# Patient Record
Sex: MALE | Race: WHITE | HISPANIC OR LATINO | Employment: OTHER | URBAN - METROPOLITAN AREA
[De-identification: names, ages, dates, MRNs, and addresses within clinical notes are randomized per-mention and may not be internally consistent; named-entity substitution may affect disease eponyms.]

---

## 2017-01-03 ENCOUNTER — HOSPITAL ENCOUNTER (INPATIENT)
Facility: HOSPITAL | Age: 67
LOS: 6 days | Discharge: HOME | DRG: 291 | End: 2017-01-09
Attending: EMERGENCY MEDICINE | Admitting: INTERNAL MEDICINE
Payer: MEDICARE

## 2017-01-03 ENCOUNTER — ANTI-COAG VISIT (OUTPATIENT)
Dept: CARDIOLOGY | Facility: CLINIC | Age: 67
End: 2017-01-03

## 2017-01-03 DIAGNOSIS — E11.65 TYPE 2 DIABETES MELLITUS WITH HYPERGLYCEMIA, WITH LONG-TERM CURRENT USE OF INSULIN: ICD-10-CM

## 2017-01-03 DIAGNOSIS — E11.22 CKD STAGE 3 DUE TO TYPE 2 DIABETES MELLITUS: ICD-10-CM

## 2017-01-03 DIAGNOSIS — N17.9 AKI (ACUTE KIDNEY INJURY): ICD-10-CM

## 2017-01-03 DIAGNOSIS — Z79.01 LONG TERM (CURRENT) USE OF ANTICOAGULANTS: ICD-10-CM

## 2017-01-03 DIAGNOSIS — D50.9 IRON DEFICIENCY ANEMIA, UNSPECIFIED IRON DEFICIENCY ANEMIA TYPE: ICD-10-CM

## 2017-01-03 DIAGNOSIS — I10 ESSENTIAL HYPERTENSION: ICD-10-CM

## 2017-01-03 DIAGNOSIS — N19 RENAL FAILURE: ICD-10-CM

## 2017-01-03 DIAGNOSIS — I50.23 ACUTE ON CHRONIC SYSTOLIC HEART FAILURE: ICD-10-CM

## 2017-01-03 DIAGNOSIS — I63.9 LEFT-SIDED CEREBROVASCULAR ACCIDENT (CVA): ICD-10-CM

## 2017-01-03 DIAGNOSIS — E87.70 HYPERVOLEMIA, UNSPECIFIED HYPERVOLEMIA TYPE: ICD-10-CM

## 2017-01-03 DIAGNOSIS — I50.9 CONGESTIVE HEART FAILURE, UNSPECIFIED CONGESTIVE HEART FAILURE CHRONICITY, UNSPECIFIED CONGESTIVE HEART FAILURE TYPE: Primary | ICD-10-CM

## 2017-01-03 DIAGNOSIS — Z79.4 TYPE 2 DIABETES MELLITUS WITH HYPERGLYCEMIA, WITH LONG-TERM CURRENT USE OF INSULIN: ICD-10-CM

## 2017-01-03 DIAGNOSIS — I82.90 THROMBUS: ICD-10-CM

## 2017-01-03 DIAGNOSIS — R07.9 CHEST PAIN: ICD-10-CM

## 2017-01-03 DIAGNOSIS — N18.30 CKD STAGE 3 DUE TO TYPE 2 DIABETES MELLITUS: ICD-10-CM

## 2017-01-03 DIAGNOSIS — E87.5 HYPERKALEMIA: ICD-10-CM

## 2017-01-03 LAB
ALBUMIN SERPL BCP-MCNC: 2.7 G/DL
ALP SERPL-CCNC: 124 U/L
ALT SERPL W/O P-5'-P-CCNC: 25 U/L
ANION GAP SERPL CALC-SCNC: 10 MMOL/L
ANION GAP SERPL CALC-SCNC: 5 MMOL/L
AST SERPL-CCNC: 16 U/L
BACTERIA #/AREA URNS HPF: NORMAL /HPF
BASOPHILS # BLD AUTO: 0.03 K/UL
BASOPHILS NFR BLD: 0.5 %
BILIRUB SERPL-MCNC: 0.4 MG/DL
BILIRUB UR QL STRIP: NEGATIVE
BNP SERPL-MCNC: 1670 PG/ML
BUN SERPL-MCNC: 77 MG/DL
BUN SERPL-MCNC: 80 MG/DL
CALCIUM SERPL-MCNC: 7.8 MG/DL
CALCIUM SERPL-MCNC: 8.6 MG/DL
CHLORIDE SERPL-SCNC: 106 MMOL/L
CHLORIDE SERPL-SCNC: 107 MMOL/L
CLARITY UR: CLEAR
CO2 SERPL-SCNC: 21 MMOL/L
CO2 SERPL-SCNC: 25 MMOL/L
COLOR UR: YELLOW
CREAT SERPL-MCNC: 4 MG/DL
CREAT SERPL-MCNC: 4.1 MG/DL
CREAT UR-MCNC: 61.5 MG/DL
DIFFERENTIAL METHOD: ABNORMAL
EOSINOPHIL # BLD AUTO: 0.2 K/UL
EOSINOPHIL NFR BLD: 2.9 %
ERYTHROCYTE [DISTWIDTH] IN BLOOD BY AUTOMATED COUNT: 16.3 %
EST. GFR  (AFRICAN AMERICAN): 17 ML/MIN/1.73 M^2
EST. GFR  (AFRICAN AMERICAN): 17 ML/MIN/1.73 M^2
EST. GFR  (NON AFRICAN AMERICAN): 14 ML/MIN/1.73 M^2
EST. GFR  (NON AFRICAN AMERICAN): 15 ML/MIN/1.73 M^2
GLUCOSE SERPL-MCNC: 127 MG/DL
GLUCOSE SERPL-MCNC: 282 MG/DL
GLUCOSE UR QL STRIP: ABNORMAL
HCT VFR BLD AUTO: 26.3 %
HGB BLD-MCNC: 8.2 G/DL
HGB UR QL STRIP: ABNORMAL
HYALINE CASTS #/AREA URNS LPF: 0 /LPF
INR PPP: 1.1
INR PPP: 1.2
INR PPP: 1.3
IRON SERPL-MCNC: 39 UG/DL
KETONES UR QL STRIP: NEGATIVE
LEUKOCYTE ESTERASE UR QL STRIP: NEGATIVE
LYMPHOCYTES # BLD AUTO: 0.5 K/UL
LYMPHOCYTES NFR BLD: 8.1 %
MCH RBC QN AUTO: 29.3 PG
MCHC RBC AUTO-ENTMCNC: 31.2 %
MCV RBC AUTO: 94 FL
MICROSCOPIC COMMENT: NORMAL
MONOCYTES # BLD AUTO: 0.4 K/UL
MONOCYTES NFR BLD: 7.3 %
NEUTROPHILS # BLD AUTO: 4.7 K/UL
NEUTROPHILS NFR BLD: 81 %
NITRITE UR QL STRIP: NEGATIVE
PH UR STRIP: 6 [PH] (ref 5–8)
PHOSPHATE SERPL-MCNC: 5.1 MG/DL
PLATELET # BLD AUTO: 273 K/UL
PMV BLD AUTO: 8.7 FL
POCT GLUCOSE: 307 MG/DL (ref 70–110)
POTASSIUM SERPL-SCNC: 5.7 MMOL/L
POTASSIUM SERPL-SCNC: 6 MMOL/L
PROT SERPL-MCNC: 6.2 G/DL
PROT UR QL STRIP: ABNORMAL
PROT UR-MCNC: 168 MG/DL
PROT UR-MCNC: 169 MG/DL
PROT/CREAT RATIO, UR: 2.75
PROTHROMBIN TIME: 12.1 SEC
PROTHROMBIN TIME: 12.4 SEC
RBC # BLD AUTO: 2.8 M/UL
RBC #/AREA URNS HPF: 0 /HPF (ref 0–4)
SATURATED IRON: 14 %
SODIUM SERPL-SCNC: 136 MMOL/L
SODIUM SERPL-SCNC: 138 MMOL/L
SODIUM UR-SCNC: 76 MMOL/L
SP GR UR STRIP: 1.02 (ref 1–1.03)
TOTAL IRON BINDING CAPACITY: 275 UG/DL
TRANSFERRIN SERPL-MCNC: 186 MG/DL
TROPONIN I SERPL DL<=0.01 NG/ML-MCNC: 0.01 NG/ML
URN SPEC COLLECT METH UR: ABNORMAL
UROBILINOGEN UR STRIP-ACNC: NEGATIVE EU/DL
WBC # BLD AUTO: 5.78 K/UL
WBC #/AREA URNS HPF: 2 /HPF (ref 0–5)

## 2017-01-03 PROCEDURE — 82962 GLUCOSE BLOOD TEST: CPT

## 2017-01-03 PROCEDURE — 25000003 PHARM REV CODE 250: Performed by: INTERNAL MEDICINE

## 2017-01-03 PROCEDURE — 36415 COLL VENOUS BLD VENIPUNCTURE: CPT

## 2017-01-03 PROCEDURE — 96365 THER/PROPH/DIAG IV INF INIT: CPT

## 2017-01-03 PROCEDURE — 85025 COMPLETE CBC W/AUTO DIFF WBC: CPT

## 2017-01-03 PROCEDURE — 93005 ELECTROCARDIOGRAM TRACING: CPT

## 2017-01-03 PROCEDURE — 94645 CONT INHLJ TX EACH ADDL HOUR: CPT

## 2017-01-03 PROCEDURE — 96375 TX/PRO/DX INJ NEW DRUG ADDON: CPT

## 2017-01-03 PROCEDURE — 63600175 PHARM REV CODE 636 W HCPCS: Performed by: INTERNAL MEDICINE

## 2017-01-03 PROCEDURE — 86335 IMMUNFIX E-PHORSIS/URINE/CSF: CPT

## 2017-01-03 PROCEDURE — 25000242 PHARM REV CODE 250 ALT 637 W/ HCPCS: Performed by: EMERGENCY MEDICINE

## 2017-01-03 PROCEDURE — 25000003 PHARM REV CODE 250: Performed by: EMERGENCY MEDICINE

## 2017-01-03 PROCEDURE — 84166 PROTEIN E-PHORESIS/URINE/CSF: CPT

## 2017-01-03 PROCEDURE — 85610 PROTHROMBIN TIME: CPT | Mod: 91

## 2017-01-03 PROCEDURE — 84300 ASSAY OF URINE SODIUM: CPT

## 2017-01-03 PROCEDURE — 94640 AIRWAY INHALATION TREATMENT: CPT

## 2017-01-03 PROCEDURE — 84484 ASSAY OF TROPONIN QUANT: CPT

## 2017-01-03 PROCEDURE — 99285 EMERGENCY DEPT VISIT HI MDM: CPT | Mod: 25

## 2017-01-03 PROCEDURE — 84100 ASSAY OF PHOSPHORUS: CPT

## 2017-01-03 PROCEDURE — 81000 URINALYSIS NONAUTO W/SCOPE: CPT

## 2017-01-03 PROCEDURE — 25000242 PHARM REV CODE 250 ALT 637 W/ HCPCS: Performed by: INTERNAL MEDICINE

## 2017-01-03 PROCEDURE — 84484 ASSAY OF TROPONIN QUANT: CPT | Mod: 91

## 2017-01-03 PROCEDURE — 84156 ASSAY OF PROTEIN URINE: CPT

## 2017-01-03 PROCEDURE — 83540 ASSAY OF IRON: CPT

## 2017-01-03 PROCEDURE — 11000001 HC ACUTE MED/SURG PRIVATE ROOM

## 2017-01-03 PROCEDURE — 83880 ASSAY OF NATRIURETIC PEPTIDE: CPT

## 2017-01-03 PROCEDURE — 80048 BASIC METABOLIC PNL TOTAL CA: CPT

## 2017-01-03 PROCEDURE — 63600175 PHARM REV CODE 636 W HCPCS: Performed by: EMERGENCY MEDICINE

## 2017-01-03 PROCEDURE — 80048 BASIC METABOLIC PNL TOTAL CA: CPT | Mod: 91

## 2017-01-03 PROCEDURE — 86335 IMMUNFIX E-PHORSIS/URINE/CSF: CPT | Mod: 26,,, | Performed by: PATHOLOGY

## 2017-01-03 PROCEDURE — 85610 PROTHROMBIN TIME: CPT

## 2017-01-03 PROCEDURE — 96367 TX/PROPH/DG ADDL SEQ IV INF: CPT

## 2017-01-03 PROCEDURE — 84156 ASSAY OF PROTEIN URINE: CPT | Mod: 91

## 2017-01-03 PROCEDURE — 80053 COMPREHEN METABOLIC PANEL: CPT

## 2017-01-03 PROCEDURE — 84166 PROTEIN E-PHORESIS/URINE/CSF: CPT | Mod: 26,,, | Performed by: PATHOLOGY

## 2017-01-03 RX ORDER — ALBUTEROL SULFATE 2.5 MG/.5ML
20 SOLUTION RESPIRATORY (INHALATION)
Status: COMPLETED | OUTPATIENT
Start: 2017-01-03 | End: 2017-01-03

## 2017-01-03 RX ORDER — CLONIDINE 0.1 MG/24H
1 PATCH, EXTENDED RELEASE TRANSDERMAL
Status: DISCONTINUED | OUTPATIENT
Start: 2017-01-04 | End: 2017-01-09

## 2017-01-03 RX ORDER — METOLAZONE 5 MG/1
10 TABLET ORAL ONCE
Status: COMPLETED | OUTPATIENT
Start: 2017-01-03 | End: 2017-01-03

## 2017-01-03 RX ORDER — AMLODIPINE BESYLATE 5 MG/1
10 TABLET ORAL NIGHTLY
Status: DISCONTINUED | OUTPATIENT
Start: 2017-01-03 | End: 2017-01-09 | Stop reason: HOSPADM

## 2017-01-03 RX ORDER — TRAZODONE HYDROCHLORIDE 50 MG/1
50 TABLET ORAL NIGHTLY
Status: DISCONTINUED | OUTPATIENT
Start: 2017-01-03 | End: 2017-01-09 | Stop reason: HOSPADM

## 2017-01-03 RX ORDER — ALBUTEROL SULFATE 2.5 MG/.5ML
5 SOLUTION RESPIRATORY (INHALATION)
Status: DISCONTINUED | OUTPATIENT
Start: 2017-01-03 | End: 2017-01-03

## 2017-01-03 RX ORDER — HYDRALAZINE HYDROCHLORIDE 25 MG/1
50 TABLET, FILM COATED ORAL 3 TIMES DAILY
Status: DISCONTINUED | OUTPATIENT
Start: 2017-01-03 | End: 2017-01-09 | Stop reason: HOSPADM

## 2017-01-03 RX ORDER — ALBUTEROL SULFATE 2.5 MG/.5ML
15 SOLUTION RESPIRATORY (INHALATION) ONCE
Status: COMPLETED | OUTPATIENT
Start: 2017-01-03 | End: 2017-01-03

## 2017-01-03 RX ORDER — BUMETANIDE 0.25 MG/ML
2 INJECTION INTRAMUSCULAR; INTRAVENOUS ONCE
Status: COMPLETED | OUTPATIENT
Start: 2017-01-03 | End: 2017-01-03

## 2017-01-03 RX ORDER — ALBUTEROL SULFATE 2.5 MG/.5ML
5 SOLUTION RESPIRATORY (INHALATION)
Status: DISCONTINUED | OUTPATIENT
Start: 2017-01-03 | End: 2017-01-04

## 2017-01-03 RX ORDER — FERROUS SULFATE 325(65) MG
325 TABLET, DELAYED RELEASE (ENTERIC COATED) ORAL DAILY
Status: DISCONTINUED | OUTPATIENT
Start: 2017-01-04 | End: 2017-01-09 | Stop reason: HOSPADM

## 2017-01-03 RX ORDER — ROSUVASTATIN CALCIUM 10 MG/1
40 TABLET, COATED ORAL DAILY
Status: DISCONTINUED | OUTPATIENT
Start: 2017-01-04 | End: 2017-01-09 | Stop reason: HOSPADM

## 2017-01-03 RX ORDER — DEXTROSE 50 % IN WATER (D50W) INTRAVENOUS SYRINGE
25
Status: COMPLETED | OUTPATIENT
Start: 2017-01-03 | End: 2017-01-03

## 2017-01-03 RX ORDER — CITALOPRAM 20 MG/1
20 TABLET, FILM COATED ORAL DAILY
Status: DISCONTINUED | OUTPATIENT
Start: 2017-01-04 | End: 2017-01-09 | Stop reason: HOSPADM

## 2017-01-03 RX ORDER — FUROSEMIDE 10 MG/ML
40 INJECTION INTRAMUSCULAR; INTRAVENOUS
Status: COMPLETED | OUTPATIENT
Start: 2017-01-03 | End: 2017-01-03

## 2017-01-03 RX ORDER — WARFARIN 4 MG/1
4 TABLET ORAL DAILY
Status: CANCELLED | OUTPATIENT
Start: 2017-01-04

## 2017-01-03 RX ORDER — SODIUM CHLORIDE 0.9 % (FLUSH) 0.9 %
3 SYRINGE (ML) INJECTION EVERY 8 HOURS
Status: DISCONTINUED | OUTPATIENT
Start: 2017-01-03 | End: 2017-01-09 | Stop reason: HOSPADM

## 2017-01-03 RX ADMIN — SODIUM POLYSTYRENE SULFONATE 15 G: 15 SUSPENSION ORAL; RECTAL at 06:01

## 2017-01-03 RX ADMIN — TRAZODONE HYDROCHLORIDE 50 MG: 50 TABLET ORAL at 11:01

## 2017-01-03 RX ADMIN — BUMETANIDE 1 MG/HR: 0.25 INJECTION INTRAMUSCULAR; INTRAVENOUS at 08:01

## 2017-01-03 RX ADMIN — ALBUTEROL SULFATE 2.5 MG: 2.5 SOLUTION RESPIRATORY (INHALATION) at 07:01

## 2017-01-03 RX ADMIN — AMLODIPINE BESYLATE 10 MG: 5 TABLET ORAL at 11:01

## 2017-01-03 RX ADMIN — FUROSEMIDE 40 MG: 10 INJECTION, SOLUTION INTRAMUSCULAR; INTRAVENOUS at 04:01

## 2017-01-03 RX ADMIN — INSULIN DETEMIR 12 UNITS: 100 INJECTION, SOLUTION SUBCUTANEOUS at 11:01

## 2017-01-03 RX ADMIN — ALBUTEROL SULFATE 20 MG: 2.5 SOLUTION RESPIRATORY (INHALATION) at 06:01

## 2017-01-03 RX ADMIN — INSULIN HUMAN 10 UNITS: 100 INJECTION, SOLUTION PARENTERAL at 06:01

## 2017-01-03 RX ADMIN — DEXTROSE MONOHYDRATE 25 G: 500 INJECTION PARENTERAL at 06:01

## 2017-01-03 RX ADMIN — CALCIUM GLUCONATE 1000 MG: 94 INJECTION, SOLUTION INTRAVENOUS at 11:01

## 2017-01-03 RX ADMIN — SODIUM CHLORIDE, PRESERVATIVE FREE 3 ML: 5 INJECTION INTRAVENOUS at 11:01

## 2017-01-03 RX ADMIN — METOLAZONE 10 MG: 5 TABLET ORAL at 08:01

## 2017-01-03 RX ADMIN — BUMETANIDE 2 MG: 0.25 INJECTION INTRAMUSCULAR; INTRAVENOUS at 08:01

## 2017-01-03 RX ADMIN — HYDRALAZINE HYDROCHLORIDE 50 MG: 25 TABLET ORAL at 11:01

## 2017-01-03 RX ADMIN — CALCIUM GLUCONATE 1 G: 94 INJECTION, SOLUTION INTRAVENOUS at 06:01

## 2017-01-03 NOTE — IP AVS SNAPSHOT
[facility]  [address]  [place] [address]  Phone: [phone]           I have received a copy of my After Visit Summary and discharge instructions from [facility].    INSTRUCTIONS RECEIVED AND UNDERSTOOD BY:                     Patient/Patient Representative: ________________________________________________________________     Date/Time: ________________________________________________________________                     Instructions Given By: ________________________________________________________________     Date/Time: ________________________________________________________________

## 2017-01-03 NOTE — LETTER
January 7, 2017         [address] [place] [address] [place] [address]  Phone: [phone]  Fax: [phone]       Patient: Ambrocio Chin  YOB: 1950  Date of Visit: 01/06/2017    To Whom It May Concern:    Margie Chin [birth year] was at [facility] on 01/06/2017. He may return to work/school on 01/10/2017 with no restrictions. If you have any questions or concerns, or if I can be of further assistance, please do not hesitate to contact me.    Sincerely,        Justyn Albarran MD

## 2017-01-03 NOTE — IP AVS SNAPSHOT
[facility]  [address]  [place] [address]  Phone: [phone]           Patient Discharge Instructions     Our goal is to set you up for success. This packet includes information on your condition, medications, and your home care. It will help you to care for yourself so you don't get sicker and need to go back to the hospital.     Please ask your nurse if you have any questions.        There are many details to remember when preparing to leave the hospital. Here is what you will need to do:    1. Take your medicine. If you are prescribed medications, review your Medication List in the following pages. You may have new medications to  at the pharmacy and others that you'll need to stop taking. Review the instructions for how and when to take your medications. Talk with your doctor or nurses if you are unsure of what to do.     2. Go to your follow-up appointments. Specific follow-up information is listed in the following pages. Your may be contacted by a transition nurse or clinical provider about future appointments. Be sure we have all of the phone numbers to reach you, if needed. Please contact your provider's office if you are unable to make an appointment.     3. Watch for warning signs. Your doctor or nurse will give you detailed warning signs to watch for and when to call for assistance. These instructions may also include educational information about your condition. If you experience any of warning signs to your health, call your doctor.               ** Verify the list of medication(s) below is accurate and up to date. Carry this with you in case of emergency. If your medications have changed, please notify your healthcare provider.             Medication List      START taking these medications        Additional Info                      aspirin 81 MG EC tablet   Commonly known as:  ECOTRIN   Refills:  0   Dose:  81 mg    Last time this was given:  81 mg on 1/9/2017  9:05  AM   Instructions:  Take 1 tablet (81 mg total) by mouth once daily.     Begin Date    AM    Noon    PM    Bedtime       ergocalciferol 50,000 unit Cap   Commonly known as:  ERGOCALCIFEROL   Quantity:  8 capsule   Refills:  0   Dose:  72463 Units    Last time this was given:  50,000 Units on 1/8/2017  9:16 AM   Instructions:  Take 1 capsule (50,000 Units total) by mouth every 7 days.     Begin Date    AM    Noon    PM    Bedtime       lancets Misc   Commonly known as:  ACCU-CHEK SOFTCLIX LANCETS   Quantity:  120 each   Refills:  2   Dose:  1 lancet    Instructions:  1 lancet by Misc.(Non-Drug; Combo Route) route 2 hours after meals and at bedtime.     Begin Date    AM    Noon    PM    Bedtime       metOLazone 10 MG tablet   Commonly known as:  ZAROXOLYN   Quantity:  30 tablet   Refills:  11   Dose:  10 mg    Last time this was given:  10 mg on 1/9/2017  9:05 AM   Instructions:  Take 1 tablet (10 mg total) by mouth once daily.     Begin Date    AM    Noon    PM    Bedtime       nut.tx.impaired renal fxn,soy 0.04-1.79 gram-kcal/mL Liqd   Quantity:  30 Can   Refills:  3    Instructions:  1 Can with Meals Three Times a Day     Begin Date    AM    Noon    PM    Bedtime       vitamin renal formula (B-complex-vitamin c-folic acid) 1 mg Cap   Commonly known as:  NEPHROCAP   Quantity:  90 capsule   Refills:  3   Dose:  1 capsule    Last time this was given:  1 capsule on 1/9/2017  9:05 AM   Instructions:  Take 1 capsule by mouth once daily.     Begin Date    AM    Noon    PM    Bedtime         CHANGE how you take these medications        Additional Info                      carvedilol 12.5 MG tablet   Commonly known as:  COREG   Quantity:  180 tablet   Refills:  3   Dose:  12.5 mg   What changed:  when to take this    Last time this was given:  6.25 mg on 1/9/2017  9:05 AM   Instructions:  Take 1 tablet (12.5 mg total) by mouth 2 (two) times daily.     Begin Date    AM    Noon    PM    Bedtime       furosemide 80 MG tablet    Commonly known as:  LASIX   Quantity:  90 tablet   Refills:  11   Dose:  80 mg   What changed:    - medication strength  - how much to take  - when to take this  - Another medication with the same name was removed. Continue taking this medication, and follow the directions you see here.    Last time this was given:  80 mg on 1/9/2017  5:50 AM   Instructions:  Take 1 tablet (80 mg total) by mouth every 8 (eight) hours.     Begin Date    AM    Noon    PM    Bedtime         CONTINUE taking these medications        Additional Info                      amlodipine 10 MG tablet   Commonly known as:  NORVASC   Quantity:  90 tablet   Refills:  3   Dose:  10 mg    Last time this was given:  10 mg on 1/8/2017 10:18 PM   Instructions:  Take 1 tablet (10 mg total) by mouth every evening.     Begin Date    AM    Noon    PM    Bedtime       citalopram 20 MG tablet   Commonly known as:  CELEXA   Quantity:  30 tablet   Refills:  3   Dose:  20 mg    Last time this was given:  20 mg on 1/9/2017  9:05 AM   Instructions:  Take 1 tablet (20 mg total) by mouth once daily.     Begin Date    AM    Noon    PM    Bedtime       ferrous sulfate 325 (65 FE) MG EC tablet   Refills:  0   Dose:  325 mg    Last time this was given:  325 mg on 1/9/2017  9:05 AM   Instructions:  Take 1 tablet (325 mg total) by mouth once daily.     Begin Date    AM    Noon    PM    Bedtime       hydrALAZINE 50 MG tablet   Commonly known as:  APRESOLINE   Quantity:  90 tablet   Refills:  3   Dose:  50 mg    Last time this was given:  50 mg on 1/8/2017 10:18 PM   Instructions:  Take 1 tablet (50 mg total) by mouth 3 (three) times daily.     Begin Date    AM    Noon    PM    Bedtime       insulin glargine 100 unit/mL injection   Commonly known as:  LANTUS   Quantity:  10.8 mL   Refills:  0   Dose:  12 Units    Instructions:  Inject 12 Units into the skin every evening.     Begin Date    AM    Noon    PM    Bedtime       lisinopril 40 MG tablet   Commonly known as:   PRINIVIL,ZESTRIL   Quantity:  90 tablet   Refills:  0   Dose:  40 mg    Last time this was given:  10 mg on 1/9/2017  9:31 AM   Instructions:  Take 1 tablet (40 mg total) by mouth once daily.     Begin Date    AM    Noon    PM    Bedtime       rosuvastatin 20 MG tablet   Commonly known as:  CRESTOR   Quantity:  30 tablet   Refills:  3   Dose:  40 mg    Last time this was given:  40 mg on 1/9/2017 10:40 AM   Instructions:  Take 2 tablets (40 mg total) by mouth once daily.     Begin Date    AM    Noon    PM    Bedtime       trazodone 50 MG tablet   Commonly known as:  DESYREL   Quantity:  30 tablet   Refills:  3   Dose:  50 mg    Last time this was given:  50 mg on 1/8/2017 10:18 PM   Instructions:  Take 1 tablet (50 mg total) by mouth every evening.     Begin Date    AM    Noon    PM    Bedtime         STOP taking these medications     cloNIDine 0.1 mg/24 hr td ptwk 0.1 mg/24 hr   Commonly known as:  CATAPRES       warfarin 4 MG tablet   Commonly known as:  COUMADIN            Where to Get Your Medications      You can get these medications from any pharmacy     Bring a paper prescription for each of these medications     amlodipine 10 MG tablet    carvedilol 12.5 MG tablet    ergocalciferol 50,000 unit Cap    furosemide 80 MG tablet    hydrALAZINE 50 MG tablet    insulin glargine 100 unit/mL injection    lancets Misc    lisinopril 40 MG tablet    metOLazone 10 MG tablet    nut.tx.impaired renal fxn,soy 0.04-1.79 gram-kcal/mL Liqd    vitamin renal formula (B-complex-vitamin c-folic acid) 1 mg Cap       You don't need a prescription for these medications     aspirin 81 MG EC tablet                  Please bring to all follow up appointments:    1. A copy of your discharge instructions.  2. All medicines you are currently taking in their original bottles.  3. Identification and insurance card.    Please arrive 15 minutes ahead of scheduled appointment time.    Please call 24 hours in advance if you must reschedule your  appointment and/or time.        Your Scheduled Appointments     Jan 17, 2017 11:20 AM CST   New Patient with MD [place] Allen - Neurology ([place])    [address]  [place] [id number] [phone]            Jan 23, 2017  8:00 AM CST   [facility]   Leonid Perdomo - Arrhythmia ([address] )    [address]  [facility] [address]   [phone]            Jan 27, 2017  8:20 AM CST   Established Patient Visit with MD [place] Grover - Cardiology ([place])    [address], [address]  [place] [id number] [phone]              Follow-up Information     Follow up with KRISTOPHER On 1/13/2017.    Why:  time: 10: 30    Contact information:    [address] [place] [address]  [phone]          Follow up with Luis Alberto Schmitz MD. Go on 1/12/2017.    Specialty:  Nephrology    Why:  Time: 11am    Contact information:    [address]  [address]  [place] [address]  [phone]          Schedule an appointment as soon as possible for a visit with Giovani Elkins MD.    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    Specialties:  INTERVENTIONAL CARDIOLOGY, Cardiology    Why:  at 8:20am    Contact information:    [address]  [address]  [place] [address]  [phone]          Follow up with KRISTOPHER.    Why:  Tuesday at 1100 Jan 17    Contact information:    [address]  [place] [address]  [phone]          Follow up with Luis Alberto Schmitz MD.    Specialty:  Nephrology    Why:  Jan 12th at 11     Contact information:    [address]  [address]  [place] [address]  [phone]          Follow up with Giovani Elkins MD.    Specialty:  Vascular Surgery    Why:  JAN 31ST AT 10AM    Contact information:    [address]  [address]  [facility] [address]  [phone]        Referrals     Future Orders    Ambulatory Referral to Cardiology         Discharge Instructions     Future Orders    CBC W/ AUTO DIFFERENTIAL     Process Instructions:    Please collect a Lavender, EDTA tube or EDTA Microtainer.  If the patient is a known platelet clumper, please collect an additional citrate (blue top) tube.    COMPREHENSIVE METABOLIC PANEL     MAGNESIUM     PHOSPHORUS     Activity as tolerated     BATH/SHOWER CHAIR FOR HOME USE     Questions:    Height:  5' 3" (1.6 m)    Weight:  86.5 kg (190 lb 11.2 oz)    Does patient have medical equipment at home?:  walker, rolling    bedside commode    Length of need (1-99 months):  99    Type:  Without back    Call MD for:  difficulty breathing or increased cough     Call MD for:  increased confusion or weakness     Call MD for:  persistent dizziness, light-headedness, or visual disturbances     Call MD for:  persistent nausea and vomiting or diarrhea     Call MD for:  redness, tenderness, or signs of infection (pain, swelling, redness, odor or green/yellow discharge around incision site)     Call MD for:  severe persistent headache     Call MD for:  severe uncontrolled pain     Call MD for:  temperature >100.4     Call MD for:  worsening rash     Diet renal     TRANSFER TUB BENCH FOR HOME USE     Questions:    Type of Transfer Tub Bench:  Unpadded    Height:  5' 3" (1.6 m)    Weight:  92.5 kg (203 lb 14.8 oz)    Does patient have medical equipment at home?:  walker, rolling    bedside commode    Length of need (1-99 months):  99      Discharge References/Attachments     HEART MEDICATIONS, TAKING (ENGLISH)    HEART FAILURE: TRACKING YOUR WEIGHT (ENGLISH)    HEART FAILURE: WARNING SIGNS OF A FLARE-UP (ENGLISH)    HEART FAILURE, COPING WITH (ENGLISH)    HEART FAILURE, DISCHARGE INSTRUCTIONS FOR (ENGLISH)    HEART FAILURE, DISCHARGE INSTRUCTIONS FOR (Bulgarian)    HEART FAILURE, WHAT IS (Bulgarian)    " "HEART FAILURE: WARNING SIGNS OF A FLARE-UP (Jamaican)    HEART FAILURE: TRACKING YOUR WEIGHT (Jamaican)    HEART FAILURE: PROCEDURES THAT MAY HELP (Jamaican)    HEART FAILURE: MAKING CHANGES TO YOUR DIET (Jamaican)    HEMODIALYSIS (Jamaican)    KIDNEY DISEASE, DISCHARGE INSTRUCTIONS FOR CHRONIC  (Jamaican)    ASPIRIN ORAL TABLET (ENGLISH)    ERGOCALCIFEROL ORAL CAPSULE (ENGLISH)    METOLAZONE (ENGLISH)    NEPHROCAPS (ENGLISH)    ASPIRINA, TABLETA ORAL (Jamaican)    ERGOCALCIFEROL, CÁPSULA ORAL  (Jamaican)    METOLAZONE (Jamaican)    NEPHROCAPS (Jamaican)    CHRONIC KIDNEY DISEASE, DIET FOR (Jamaican)    DIABETES, DIET (Jamaican)        Primary Diagnosis     Your primary diagnosis was:  Heart Failure      Admission Information     Date & Time Provider Department CSN    1/3/2017  4:01 PM Jayy Gaytan MD [facility] [id number]      Care Providers     Provider Role Specialty Primary office phone    Jayy Gaytan MD Attending Provider Internal Medicine [phone]      Important Medicare Message          Most Recent Value    Important Message from Medicare Regarding Discharge Appeal Rights  Given to patient/caregiver, Explained to patient/caregiver, Signed/date by patient/caregiver yes 01/06/2017 1132      Your Vitals Were     BP Pulse Temp Resp Height Weight    121/61 (BP Location: Left arm, Patient Position: Lying, BP Method: Automatic) 68 98 °F (36.7 °C) (Oral) 20 5' 3" (1.6 m) 86.5 kg (190 lb 11.2 oz)    SpO2 BMI             93% 33.78 kg/m2         Recent Lab Values        11/15/2016                           1:37 PM           A1C 10.5 (H)           Comment for A1C at  1:37 PM on 11/15/2016:  According to ADA guidelines, hemoglobin A1C <7.0% represents  optimal control in non-pregnant diabetic patients.  Different  metrics may apply to specific populations.   Standards of Medical Care in Diabetes - 2016.  For the purpose of screening for the presence of diabetes:  <5.7%     Consistent with the absence of " diabetes  5.7-6.4%  Consistent with increasing risk for diabetes   (prediabetes)  >or=6.5%  Consistent with diabetes  Currently no consensus exists for use of hemoglobin A1C  for diagnosis of diabetes for children.        Pending Labs     Order Current Status    Immunofixation, 24 hour Urine In process      Allergies as of 1/9/2017     No Known Allergies      [facility]     [facility] Nurse Care Line - 24/7 Assistance  Unless otherwise directed by your provider, please contact [facility], our nurse care line that is available for 24/7 assistance.     Registered nurses in the [facility] provide clinical advisement, health education, appointment booking, and other advisory services.  Call for this free service at [phone].        Advance Directives     An advance directive is a document which, in the event you are no longer able to make decisions for yourself, tells your healthcare team what kind of treatment you do or do not want to receive, or who you would like to make those decisions for you.  If you do not currently have an advance directive, [facility] encourages you to create one.  For more information call:  [phone]), [phone] ([phone]),  or log on to [email or web address].        Language Assistance Services     ATTENTION: Language assistance services are available, free of charge. Please call [phone].      ATENCIÓN: Si habla español, tiene a paulson disposición servicios gratuitos de asistencia lingüística. Llame al [phone].     CHÚ Ý: N?u b?n nói Ti?ng Vi?t, có các d?ch v? h? tr? ngôn ng? mi?n phí dành cho b?n. G?i s? [phone].        Stroke Education              Heart Failure Education       Heart Failure: Being Active  You have a condition called heart failure. Being active doesnt mean that you have to wear yourself out. Even a little movement each day helps to strengthen your heart. If you cant get out to exercise, you can do  simple stretching and strengthening exercises at home. These are good ways to keep you well-conditioned and prevent you and your heart from becoming excessively weak.    Ideas to get you started  · Add a little movement to things you do now. Walk to mail letters. Park your car at the far end of the parking lot and walk to the store. Walk up a flight of stairs instead of taking the elevator.  · Choose activities you enjoy. You might walk, swim, or ride an exercise bike. Things like gardening and washing the car count, too. Other possibilities include: washing dishes, walking the dog, walking around the mall, and doing aerobic activities with friends.  · Join a group exercise program at a [facility] or [facility], a senior center, or a community center. Or look into a hospital cardiac rehabilitation program. Ask your doctor if you qualify.  Tips to keep you going  · Get up and get dressed each day. Go to a coffee shop and read a newspaper or go somewhere that you'll be in the presence of other active people. Youll feel more like being active.  · Make a plan. Choose one or more activities that you enjoy and that you can easily do. Then plan to do at least one each day. You might write your plan on a calendar.  · Go with a friend or a group if you like company. This can help you feel supported and stay motivated, too.  · Plan social events that you enjoy. This will keep you mentally engaged as well as physically motivated to do things you find pleasure in.  For your safety  · Talk with your healthcare provider before starting an exercise program.  · Exercise indoors when its too hot or too cold outside, or when the air quality is poor. Try walking at a shopping mall.  · Wear socks and sturdy shoes to maintain your balance and prevent falls.  · Start slowly. Do a few minutes several times a day at first. Increase your time and speed little by little.  · Stop and rest whenever you feel tired or get short of breath.  · Dont push  yourself on days when you dont feel well.  © 4132-0171 [employer]. [address] [place] [address], [place] [address]. All rights reserved. This information is not intended as a substitute for professional medical care. Always follow your healthcare professional's instructions.              Heart Failure: Evaluating Your Heart  You have a condition called heart failure. To evaluate your condition, your doctor will examine you, ask questions, and do some tests. Along with looking for signs of heart failure, the doctor looks for any other health problems that may have led to heart failure. The results of your evaluation will help your doctor form a treatment plan.  Health history and physical exam  Your visit will start with a health history. Tell the doctor about any symptoms youve noticed and about all medicines you take. Then youll have a physical exam. This includes listening to your heartbeat and breathing. Youll also be checked for swelling (edema) in your legs and neck. When you have fluid buildup or fluid in the lungs, it may be called congestive heart failure.  Diagnosing heart failure     During an echocardiogram, sound waves bounce off the heart. These are converted into a picture on the screen.   The following may be done to help your doctor form a diagnosis:  · X-rays show the size and shape of your heart. These pictures can also show fluid in your lungs.  · An electrocardiogram (ECG or EKG) shows the pattern of your heartbeat. Small pads (electrodes) are placed on your chest, arms, and legs. Wires connect the pads to the ECG machine, which records your hearts electrical signals. This can give the doctor information about heart function.  · An echocardiogram uses ultrasound waves to show the structure and movement of your heart muscle. This shows how well the heart pumps. It also shows the thickness of the heart walls, and if the heart is enlarged. It is one of the most useful, non-invasive  tests as it provides information about the heart's general function. This helps your doctor make treatment decisions.  · Lab tests evaluate small amounts of blood or urine for signs of problems. A BNP lab test can help diagnose and evaluate heart failure. BNP stands for B-type natriuretic peptide. The ventricles secrete more BNP when heart failure worsens. Lab tests can also provide information about metabolic dysfunction or heart dysfunction.  Your treatment plan  Based on the results of your evaluation and tests, your doctor will develop a treatment plan. This plan is designed to relieve some of your heart failure symptoms and help make you more comfortable. Your treatment plan may include:  · Medicine to help your heart work better and improve your quality of life  · Changes in what you eat and drink to help prevent fluid from backing up in your body  · Daily monitoring of your weight and heart failure symptoms to see how well your treatment plan is working  · Exercise to help you stay healthy  · Help with quitting smoking  · Emotional and psychological support to help adjust to the changes  · Referrals to other specialists to make sure you are being treated comprehensively  © 7551-3375 [employer]. [address] [place] [address]. All rights reserved. This information is not intended as a substitute for professional medical care. Always follow your healthcare professional's instructions.              Heart Failure: Making Changes to Your Diet  You have a condition called heart failure. When you have heart failure, excess fluid is more likely to build up in your body because your heart isn't working well. This makes the heart work harder to pump blood. Fluid buildup causes symptoms such as shortness of breath and swelling (edema). This is often referred to as congestive heart failure or CHF. Controlling the amount of salt (sodium) you eat may help stop fluid from building up. Your doctor  may also tell you to reduce the amount of fluid you drink.  Reading food labels    Your healthcare provider will tell you how much sodium you can eat each day. Read food labels to keep track. Keep in mind that certain foods are high in salt. These include canned, frozen, and processed foods. Check the amount of sodium in each serving. Watch out for high-sodium ingredients. These include MSG (monosodium glutamate), baking soda, and sodium phosphate.   Eating less salt  Give yourself time to get used to eating less salt. It may take a little while. Here are some tips to help:  · Take the saltshaker off the table. Replace it with salt-free herb mixes and spices.  · Eat fresh or plain frozen vegetables. These have much less salt than canned vegetables.  · Choose low-sodium snacks like sodium-free pretzels, crackers, or air-popped popcorn.  · Dont add salt to your food when youre cooking. Instead, season your foods with pepper, lemon, garlic, or onion.  · When you eat out, ask that your food be cooked without added salt.  · Avoid eating fried foods as these often have a great deal of salt.  If youre told to limit fluids  You may need to limit how much fluid you have to help prevent swelling. This includes anything that is liquid at room temperature, such as ice cream and soup. If your doctor tells you to limit fluid, try these tips:  · Measure drinks in a measuring cup before you drink them. This will help you meet daily goals.  · Chill drinks to make them more refreshing.  · Suck on frozen lemon wedges to quench thirst.  · Only drink when youre thirsty.  · Chew sugarless gum or suck on hard candy to keep your mouth moist.  · Weigh yourself daily to know if your body's fluid content is rising.  My sodium goal  Your healthcare provider may give you a sodium goal to meet each day. This includes sodium found in food as well as salt that you add. My goal is to eat no more than ___________ mg of sodium per day.     When  to call your doctor  Call your doctor right away if you have any symptoms of worsening heart failure. These can include:  · Sudden weight gain  · Increased swelling of your legs or ankles  · Trouble breathing when youre resting or at night  · Increase in the number of pillows you have to sleep on  · Chest pain, pressure, discomfort, or pain in the jaw, neck, or back   © 6428-5851 [employer]. [address] [place] [address], [place] [address]. All rights reserved. This information is not intended as a substitute for professional medical care. Always follow your healthcare professional's instructions.              Heart Failure: Medicines to Help Your Heart    You have a condition called heart failure (also known as congestive heart failure, or CHF). Your doctor will likely prescribe medicines for heart failure and any underlying health problems you have. Most heart failure patients take one or more types of medicinen. Your healthcare provider will work to find the combination of medicines that works best for you.  Heart failure medicines  Here are the most common heart failure medicines:  · ACE inhibitors lower blood pressure and decrease strain on the heart. This makes it easier for the heart to pump. Angiotensin receptor blockers have similar effects. These are prescribed for some patients instead of ACE inhibitors.  · Beta-blockers relieve stress on the heart. They also improve symptoms. They may also improve the heart's pumping action over time.  · Diuretics (also called water pills) help rid your body of excess water. This can help rid your body of swelling (edema). Having less fluid to pump means your heart doesnt have to work as hard. Some diuretics make your body lose a mineral called potassium. Your doctor will tell you if you need to take supplements or eat more foods high in potassium.  · Digoxin helps your heart pump with more strength. This helps your heart pump more blood with each beat. So,  more oxygen-rich blood travels to the rest of the body.  · Aldosterone antagonists help alter hormones and decrease strain on the heart.  · Hydralazine and nitrates are two separate medicines used together to treat heart failure. They may come in one combination pill. They lower blood pressure and decrease how hard the heart has to pump.  Medicines for related conditions  Controlling other heart problems helps keep heart failure under control, too. Depending on other heart problems you have, medicines may be prescribed to:  · Lower blood pressure (antihypertensives).  · Lower cholesterol levels (statins).  · Prevent blood clots (anticoagulants or aspirin).  · Keep the heartbeat steady (antiarrhythmics).  © 0747-9370 [employer]. [address] [place] [address], [place] [address]. All rights reserved. This information is not intended as a substitute for professional medical care. Always follow your healthcare professional's instructions.              Heart Failure: Procedures That May Help    The heart is a muscle that pumps oxygen-rich blood to all parts of the body. When you have heart failure, the heart is not able to pump as well as it should. Blood and fluid may back up into the lungs (congestive heart failure), and some parts of the body dont get enough oxygen-rich blood to work normally. These problems lead to the symptoms of heart failure.     Certain procedures may help the heart pump better in some cases of heart failure. Some procedures are done to treat health problems that may have caused the heart failure such as coronary artery disease or heart rhythm problems. For more serious heart failure, other options are available.  Treating artery and valve problems  If you have coronary artery disease or valve disease, procedures may be done to improve blood flow. This helps the heart pump better, which can improve heart failure symptoms. First, your doctor may do a cardiac catheterization to help  detect clogged blood vessels or valve damage. During this procedure, a  thin tube (catheter) in inserted into a blood vessel and guided to the heart. There a dye is injected and a special type of X-ray (angiogram) is taken of the blood vessels. Procedures to open a blocked artery or fix damaged valves can also be done using catheterization.  · Angioplasty uses a balloon-tipped instrument at the end of the catheter. The balloon is inflated to widen the narrowed artery. In many cases, a stent is expanded to further support the narrowed artery. A stent is a metal mesh tube.  · Valve surgery repairs or replacement of faulty valves can also be done during catheterization so blood can flow properly through the chambers of the heart.  Bypass surgery is another option to help treat blocked arteries. It uses a healthy blood vessel from elsewhere in the body. The healthy blood vessel is attached above and below the blocked area so that blood can flow around the blocked artery.  Treating heart rhythm problems  A device may be placed in the chest to help a weak heart maintain a healthy, heartbeat so the heart can pump more effectively:  · Pacemaker. A pacemaker is an implanted device that regulates your heartbeat electronically. It monitors your heart's rhythm and generates a painless electric impulse that helps the heart beat in a regular rhythm. A pacemaker is programmed to meet your specific heart rhythm needs.  · Biventricular pacing/cardiac resynchronization therapy. A type of pacemaker that paces both pumping chambers of the heart at the same time to coordinate contractions and to improve the heart's function. Some people with heart failure are candidates for this therapy.  · Implantable cardioverter defibrillator. A device similar to a pacemaker that senses when the heart is beating too fast and delivers an electrical shock to convert the fast rhythm to a normal rhythm. This can be a life saving device.  In severe  cases  In more serious cases of heart failure when other treatments no longer work, other options may include:  · Ventricular assist devices (VADs). These are mechanical devices used to take over the pumping function for one or both of the heart's ventricles, or pumping chambers. A VAD may be necessary when heart failure progresses to the point that medicines and other treatments no longer help. In some cases, a VAD may be used as a bridge to transplant.  · Heart transplant. This is replacing the diseased heart with a healthy one from a donor. This is an option for a few people who are very sick. A heart transplant is very serious and not an option for all patients. Your doctor can tell you more.  © 9480-0465 [employer]. [address] [place] [address], [place] [address]. All rights reserved. This information is not intended as a substitute for professional medical care. Always follow your healthcare professional's instructions.              Heart Failure: Tracking Your Weight  You have a condition called heart failure. When you have heart failure, a sudden weight gain or a steady rise in weight is a warning sign that your body is retaining too much water and salt. This could mean your heart failure is getting worse. If left untreated, it can cause problems for your lungs and result in shortness of breath. Weighing yourself each day is the best way to know if youre retaining water. If your weight goes up quickly, call your doctor. You will be given instructions on how to get rid of the excess water. You will likely need medicines and to avoid salt. This will help your heart work better.  Call your doctor if you gain more than 2 pounds in 1 day, more than 5 pounds in 1 week, or whatever weight gain you were told to report by your doctor. This is often a sign of worsening heart failure and needs to be evaluated and treated. Your doctor will tell you what to do next.   Tips for weighing yourself    · Weigh  yourself at the same time each morning, wearing the same clothes. Weigh yourself after urinating and before eating.  · Use the same scale each day. Make sure the numbers are easy to read. Put the scale on a flat, hard surface -- not on a rug or carpet.  · Do not stop weighing yourself. If you forget one day, weigh again the next morning.  How to use your weight chart  · Keep your weight chart near the scale. Write your weight on the chart as soon as you get off the scale.  · Fill in the month and the start date on the chart. Then write down your weight each day. Your chart will look like this:    · If you miss a day, leave the space blank. Weigh yourself the next day and write your weight in the next space.  · Take your weight chart with you when you go to see your doctor.  © 6525-4712 [employer]. [address] [place] [address]. All rights reserved. This information is not intended as a substitute for professional medical care. Always follow your healthcare professional's instructions.              Heart Failure: Warning Signs of a Flare-Up  You have a condition called heart failure. Once you have heart failure, flare-ups can happen. Below are signs that can mean your heart failure is getting worse. If you notice any of these warning signs, call your healthcare provider.  Swelling    · Your feet, ankles, or lower legs get puffier.  · You notice skin changes on your lower legs.  · Your shoes feel too tight.  · Your clothes are tighter in the waist.  · You have trouble getting rings on or off your fingers.  Shortness of breath  · You have to breathe harder even when youre doing your normal activities or when youre resting.  · You are short of breath walking up stairs or even short distances.  · You wake up at night short of breath or coughing.  · You need to use more pillows or sit up to sleep.  · You wake up tired or restless.  Other warning signs  · You feel weaker, dizzy, or more  tired.  · You have chest pain or changes in your heartbeat.  · You have a cough that wont go away.  · You cant remember things or dont feel like eating.  Tracking your weight  Gaining weight is often the first warning sign that heart failure is getting worse. Gaining even a few pounds can be a sign that your body is retaining excess water and salt. Weighing yourself each day in the morning after you urinate and before you eat, is the best way to know if you're retaining water. Get a scale that is easy to read and make sure you wear the same clothes and use the same scale every time you weigh. Your healthcare provider will show you how to track your weight. Call your doctor if you gain more than 2 pounds in 1 day, 5 pounds in 1 week, or whatever weight gain you were told to report by your doctor. This is often a sign of worsening heart failure and needs to be evaluated and treated before it compromises your breathing. Your doctor will tell you what to do next.    © 1623-5122 [employer]. [address] [place] [address]. All rights reserved. This information is not intended as a substitute for professional medical care. Always follow your healthcare professional's instructions.              Chronic Kindey Disease Education             Diabetes Discharge Instructions                                   [facility] Sign-Up     Activating your [facility] account is as easy as 1-2-3!     1) Visit [email or web address], select Sign Up Now, enter this activation code and your date of birth, then select Next.  [email or web address]  Expires: 1/13/2017 10:06 AM      2) Create a username and password to use when you visit [facility] in the future and select a security question in case you lose your password and select Next.    3) Enter your e-mail address and click Sign Up!    Additional Information  If you have questions, please e-mail [email or web address] or call [phone] to talk to our [facility] staff.  Remember, [facility] is NOT to be used for urgent needs. For medical emergencies, dial 911.          [facility] complies with applicable Federal civil rights laws and does not discriminate on the basis of race, color, national origin, age, disability, or sex.

## 2017-01-03 NOTE — ED NOTES
Pt lying in bed, AAO x's 3. Pt stated that he came to the ER with c/o SOB second to edema. Pt reports a cardiac history.   APPEARANCE: Alert, oriented and in no acute distress.  CARDIAC: Normal rate and rhythm, no murmur heard.   PERIPHERAL VASCULAR: peripheral pulses present. Normal cap refill. +2 pitting edema to bilat upper and lower extremities. Warm to touch.    RESPIRATORY:Normal rate and effort, breath sound course with crackles in all lung fields. Respirations are equal but labored, mild amount of distress.  GASTRO: soft, bowel sounds normal, no tenderness, no abdominal distention, pt obese.  MUSC: Full ROM. No bony tenderness or soft tissue tenderness. No obvious deformity.  SKIN: Skin is warm and dry, decreased skin turgor mucous membranes moist.  NEURO: 5/5 strength major flexors/extensors bilaterally. Sensory intact to light touch bilaterally. Blythe coma scale: eyes open spontaneously-4, oriented & converses-5, obeys commands-6. No neurological abnormalities.   MENTAL STATUS: awake, alert and aware of environment.  EYE: PERRL, both eyes: pupils brisk and reactive to light. Normal size.  ENT: EARS: no obvious drainage. NOSE: no active bleeding.

## 2017-01-03 NOTE — LETTER
January 7, 2017         [address] [place] [address] [place] [address]  Phone: [phone]  Fax: [phone]       Patient: Ambrocio Chin  YOB: 1950  Date of Visit: 01/07/2017    To Whom It May Concern:    Margie Chin [birth year] was at [facility] on 01/06/17. She may return to work on 1/16/17 with restrictions. If you have any questions or concerns, or if I can be of further assistance, please do not hesitate to contact me.    Sincerely,    Justyn Albarran MD

## 2017-01-03 NOTE — ED PROVIDER NOTES
Encounter Date: 1/3/2017       History     Chief Complaint   Patient presents with    Shortness of Breath     accomapnied by dependent edema     Review of patient's allergies indicates:  No Known Allergies  HPI Comments: 67 yo M with history of CHF, DM and HTN here with SOB and dependent edema. He was sent in from the clinic with this complaint. He reports compliance with his medications. Endorses leg swelling, but denies pain. Denies nausea, vomiting or diarrhea.    Patient is a 66 y.o. male presenting with the following complaint: shortness of breath. The history is provided by the patient. The history is limited by a language barrier. A  was used.   Shortness of Breath   This is a recurrent problem. The problem occurs continuously.The current episode started more than 1 week ago. The problem has not changed since onset.Associated symptoms include cough, orthopnea and leg swelling. Pertinent negatives include no chest pain. It is unknown what precipitated the problem. He has tried nothing for the symptoms. He has had no prior hospitalizations. He has had no prior ED visits. Associated medical issues include heart failure.     Past Medical History   Diagnosis Date    CHF (congestive heart failure)     Diabetes mellitus     Hypertension     PVD (peripheral vascular disease)     Stroke      Lt pontine stroke 11/15/2016     Past Medical History Pertinent Negatives   Diagnosis Date Noted    Coronary artery disease 11/15/2016     Past Surgical History   Procedure Laterality Date    Toe amputation Left      5th toe     Family History   Problem Relation Age of Onset    Stroke Mother     Diabetes Father      Social History   Substance Use Topics    Smoking status: Never Smoker    Smokeless tobacco: None    Alcohol use No     Review of Systems   Constitutional: Negative for chills and diaphoresis.   Respiratory: Positive for cough and shortness of breath.    Cardiovascular: Positive for  orthopnea and leg swelling. Negative for chest pain.   All other systems reviewed and are negative.      Physical Exam   Initial Vitals   BP Pulse Resp Temp SpO2   01/03/17 1558 01/03/17 1558 -- 01/03/17 1558 01/03/17 1558   162/77 71  97.4 °F (36.3 °C) 90 %     Physical Exam    Nursing note and vitals reviewed.  Constitutional: He appears well-developed and well-nourished.   HENT:   Head: Normocephalic and atraumatic.   Eyes: Conjunctivae and EOM are normal. Pupils are equal, round, and reactive to light.   Neck: Normal range of motion.   Cardiovascular: Normal rate and regular rhythm.   Pulmonary/Chest: He is in respiratory distress. He has wheezes. He has rales.   Abdominal: Soft. Bowel sounds are normal.   Musculoskeletal: Normal range of motion.   Bilateral pitting edema   Neurological: He is alert and oriented to person, place, and time. He has normal strength. He displays normal reflexes. No cranial nerve deficit.   Skin: Skin is warm and dry.   Psychiatric: He has a normal mood and affect. His behavior is normal. Thought content normal.         ED Course   Critical Care  Date/Time: 1/3/2017 5:53 PM  Performed by: FILOMENA ANDREW  Authorized by: FILOMENA ANDREW   Direct patient critical care time: 15 minutes  Additional history critical care time: 10 minutes  Ordering / reviewing critical care time: 8 minutes  Documentation critical care time: 9 minutes  Consulting other physicians critical care time: 9 minutes  Total critical care time (exclusive of procedural time) : 51 minutes  Critical care was necessary to treat or prevent imminent or life-threatening deterioration of the following conditions: renal failure and respiratory failure.  Critical care was time spent personally by me on the following activities: discussions with consultants, discussions with primary provider, evaluation of patient's response to treatment, examination of patient, obtaining history from patient or surrogate, ordering and  performing treatments and interventions, ordering and review of laboratory studies, pulse oximetry, re-evaluation of patient's condition, review of old charts and transcutaneous pacing.        Labs Reviewed   CBC W/ AUTO DIFFERENTIAL - Abnormal; Notable for the following:        Result Value    RBC 2.80 (*)     Hemoglobin 8.2 (*)     Hematocrit 26.3 (*)     MCHC 31.2 (*)     RDW 16.3 (*)     MPV 8.7 (*)     Lymph # 0.5 (*)     Gran% 81.0 (*)     Lymph% 8.1 (*)     All other components within normal limits   COMPREHENSIVE METABOLIC PANEL - Abnormal; Notable for the following:     Potassium 6.0 (*)     Glucose 282 (*)     BUN, Bld 80 (*)     Creatinine 4.1 (*)     Calcium 7.8 (*)     Albumin 2.7 (*)     Anion Gap 5 (*)     eGFR if  17 (*)     eGFR if non  14 (*)     All other components within normal limits   B-TYPE NATRIURETIC PEPTIDE - Abnormal; Notable for the following:     BNP 1670 (*)     All other components within normal limits   TROPONIN I   PROTIME-INR   PROTEIN ELECTROPHORESIS, RANDOM URINE     EKG Readings: (Independently Interpreted)   Initial Reading: No STEMI. Previous EKG: Compared with most recent EKG Rhythm: Normal Sinus Rhythm. Heart Rate: 65. Ectopy: No Ectopy. Conduction: Normal. ST Segments: Normal ST Segments. T Waves: Normal. Axis: Normal.          Medical Decision Making:   History:   Old Medical Records: I decided to obtain old medical records.  Old Records Summarized: records from clinic visits.  Initial Assessment:   67 yo M with heart failure here grossly volume overloaded, hypoxic with leg edema  Differential Diagnosis:   CHF, renal failure, pneumonia  Clinical Tests:   Lab Tests: Ordered and Reviewed  The following lab test(s) were unremarkable: CMP, Troponin and CBC  Radiological Study: Ordered and Reviewed  Medical Tests: Ordered and Reviewed  ED Management:  Given lasix, albuterol, calcium, bicarb, dextrose and insulin for hyperK  Spoke with  renal  Ordered renal US and urine electrolytes   Admission to ICU with [facility]                   ED Course     Clinical Impression:   The primary encounter diagnosis was Congestive heart failure, unspecified congestive heart failure chronicity, unspecified congestive heart failure type. Diagnoses of Renal failure and Hyperkalemia were also pertinent to this visit.          Camille Driver MD  01/03/17 180       Camille Driver MD  01/03/17 1809

## 2017-01-04 LAB
ANION GAP SERPL CALC-SCNC: 7 MMOL/L
ANION GAP SERPL CALC-SCNC: 9 MMOL/L
BASOPHILS # BLD AUTO: 0.03 K/UL
BASOPHILS # BLD AUTO: 0.04 K/UL
BASOPHILS NFR BLD: 0.5 %
BASOPHILS NFR BLD: 0.7 %
BUN SERPL-MCNC: 76 MG/DL
BUN SERPL-MCNC: 78 MG/DL
CALCIUM SERPL-MCNC: 8.5 MG/DL
CALCIUM SERPL-MCNC: 8.7 MG/DL
CHLORIDE SERPL-SCNC: 107 MMOL/L
CHLORIDE SERPL-SCNC: 107 MMOL/L
CO2 SERPL-SCNC: 22 MMOL/L
CO2 SERPL-SCNC: 25 MMOL/L
CREAT CL/1.73 SQ M 12H UR+SERPL-ARVRAT: 16 ML/MIN
CREAT SERPL-MCNC: 3.8 MG/DL
CREAT UR-MCNC: 19.5 MG/DL
CREATININE, URINE (MG/SPEC): 877.5 MG/SPEC
DIFFERENTIAL METHOD: ABNORMAL
DIFFERENTIAL METHOD: ABNORMAL
EOSINOPHIL # BLD AUTO: 0.2 K/UL
EOSINOPHIL # BLD AUTO: 0.2 K/UL
EOSINOPHIL NFR BLD: 3.5 %
EOSINOPHIL NFR BLD: 3.7 %
ERYTHROCYTE [DISTWIDTH] IN BLOOD BY AUTOMATED COUNT: 16.4 %
ERYTHROCYTE [DISTWIDTH] IN BLOOD BY AUTOMATED COUNT: 16.4 %
EST. GFR  (AFRICAN AMERICAN): 18 ML/MIN/1.73 M^2
EST. GFR  (AFRICAN AMERICAN): 18 ML/MIN/1.73 M^2
EST. GFR  (NON AFRICAN AMERICAN): 16 ML/MIN/1.73 M^2
EST. GFR  (NON AFRICAN AMERICAN): 16 ML/MIN/1.73 M^2
FOLATE SERPL-MCNC: 13.6 NG/ML
GLUCOSE SERPL-MCNC: 108 MG/DL
GLUCOSE SERPL-MCNC: 56 MG/DL
HCT VFR BLD AUTO: 27.7 %
HCT VFR BLD AUTO: 27.7 %
HGB BLD-MCNC: 8.7 G/DL
HGB BLD-MCNC: 8.8 G/DL
INR PPP: 1.1
LYMPHOCYTES # BLD AUTO: 0.7 K/UL
LYMPHOCYTES # BLD AUTO: 0.7 K/UL
LYMPHOCYTES NFR BLD: 12.4 %
LYMPHOCYTES NFR BLD: 13.6 %
MAGNESIUM SERPL-MCNC: 3 MG/DL
MCH RBC QN AUTO: 29 PG
MCH RBC QN AUTO: 29.3 PG
MCHC RBC AUTO-ENTMCNC: 31.4 %
MCHC RBC AUTO-ENTMCNC: 31.8 %
MCV RBC AUTO: 92 FL
MCV RBC AUTO: 92 FL
MONOCYTES # BLD AUTO: 0.4 K/UL
MONOCYTES # BLD AUTO: 0.5 K/UL
MONOCYTES NFR BLD: 6.8 %
MONOCYTES NFR BLD: 8.8 %
NEUTROPHILS # BLD AUTO: 4.1 K/UL
NEUTROPHILS # BLD AUTO: 4.3 K/UL
NEUTROPHILS NFR BLD: 74.6 %
NEUTROPHILS NFR BLD: 75 %
PHOSPHATE SERPL-MCNC: 5.3 MG/DL
PLATELET # BLD AUTO: 307 K/UL
PLATELET # BLD AUTO: 309 K/UL
PMV BLD AUTO: 8.7 FL
PMV BLD AUTO: 8.7 FL
POCT GLUCOSE: 124 MG/DL (ref 70–110)
POCT GLUCOSE: 125 MG/DL (ref 70–110)
POCT GLUCOSE: 187 MG/DL (ref 70–110)
POCT GLUCOSE: 199 MG/DL (ref 70–110)
POCT GLUCOSE: 63 MG/DL (ref 70–110)
POTASSIUM SERPL-SCNC: 5.1 MMOL/L
POTASSIUM SERPL-SCNC: 5.8 MMOL/L
PROT 24H UR-MRATE: 2025 MG/SPEC
PROT UR-MCNC: 45 MG/DL
PROTHROMBIN TIME: 12 SEC
RBC # BLD AUTO: 3 M/UL
RBC # BLD AUTO: 3 M/UL
SODIUM SERPL-SCNC: 138 MMOL/L
SODIUM SERPL-SCNC: 139 MMOL/L
TROPONIN I SERPL DL<=0.01 NG/ML-MCNC: 0.01 NG/ML
TROPONIN I SERPL DL<=0.01 NG/ML-MCNC: 0.01 NG/ML
URINE COLLECTION DURATION: 24 HR
URINE COLLECTION DURATION: 24 HR
URINE VOLUME: 4500 ML
URINE VOLUME: 4500 ML
VIT B12 SERPL-MCNC: 454 PG/ML
WBC # BLD AUTO: 5.43 K/UL
WBC # BLD AUTO: 5.71 K/UL

## 2017-01-04 PROCEDURE — 25000242 PHARM REV CODE 250 ALT 637 W/ HCPCS: Performed by: INTERNAL MEDICINE

## 2017-01-04 PROCEDURE — 83735 ASSAY OF MAGNESIUM: CPT

## 2017-01-04 PROCEDURE — 86335 IMMUNFIX E-PHORSIS/URINE/CSF: CPT | Mod: 26,,, | Performed by: PATHOLOGY

## 2017-01-04 PROCEDURE — 63600175 PHARM REV CODE 636 W HCPCS: Performed by: INTERNAL MEDICINE

## 2017-01-04 PROCEDURE — 82575 CREATININE CLEARANCE TEST: CPT

## 2017-01-04 PROCEDURE — 86335 IMMUNFIX E-PHORSIS/URINE/CSF: CPT

## 2017-01-04 PROCEDURE — 27000221 HC OXYGEN, UP TO 24 HOURS

## 2017-01-04 PROCEDURE — 84100 ASSAY OF PHOSPHORUS: CPT

## 2017-01-04 PROCEDURE — 94761 N-INVAS EAR/PLS OXIMETRY MLT: CPT

## 2017-01-04 PROCEDURE — 11000001 HC ACUTE MED/SURG PRIVATE ROOM

## 2017-01-04 PROCEDURE — 25000003 PHARM REV CODE 250: Performed by: STUDENT IN AN ORGANIZED HEALTH CARE EDUCATION/TRAINING PROGRAM

## 2017-01-04 PROCEDURE — 85025 COMPLETE CBC W/AUTO DIFF WBC: CPT | Mod: 91

## 2017-01-04 PROCEDURE — 25000003 PHARM REV CODE 250: Performed by: INTERNAL MEDICINE

## 2017-01-04 PROCEDURE — 82746 ASSAY OF FOLIC ACID SERUM: CPT

## 2017-01-04 PROCEDURE — 83520 IMMUNOASSAY QUANT NOS NONAB: CPT

## 2017-01-04 PROCEDURE — 82607 VITAMIN B-12: CPT

## 2017-01-04 PROCEDURE — 94640 AIRWAY INHALATION TREATMENT: CPT

## 2017-01-04 PROCEDURE — 93005 ELECTROCARDIOGRAM TRACING: CPT

## 2017-01-04 PROCEDURE — 85610 PROTHROMBIN TIME: CPT

## 2017-01-04 PROCEDURE — 80048 BASIC METABOLIC PNL TOTAL CA: CPT

## 2017-01-04 PROCEDURE — 94664 DEMO&/EVAL PT USE INHALER: CPT

## 2017-01-04 PROCEDURE — 84484 ASSAY OF TROPONIN QUANT: CPT

## 2017-01-04 PROCEDURE — 36415 COLL VENOUS BLD VENIPUNCTURE: CPT

## 2017-01-04 PROCEDURE — 86580 TB INTRADERMAL TEST: CPT | Performed by: INTERNAL MEDICINE

## 2017-01-04 PROCEDURE — 82043 UR ALBUMIN QUANTITATIVE: CPT

## 2017-01-04 PROCEDURE — 51798 US URINE CAPACITY MEASURE: CPT

## 2017-01-04 PROCEDURE — 84156 ASSAY OF PROTEIN URINE: CPT

## 2017-01-04 RX ORDER — ASPIRIN 81 MG/1
81 TABLET ORAL DAILY
Status: DISCONTINUED | OUTPATIENT
Start: 2017-01-04 | End: 2017-01-09 | Stop reason: HOSPADM

## 2017-01-04 RX ORDER — METOLAZONE 5 MG/1
10 TABLET ORAL DAILY
Status: DISCONTINUED | OUTPATIENT
Start: 2017-01-04 | End: 2017-01-09 | Stop reason: HOSPADM

## 2017-01-04 RX ORDER — ALBUTEROL SULFATE 2.5 MG/.5ML
2.5 SOLUTION RESPIRATORY (INHALATION)
Status: DISCONTINUED | OUTPATIENT
Start: 2017-01-04 | End: 2017-01-05

## 2017-01-04 RX ADMIN — INSULIN DETEMIR 12 UNITS: 100 INJECTION, SOLUTION SUBCUTANEOUS at 09:01

## 2017-01-04 RX ADMIN — BUMETANIDE 1 MG/HR: 0.25 INJECTION INTRAMUSCULAR; INTRAVENOUS at 05:01

## 2017-01-04 RX ADMIN — HYDRALAZINE HYDROCHLORIDE 50 MG: 25 TABLET ORAL at 09:01

## 2017-01-04 RX ADMIN — ALBUTEROL SULFATE 2.5 MG: 2.5 SOLUTION RESPIRATORY (INHALATION) at 08:01

## 2017-01-04 RX ADMIN — CITALOPRAM HYDROBROMIDE 20 MG: 20 TABLET ORAL at 08:01

## 2017-01-04 RX ADMIN — METOLAZONE 10 MG: 5 TABLET ORAL at 11:01

## 2017-01-04 RX ADMIN — IRON SUCROSE 100 MG: 20 INJECTION, SOLUTION INTRAVENOUS at 01:01

## 2017-01-04 RX ADMIN — CLONIDINE 1 PATCH: 0.1 PATCH, EXTENDED RELEASE TRANSDERMAL at 08:01

## 2017-01-04 RX ADMIN — SODIUM CHLORIDE, PRESERVATIVE FREE 3 ML: 5 INJECTION INTRAVENOUS at 09:01

## 2017-01-04 RX ADMIN — HYDRALAZINE HYDROCHLORIDE 50 MG: 25 TABLET ORAL at 01:01

## 2017-01-04 RX ADMIN — SODIUM CHLORIDE, PRESERVATIVE FREE 3 ML: 5 INJECTION INTRAVENOUS at 02:01

## 2017-01-04 RX ADMIN — BUMETANIDE 1 MG/HR: 0.25 INJECTION INTRAMUSCULAR; INTRAVENOUS at 08:01

## 2017-01-04 RX ADMIN — ALBUTEROL SULFATE 2.5 MG: 2.5 SOLUTION RESPIRATORY (INHALATION) at 02:01

## 2017-01-04 RX ADMIN — HYDRALAZINE HYDROCHLORIDE 50 MG: 25 TABLET ORAL at 05:01

## 2017-01-04 RX ADMIN — SODIUM CHLORIDE, PRESERVATIVE FREE 3 ML: 5 INJECTION INTRAVENOUS at 05:01

## 2017-01-04 RX ADMIN — TUBERCULIN PURIFIED PROTEIN DERIVATIVE 5 UNITS: 5 INJECTION, SOLUTION INTRADERMAL at 01:01

## 2017-01-04 RX ADMIN — FERROUS SULFATE TAB EC 325 MG (65 MG FE EQUIVALENT) 325 MG: 325 (65 FE) TABLET DELAYED RESPONSE at 08:01

## 2017-01-04 RX ADMIN — TRAZODONE HYDROCHLORIDE 50 MG: 50 TABLET ORAL at 09:01

## 2017-01-04 RX ADMIN — AMLODIPINE BESYLATE 10 MG: 5 TABLET ORAL at 09:01

## 2017-01-04 RX ADMIN — ALBUTEROL SULFATE 5 MG: 2.5 SOLUTION RESPIRATORY (INHALATION) at 08:01

## 2017-01-04 RX ADMIN — ROSUVASTATIN CALCIUM 40 MG: 10 TABLET, FILM COATED ORAL at 08:01

## 2017-01-04 RX ADMIN — ASPIRIN 81 MG: 81 TABLET, COATED ORAL at 08:01

## 2017-01-04 RX ADMIN — Medication 1 CAPSULE: at 11:01

## 2017-01-04 NOTE — PROGRESS NOTES
Progress Note  Nephrology      Consult Requested By: Jorge Thomas MD      SUBJECTIVE:     Overnight events  Patient is a 66 y.o. male     Patient Active Problem List   Diagnosis    Hypertension    Type 2 diabetes mellitus with hyperglycemia, with long-term current use of insulin    Hyperlipidemia    CKD stage 3 due to type 2 diabetes mellitus    JANEL (acute kidney injury)    PVD (peripheral vascular disease) with claudication    Acute on chronic systolic heart failure    Chronic pulmonary edema    Stenosis of left carotid artery    Iron deficiency anemia    Left pontine stroke    Fever    Oropharyngeal dysphagia    Aspiration pneumonia of left lower lobe    Left-sided cerebrovascular accident (CVA)    Benign essential HTN    Long term (current) use of anticoagulants    Thrombus    Congestive heart failure    Hyperkalemia     Past Medical History   Diagnosis Date    CHF (congestive heart failure)     Diabetes mellitus     Hypertension     PVD (peripheral vascular disease)     Stroke      Lt pontine stroke 11/15/2016              OBJECTIVE:     Vitals:    01/04/17 0705 01/04/17 0800 01/04/17 0824 01/04/17 0900   BP:  125/80     BP Location:  Left arm     Patient Position:  Lying     BP Method:  Automatic     Pulse: 72 78 74 70   Resp:  19 20    Temp:  98.1 °F (36.7 °C)     TempSrc:  Oral     SpO2:   97%    Weight:       Height:           Temp: 98.1 °F (36.7 °C) (01/04/17 0800)  Pulse: 70 (01/04/17 0900)  Resp: 20 (01/04/17 0824)  BP: 125/80 (01/04/17 0800)  SpO2: 97 % (01/04/17 0824)      Date 01/04/17 0700 - 01/05/17 0659   Shift 4426-3992 9744-2387 9326-1321 24 Hour Total   I  N  T  A  K  E   P.O. 300   300    Shift Total  (mL/kg) 300  (3)   300  (3)   O  U  T  P  U  T   Urine  (mL/kg/hr) 600   600    Shift Total  (mL/kg) 600  (6)   600  (6)   Weight (kg) 99.5 99.5 99.5 99.5             Medications:   albuterol sulfate  5 mg Nebulization Q6H WAKE    amlodipine  10 mg Oral QHS     aspirin  81 mg Oral Daily    calcium gluconate IVPB  1,000 mg Intravenous Once    citalopram  20 mg Oral Daily    cloNIDine 0.1 mg/24 hr td ptwk  1 patch Transdermal Q7 Days    ferrous sulfate  325 mg Oral Daily    hydrALAZINE  50 mg Oral TID    insulin detemir  12 Units Subcutaneous QHS    rosuvastatin  40 mg Oral Daily    sodium chloride 0.9%  3 mL Intravenous Q8H    trazodone  50 mg Oral QHS      bumetanide (BUMEX) infusion 1 mg/hr (01/04/17 0833)               Physical Exam:  General appearance: NAD  Feeling better  SOB  Lungs: diminished breath sounds  Heart: Pulse 70  Abdomen: soft  Extremities: edema  Laboratory:  ABG  Labs reviewed  Recent Results (from the past 336 hour(s))   Basic metabolic panel     Collection Time: 01/04/17  6:15 AM   Result Value Ref Range    Sodium 139 136 - 145 mmol/L    Potassium 5.1 3.5 - 5.1 mmol/L    Chloride 107 95 - 110 mmol/L    CO2 25 23 - 29 mmol/L    BUN, Bld 76 (H) 8 - 23 mg/dL    Creatinine 3.8 (H) 0.5 - 1.4 mg/dL    Calcium 8.7 8.7 - 10.5 mg/dL    Anion Gap 7 (L) 8 - 16 mmol/L   Basic metabolic panel    Collection Time: 01/03/17 11:41 PM   Result Value Ref Range    Sodium 138 136 - 145 mmol/L    Potassium 5.8 (H) 3.5 - 5.1 mmol/L    Chloride 107 95 - 110 mmol/L    CO2 22 (L) 23 - 29 mmol/L    BUN, Bld 78 (H) 8 - 23 mg/dL    Creatinine 3.8 (H) 0.5 - 1.4 mg/dL    Calcium 8.5 (L) 8.7 - 10.5 mg/dL    Anion Gap 9 8 - 16 mmol/L   Basic metabolic panel    Collection Time: 01/03/17  8:08 PM   Result Value Ref Range    Sodium 138 136 - 145 mmol/L    Potassium 5.7 (H) 3.5 - 5.1 mmol/L    Chloride 107 95 - 110 mmol/L    CO2 21 (L) 23 - 29 mmol/L    BUN, Bld 77 (H) 8 - 23 mg/dL    Creatinine 4.0 (H) 0.5 - 1.4 mg/dL    Calcium 8.6 (L) 8.7 - 10.5 mg/dL    Anion Gap 10 8 - 16 mmol/L     Recent Results (from the past 336 hour(s))   CBC auto differential    Collection Time: 01/04/17  6:15 AM   Result Value Ref Range    WBC 5.71 3.90 - 12.70 K/uL    Hemoglobin 8.7 (L) 14.0 - 18.0  g/dL    Hematocrit 27.7 (L) 40.0 - 54.0 %    Platelets 307 150 - 350 K/uL   CBC auto differential    Collection Time: 01/04/17  6:15 AM   Result Value Ref Range    WBC 5.43 3.90 - 12.70 K/uL    Hemoglobin 8.8 (L) 14.0 - 18.0 g/dL    Hematocrit 27.7 (L) 40.0 - 54.0 %    Platelets 309 150 - 350 K/uL   CBC auto differential    Collection Time: 01/03/17  4:51 PM   Result Value Ref Range    WBC 5.78 3.90 - 12.70 K/uL    Hemoglobin 8.2 (L) 14.0 - 18.0 g/dL    Hematocrit 26.3 (L) 40.0 - 54.0 %    Platelets 273 150 - 350 K/uL     Urinalysis    Recent Labs  Lab 01/03/17  1820   COLORU Yellow   SPECGRAV 1.020   PHUR 6.0   PROTEINUA 2+*   BACTERIA None   NITRITE Negative   LEUKOCYTESUR Negative   UROBILINOGEN Negative   HYALINECASTS 0       Diagnostic Results:  X-Ray: Reviewed  US: Reviewed  Echo: Reviewed  ACCESS    ASSESSMENT/PLAN:     JANEL on CKD 4/A3  UA protein 2+.  US-  Increased resistive index involving the right kidney. Followup is suggested.  Bilateral echogenic kidneys, compatible with medical renal disease.  Creatinine 3.8 today.  Azotemia. BUN 76.   cc/h.  Potassium 5.1.  Metabolic bone disease.  Hyperphosphatemia.  Phos 5.3.  Anemia.  Poor nutrition.  Hypertension.  Edema.  Bumex drip.  Zaroxolyn.  Weight daily. I and O.  Renal, ADA diet.  Discussed about   dialysis.

## 2017-01-04 NOTE — PLAN OF CARE
Problem: Patient Care Overview  Goal: Plan of Care Review  Outcome: Ongoing (interventions implemented as appropriate)  Pt on oxygen in no apparent distress.  Breathing tx. Given with ok pt. Effort.  Will cont. To monitor.

## 2017-01-04 NOTE — PLAN OF CARE
Pt reports he lives alone and is current with [facility] and has all DME. PCP is Dr. Eaton at [facility] in [place].Case discussed in MDR- Pt on Bumex drip and renal workup in progress. TN to follow.     01/04/17 1049   Discharge Assessment   Assessment Type Discharge Planning Assessment   Confirmed/corrected address and phone number on facesheet? Yes   Assessment information obtained from? Patient   Expected Length of Stay (days) 2   Communicated expected length of stay with patient/caregiver yes   Prior to hospitilization cognitive status: Alert/Oriented   Prior to hospitalization functional status: Independent;Assistive Equipment   Current cognitive status: Alert/Oriented   Current Functional Status: Independent;Assistive Equipment   Arrived From home health   Lives With alone   Able to Return to Prior Arrangements yes   Is patient able to care for self after discharge? Yes   Who are your caregiver(s) and their phone number(s)? Ambrocio Spicer (friend) [phone]   Patient's perception of discharge disposition home health   Readmission Within The Last 30 Days no previous admission in last 30 days   Patient currently being followed by outpatient case management? No   Patient currently receives home health services? Yes  (current with [facility] and wishes to resume services upon discharge)   Does the patient currently use HME? Yes   Name and contact number for HME provider: [facility]   Patient currently receives private duty nursing? No   Patient currently receives any other outside agency services? No   Equipment Currently Used at Home walker, rolling;3-in-1 commode;bath bench   Do you have any problems affording any of your prescribed medications? No   Is the patient taking medications as prescribed? yes   Do you have any financial concerns preventing you from receiving the healthcare you need? No   Does the patient have transportation to healthcare appointments? Yes   Transportation Available family or friend will provide   On  Dialysis? No   Does the patient receive services at the [facility]? No   Are there any open cases? No   Discharge Plan A Home Health;Home   Discharge Plan B Home with family;Home Health   Patient/Family In Agreement With Plan yes

## 2017-01-04 NOTE — H&P
[facility] Internal Medicine History and Physical - Resident Note    Admitting Team: [facility] Internal Medicine Team B  Attending Physician: Dr. Thomas  Resident: Xuan  Interns: Deion    Date of Admit: 1/3/2017    Chief Complaint     Shortness of Breath x3 days    Subjective:      History of Present Illness:    67yo British speaking M w/ PMH HFrEF, L pontine infarct on 11/15/16 s/p inpatient rehab discharged 12/2/16, LV thrombus on warfarin, HTN, CAD s/p MI, DM2, HLD, CKD4, diabetic retinopathy with complaint of SOB x3 days. Patient was in his usual state of heal (able to perform all his ADLs, manages his own medications) until 3 days ago when he began to feel increasing short of breath. She states shortness of breath is worse with exertion. At baseline, he can ambulate 100-200ft with out SOB and fatigue, however he currently can only take a few steps without SOB and fatigue. He denies any chest pains or cough, but does admit to occasional palpitations that will wake him from sleep at night. He states he sleeps with 2 pillows however admits to have to sleep sitting up and feels short of breath when sleepign laying down. He admits to 10lb weight gain over the past 2 weeks. Patient denies fever, chills, chest pain, cough, abdominal pain, dizziness, lightheadedness.    Past Medical History:  Past Medical History   Diagnosis Date    CHF (congestive heart failure)     Diabetes mellitus     Hypertension     PVD (peripheral vascular disease)     Stroke      Lt pontine stroke 11/15/2016       Past Surgical History:  Past Surgical History   Procedure Laterality Date    Toe amputation Left      5th toe       Allergies:  Review of patient's allergies indicates:  No Known Allergies    Home Medications:  Prior to Admission medications    Medication Sig Start Date End Date Taking? Authorizing Provider   amlodipine (NORVASC) 10 MG tablet Take 1 tablet (10 mg total) by mouth every evening. 12/2/16 12/2/17  Demetris Escobar MD    carvedilol (COREG) 12.5 MG tablet Take 12.5 mg by mouth 2 (two) times daily with meals.    Historical Provider, MD   citalopram (CELEXA) 20 MG tablet Take 1 tablet (20 mg total) by mouth once daily. 12/2/16 12/2/17  Demetris Escobar MD   cloNIDine 0.1 mg/24 hr td ptwk (CATAPRES) 0.1 mg/24 hr Place 1 patch onto the skin every 7 days. 12/9/16 12/9/17  Demetris Escobar MD   ferrous sulfate 325 (65 FE) MG EC tablet Take 1 tablet (325 mg total) by mouth once daily. 12/2/16   Demetris Escobar MD   furosemide (LASIX) 40 MG tablet Take 1 tablet (40 mg total) by mouth 2 (two) times daily. 12/16/16 12/16/17  Camille Driver MD   hydrALAZINE (APRESOLINE) 50 MG tablet Take 1 tablet (50 mg total) by mouth 3 (three) times daily. 12/2/16 12/2/17  Demetris Escobar MD   insulin glargine (LANTUS) 100 unit/mL injection Inject 12 Units into the skin every evening. 12/2/16   Demetris Escobar MD   lisinopril (PRINIVIL,ZESTRIL) 40 MG tablet Take 40 mg by mouth once daily.    Historical Provider, MD   rosuvastatin (CRESTOR) 20 MG tablet Take 2 tablets (40 mg total) by mouth once daily. 12/2/16   Demetris Escobra MD   trazodone (DESYREL) 50 MG tablet Take 1 tablet (50 mg total) by mouth every evening. 12/2/16 12/2/17  Demetris Escobar MD   warfarin (COUMADIN) 4 MG tablet Take 1 tablet (4 mg total) by mouth Daily. 12/2/16 12/2/17  Demetris Escobar MD   furosemide (LASIX) 40 MG tablet Take 40 mg by mouth 2 (two) times daily.  1/3/17  Historical Provider, MD       Family History:  Family History   Problem Relation Age of Onset    Stroke Mother     Diabetes Father        Social History:  Social History   Substance Use Topics    Smoking status: Never Smoker    Smokeless tobacco: None    Alcohol use No       Review of Systems:  Pertinent positives and negatives listed in HPI. All other systems are reviewed and are negative.    Health Maintaince :   Primary Care Physician: [facility]       Objective:  "  Last 24 Hour Vital Signs:  BP  Min: 162/77  Max: 162/77  Temp  Av.4 °F (36.3 °C)  Min: 97.4 °F (36.3 °C)  Max: 97.4 °F (36.3 °C)  Pulse  Av.3  Min: 61  Max: 88  Resp  Av.5  Min: 16  Max: 19  SpO2  Av.8 %  Min: 90 %  Max: 99 %  Height  Av' 3" (160 cm)  Min: 5' 3" (160 cm)  Max: 5' 3" (160 cm)  Weight  Av.3 kg (208 lb)  Min: 94.3 kg (208 lb)  Max: 94.3 kg (208 lb)  Body mass index is 36.85 kg/(m^2).       Physical Examination:  General: Alert and awake in no apparent distress  Head:  Normocephalic and atraumatic  Eyes:  PERRL; EOMi with anicteric sclera and clear conjunctivae  Mouth:  Oropharynx clear and without exudate; moist mucous membranes  Cardio:  Regular rate and rhythm with normal S1 and S2; no murmurs or rubs. JVP 15cm.  Resp:  Rhonchi and expiratory wheezes throughout, no accessory muscle use  Extrem: Warm and well-perfused with no clubbing, 4+ pitting edema to thighs and on hands bilaterally.  Skin:  No rashes, lesions, or color changes  Pulses: 2+ and symmetric distally  Neuro:  AAOx3; cooperative and pleasant, 5/5 all extremities, no focal deficits.    Laboratory:  Most Recent Data:  CBC: Lab Results   Component Value Date    WBC 5.78 2017    HGB 8.2 (L) 2017    HCT 26.3 (L) 2017     2017    MCV 94 2017    RDW 16.3 (H) 2017     BMP: Lab Results   Component Value Date     2017    K 6.0 (H) 2017     2017    CO2 25 2017    BUN 80 (H) 2017    CREATININE 4.1 (H) 2017     (H) 2017    CALCIUM 7.8 (L) 2017    MG 2.0 2016    PHOS 5.1 (H) 2017     LFTs: Lab Results   Component Value Date    PROT 6.2 2017    ALBUMIN 2.7 (L) 2017    BILITOT 0.4 2017    AST 16 2017    ALKPHOS 124 2017    ALT 25 2017     Coags:   Lab Results   Component Value Date    INR 1.1 2017     FLP: Lab Results   Component Value Date    CHOL 183 " 11/15/2016    HDL 27 (L) 11/15/2016    LDLCALC 126.6 11/15/2016    TRIG 147 11/15/2016    CHOLHDL 14.8 (L) 11/15/2016     DM: Lab Results   Component Value Date    HGBA1C 10.5 (H) 11/15/2016    LDLCALC 126.6 11/15/2016    CREATININE 4.1 (H) 01/03/2017     Thyroid: Lab Results   Component Value Date    TSH 1.872 11/15/2016     Anemia: Lab Results   Component Value Date    IRON <10 (L) 11/16/2016    TIBC 155 (L) 11/16/2016    FERRITIN 546 (H) 11/16/2016     Cardiac: Lab Results   Component Value Date    TROPONINI 0.006 01/03/2017    BNP 1670 (H) 01/03/2017     Urinalysis: Lab Results   Component Value Date    COLORU Yellow 01/03/2017    SPECGRAV 1.020 01/03/2017    NITRITE Negative 01/03/2017    KETONESU Negative 01/03/2017    UROBILINOGEN Negative 01/03/2017    WBCUA 2 01/03/2017       Trended Cardiac Data:    Recent Labs  Lab 01/03/17  1651   TROPONINI 0.006   BNP 1670*         Other Results:  EKG (my interpretation): 1/3/16 EKG: Normal sinus. Q-waves in lateral and posterior leads. Poor R-wave progression. (Stable from previous)    Radiology:  Imaging Results         US Kidney Only (Final result) Result time:  01/03/17 19:46:23    Final result by Jed Love MD (01/03/17 19:46:23)    Impression:      No evidence of hydronephrosis or nephrolithiasis.    Increased resistive index involving the right kidney.  Followup is suggested.    Bilateral echogenic kidneys, compatible with medical renal disease.      Electronically signed by: JED LOVE MD  Date:     01/03/17  Time:    19:46     Narrative:    [id number]  01/03/17  19:15:46 [id number] ([facility]) : US KIDNEY ONLY    SUPPLIED CLINICAL HISTORY:  kidney failure    ADDITIONAL CLINICAL HISTORY: N/A    TECHNIQUE: Ultrasound the kidneys.    COMPARISON: 11/16/2016.    FINDINGS:  The right kidney measures 13.0 x 6.1 x 5.3 cm.  The right kidney appears echogenic.  There is an elevated resistive index on the right side measuring 0.8.  There is a cyst in the lower pole of the right  kidney with maximal diameter of 4.1 cm.  There is no evidence of hydronephrosis or nephrolithiasis.    The left kidney measures 10.4 x 5.5 x 5.8 cm.  The left kidney appears echogenic.  There is normal perfusion to the left kidney.  There is no evidence of nephrolithiasis or hydronephrosis.            X-Ray Chest AP Portable (Final result) Result time:  01/03/17 17:11:29    Final result by Jed Love MD (01/03/17 17:11:29)    Impression:       Stable examination with findings of cardiomegaly and pulmonary vascular congestion along with pulmonary edema and pleural effusions, consistent with CHF.              Electronically signed by: JED LOVE MD  Date:     01/03/17  Time:    17:11     Narrative:    Exam: [id number]  01/03/17  16:29:22 [id number] ([facility]) : XR CHEST AP PORTABLE    Technique:    Single frontal chest x-ray    Comparison:    12/16/2016    Findings:      There is a loop recorder present.  The trachea is unremarkable.  There is enlargement of the cardiomediastinal silhouette.  There is obscuration of both hemidiaphragms.  There are stable bilateral pleural effusions with left greater than right.  There is no evidence of a pneumothorax.  There is stable pulmonary edema.  Bibasilar air space opacities are present.  The osseous structures are within normal limits.                 Assessment:     Ambrocio Chin is a 66 y.o. male with:  Patient Active Problem List    Diagnosis Date Noted    Congestive heart failure 01/03/2017    Long term (current) use of anticoagulants 12/05/2016    Thrombus 12/05/2016    Aspiration pneumonia of left lower lobe 11/18/2016    Left-sided cerebrovascular accident (CVA) 11/18/2016    Benign essential HTN 11/18/2016    Fever 11/17/2016    Oropharyngeal dysphagia 11/17/2016    Iron deficiency anemia 11/16/2016    Left pontine stroke 11/16/2016    Stenosis of left carotid artery     Hypertension 11/15/2016    Type 2 diabetes mellitus with hyperglycemia, with long-term current  use of insulin 11/15/2016    Hyperlipidemia 11/15/2016    CKD stage 3 due to type 2 diabetes mellitus 11/15/2016    JANEL (acute kidney injury) 11/15/2016    PVD (peripheral vascular disease) with claudication 11/15/2016    Acute on chronic systolic heart failure 11/15/2016    Chronic pulmonary edema 11/15/2016        Plan:       65yo Occitan speaking M w/ PMH HFrEF, L pontine infarct on 11/15/16 s/p inpatient rehab discharged 12/2/16, LV thrombus on warfarin, HTN, DM2, HLD, CKD4, diabetic retinopathy with complaint of SOB x3 days.    Acute Exacerbation of HFrEF  - Patient grossly fluid overloaded. SOB x 3 days, 10lb weight gain over past 2 weeks  - 4+ pitting edema to thighs and on hands bilaterally, JVP 15cm  - 11/16/16 TTE: LV EF 35%  - 1/3/16 CXR: cardiomegaly and pulmonary vascular congestion along with pulmonary edema and pleural effusions consistent w/ CHF  - At admission, BNP 1670  - In ED Troponin x1 and EKG neg for ischemia  - Hold home Coreg in setting acute HFrEF exacerbation. Hold home lisinopril in setting of JANEL  - Metolazone 10mg PO x1 in ED  - Continue Bumex GTT as per Nephrology recommendations, monitor I&O and daily weights  - Consult Nephrology, discuss possible dialysis in AM    LV Thrombus  - 11/17/16 TTE: small round apical thrombus attached to the lateral part of the apex.  - Patient previously started on Warfarin, concern for medication non-compliance  - At admission, sub-theraputic INR 1.1  - Hold Coumadin for now for possible dialysis line placement in AM    Hx L pontine CVA  - 11/15/16 admitted for L pontine infarct, likely source was LV Thrombus  - S/p inpatient rehab discharged 12/2/16  - Stable mild right sided deficits, mild dysarthria, no acute issues  - Continue home aspirin, plavix, hold coumadin for now for possible line placement    JANEL on CKD4  - Likely secondary to cardio-renal syndrome due to volume overload secondary to HFrEF  - At admission, Cr 4.1 (baseline Cr 2.6-2.9).    - Hold home lisinopril in setting of JANEL  - F/u urine labs  - Nephrology consulted, will recheck BMP in AM, will consider dialysis  - Continue diuresis w/ Bumex GTT    Palpitiaions  - S/p loop Recorder implanted 11/17/16  - Currently no acute issues, will monitor on telemetry    Hyperkalemia  - On admission, K 6.0 (previously 12/16/16 K 5.7)  - No EKG changes, no peaked T-waves, no ST-changes, no widening of QRS  - Ca Gluconate, Albuterol Neb, Kayexalate given in ED  - Continue on Bumex GTT, f/u BMP at midnight, consider dialysis    HTN  - On admission, /77  - Continue on home amlodipine, clonidine 0.1mg/24hr patch, hydralazine tid,   - Hold home coreg due to acute HFrEF decompensation     Normocytic Anemia  - At admission H/H 8.2/26.3 MCV 94  - Likely secondary to iron deficiency and CKD  - 11/16/16 Iron <10, Ferritin 546  - F/u B12 and Folate  - Continue home ferrous sulfate/colace    DM2 w/ neuropathy, nephropathy, retinopathy  - 11/15/16 A1C 10.5  - Continue home dose insulin Detemir 12U + SSI, monitor glucose POCT, will add/titrate insulin as needed    HLD  - 11/15/16 Lipid Panel: TChol 183, , HDL 27  - Continue home rosuvastatin    Anxiety/Depression  - Stable, denies SI/HI  - Continue home citalopram and trazodone qHS    DVT PPx: SCDs for now (may restart anti-coagulation pending possible dialysis line placement)  Diet: Renal  Code: Full    Disposition: pending clinical improvement    Jutsyn Albarran  [facility] Internal Medicine HO-I  [facility] Internal Medicine Service    [facility] Medicine Hospitalist Pager numbers:   [facility] Hospitalist Medicine Team A (Patsy/Nando): [phone]  [facility] Hospitalist Medicine Team B (Lukas/Martha):  [phone]

## 2017-01-04 NOTE — CONSULTS
NEPHROLOGY CONSULT NOTE    HPI & INTERVAL HISTORY:    Past Medical History   Diagnosis Date    CHF (congestive heart failure)     Diabetes mellitus     Hypertension     PVD (peripheral vascular disease)     Stroke      Lt pontine stroke 11/15/2016      Past Surgical History   Procedure Laterality Date    Toe amputation Left      5th toe      Review of patient's allergies indicates:  No Known Allergies     (Not in a hospital admission)    Social History     Social History    Marital status: Single     Spouse name: N/A    Number of children: N/A    Years of education: N/A     Occupational History    Not on file.     Social History Main Topics    Smoking status: Never Smoker    Smokeless tobacco: Not on file    Alcohol use No    Drug use: No    Sexual activity: Not on file     Other Topics Concern    Not on file     Social History Narrative        [employer]   calcium gluconate 1G in 100mL D5W  1 g Intravenous ED 1 Time        ROS:          CONTINOUS INFUSIONS:    No intake or output data in the 24 hours ending 01/03/17 1824     HEMODYNAMICS:  C/o SOB worse with exertion, feeling cold, weight gain 10 lbs over 2 weeks, worsening  low extremities  edema.  Denies CP, cough, fever, chills, nausea, vomiting, diarrhea, dysuria, hematuria.  Reports good appetite.  Temp:  [97.4 °F (36.3 °C)] 97.4 °F (36.3 °C)  Pulse:  [61-71] 61  Resp:  [16] 16  BP: (162)/(77) 162/77   Gen: NAD  Cards: Pulse 67  Pul:diminished breath sounds  Abdomen soft   Ext: edema   Skin:pale   Asterixis negative   LABS   Lab Results   Component Value Date    WBC 5.78 01/03/2017    HGB 8.2 (L) 01/03/2017    HCT 26.3 (L) 01/03/2017    MCV 94 01/03/2017     01/03/2017          Recent Labs  Lab 01/03/17  1651   *   CALCIUM 7.8*   ALBUMIN 2.7*   PROT 6.2      K 6.0*   CO2 25      BUN 80*   CREATININE 4.1*   ALKPHOS 124   ALT 25   AST 16   BILITOT 0.4      Lab Results   Component Value Date    CALCIUM 7.8 (L) 01/03/2017     PHOS 2.9 11/15/2016      Lab Results   Component Value Date    IRON <10 (L) 11/16/2016    TIBC 155 (L) 11/16/2016    FERRITIN 546 (H) 11/16/2016        ABG  No results for input(s): PH, PO2, PCO2, HCO3, BE in the last 168 hours.      IMAGING:  CXR reviewed    ASSESSMENT / PLAN  Acute kidney injury on Chronic kidney disease 4/A3.  Diabetes Mellitus 15 years.  Diabetic Retinopathy.  Hypertension.  Creatinine 2.9 - 3.4 - 2016.  US 11/16/16-  RIGHT KIDNEY:  Normal in size.  Measures 12.1 cm.  There is decreased corticomedullary differentiation and normal cortical thickness.  There are no solid renal masses, nephrolithiasis, or hydronephrosis. A simple cyst is identified in the inferior pole of the right kidney, measuring 3.2 x 2.9 x 3.1 cm. Perfusion is decreased. Resistive index is elevated, measuring 0.76.     LEFT KIDNEY:  Normal in size.  Measures 11.2 cm.  There is decreased corticomedullary differentiation and normal cortical thickness.  There are no solid renal masses, nephrolithiasis, or hydronephrosis. Perfusion is decreased. Resistive index is elevated, measuring 0.82.     BLADDER:  Unremarkable.    The prostate gland measures 4.0.  UA protein 3+ (11/15/16).  Creatinine 4.1 today.  Azotemia. BUN 80.  Hyperkalemia. K 6. Treated with medications.  Anemia. Hb 8.2. Low iron.  Poor nutrition. Albumin 2.7.  Blood pressure 162/77.  CXR-  There is a loop recorder present.  The trachea is unremarkable.  There is enlargement of the cardiomediastinal silhouette.  There is obscuration of both hemidiaphragms.  There are stable bilateral pleural effusions with left greater than right.  There is no evidence of a pneumothorax.  There is stable pulmonary edema.  Bibasilar air space opacities are present.  The osseous structures are within normal limits.  Echo-11/16/18.  1 - Upper limit of normal left ventricular enlargement.    2 - Moderately depressed left ventricular systolic function (EF 35 % or slightly higher).    3 -  Normal right ventricular systolic function .    4 - Mild left atrial enlargement.     5 - Trivial to mild tricuspid regurgitation.     6 - The estimated PA systolic pressure is 29 mmHg.   Recheck potassium. Will follow up.  Start bumex drip 1 mg/h.  Zaroxolyn 10 mg po now.  Discussed about dialysis with patient.  Explained benefits and risk of hypotension, bleeding,   arrhythmia, death during dialysis.  Weight daily.  I and O.  Avoid nephrotoxic agents, hypotension.  Renal, ADA  diet.

## 2017-01-04 NOTE — PROGRESS NOTES
"[facility] Resident HO-I Progress Note    Subjective:      Patient states he feels much better this AM. Denies SOB, chest pain, dizziness, abdominal pain.     Objective:   Last 24 Hour Vital Signs:  BP  Min: 162/72  Max: 170/79  Temp  Av.8 °F (36.6 °C)  Min: 97.4 °F (36.3 °C)  Max: 98.4 °F (36.9 °C)  Pulse  Av.7  Min: 61  Max: 88  Resp  Av.8  Min: 16  Max: 24  SpO2  Av.3 %  Min: 90 %  Max: 99 %  Height  Av' 3" (160 cm)  Min: 5' 3" (160 cm)  Max: 5' 3" (160 cm)  Weight  Av.4 kg (216 lb 14.3 oz)  Min: 94.3 kg (208 lb)  Max: 101.3 kg (223 lb 5.2 oz)       Physical Examination:  Last 24 Hour Vital Signs:  BP  Min: 162/72  Max: 170/79  Temp  Av.8 °F (36.6 °C)  Min: 97.4 °F (36.3 °C)  Max: 98.4 °F (36.9 °C)  Pulse  Av.7  Min: 61  Max: 88  Resp  Av.8  Min: 16  Max: 24  SpO2  Av.3 %  Min: 90 %  Max: 99 %  Height  Av' 3" (160 cm)  Min: 5' 3" (160 cm)  Max: 5' 3" (160 cm)  Weight  Av.4 kg (216 lb 14.3 oz)  Min: 94.3 kg (208 lb)  Max: 101.3 kg (223 lb 5.2 oz)  Body mass index is 38.86 kg/(m^2).       General: Alert and awake in no apparent distress  Head:  Normocephalic and atraumatic  Eyes:  PERRL; EOMi with anicteric sclera and clear conjunctivae  Mouth:  Oropharynx clear and without exudate; moist mucous membranes  Cardio:  Regular rate and rhythm with normal S1 and S2; no murmurs or rubs  Resp:  CTAB; respirations unlabored; no wheezes, crackles or rhonchi  Abdom: Soft, NTND with normoactive bowel sounds  Extrem: Warm and well-perfused with no clubbing, 4+ pitting edema to thighs and on hands bilatteraly  Skin:  No rashes, lesions, or color changes  Pulses: 2+ and symmetric distally  Neuro:  AAOx3; cooperative and pleasant with no focal deficits    Laboratory:  Laboratory Data  Pertinent Findings:  Recent Labs      17   1651   WBC  5.78   HGB  8.2*   HCT  26.3*   PLT  273   MCV  94   RDW  16.3*   GRAN  81.0*  4.7   LYMPH  8.1*  0.5*   MONO  7.3  " 0.4   EOS  0.2   BASO  0.03   EOSINOPHIL  2.9   BASOPHIL  0.5       Recent Labs      01/03/17   1651  01/03/17 2008 01/03/17   2341   NA  136  138  138   K  6.0*  5.7*  5.8*   CL  106  107  107   CO2  25  21*  22*   BUN  80*  77*  78*   CREATININE  4.1*  4.0*  3.8*     Recent Labs      01/03/17   1641  01/03/17   2332   POCTGLUCOSE  307*  124*     Recent Labs      01/03/17   1651   PROT  6.2   ALBUMIN  2.7*   BILITOT  0.4   AST  16   ALT  25   ALKPHOS  124       Microbiology Data  Pertinent Findings:  No new data    Other Results:  EKG (my interpretation): No new EKG    Radiology Data   Pertinent Findings (my interpretation):  No new imaging    Current Medications:     Infusions:   bumetanide (BUMEX) infusion 1 mg/hr (01/03/17 2015)        Scheduled:   albuterol sulfate  5 mg Nebulization Q6H WAKE    amlodipine  10 mg Oral QHS    calcium gluconate IVPB  1,000 mg Intravenous Once    citalopram  20 mg Oral Daily    cloNIDine 0.1 mg/24 hr td ptwk  1 patch Transdermal Q7 Days    ferrous sulfate  325 mg Oral Daily    hydrALAZINE  50 mg Oral TID    insulin detemir  12 Units Subcutaneous QHS    rosuvastatin  40 mg Oral Daily    sodium chloride 0.9%  3 mL Intravenous Q8H    trazodone  50 mg Oral QHS        PRN:  flu vac [id number] 36mos up(PF), pneumoc 13-eugenio conj-dip cr(PF)    Antibiotics and Day Number of Therapy:  none    Lines and Day Number of Therapy:  PIV 22G x1, 20G x1    Assessment:     Ambrocio Chin is a 66 y.o.male with  Patient Active Problem List    Diagnosis Date Noted    Congestive heart failure 01/03/2017    Hyperkalemia 01/03/2017    Long term (current) use of anticoagulants 12/05/2016    Thrombus 12/05/2016    Aspiration pneumonia of left lower lobe 11/18/2016    Left-sided cerebrovascular accident (CVA) 11/18/2016    Benign essential HTN 11/18/2016    Fever 11/17/2016    Oropharyngeal dysphagia 11/17/2016    Iron deficiency anemia 11/16/2016    Left pontine stroke 11/16/2016     Stenosis of left carotid artery     Hypertension 11/15/2016    Type 2 diabetes mellitus with hyperglycemia, with long-term current use of insulin 11/15/2016    Hyperlipidemia 11/15/2016    CKD stage 3 due to type 2 diabetes mellitus 11/15/2016    JANEL (acute kidney injury) 11/15/2016    PVD (peripheral vascular disease) with claudication 11/15/2016    Acute on chronic systolic heart failure 11/15/2016    Chronic pulmonary edema 11/15/2016        Plan:     65yo Latvian speaking M w/ PMH HFrEF, L pontine infarct on 11/15/16 s/p inpatient rehab discharged 12/2/16, LV thrombus on warfarin, HTN, DM2, HLD, CKD4, diabetic retinopathy with complaint of SOB x3 days.     Fluid Overload in setting of HFrEF and CKD4  - Patient grossly fluid overloaded. SOB x 3 days, 10lb weight gain over past 2 weeks  - 4+ pitting edema to thighs and on hands bilaterally, JVP 15cm  - 11/16/16 TTE: LV EF 35%  - 1/3/16 CXR: cardiomegaly and pulmonary vascular congestion along with pulmonary edema and pleural effusions consistent w/ CHF  - At admission, BNP 1670  - Troponin x2 and EKG neg for ischemia  - Hold home Coreg in setting acute HFrEF exacerbation. Hold home lisinopril in setting of JANEL  - Metolazone 10mg PO x1 in ED  - Continue Bumex GTT as per Nephrology recommendations, monitor I&O and daily weights  - Currently, Net I&O -0.9L since admission  - Consult Nephrology, discuss possible dialysis in AM     JANEL on CKD4  - Likely secondary to cardio-renal syndrome due to volume overload secondary to HFrEF  - At admission, Cr 4.1 (baseline Cr 2.6-2.9).   - Hold home lisinopril in setting of JANEL  - 1/3/16 Renal US: Bilateral echogenic kidneys, compatible with medical renal disease. Increased resistive index involving the right kidney.  - Protein:Cr Ratio: 2.75, FeNa = 3.6% (likely intrinsic renal cause)  - F/u urine labs  - Nephrology consulted, will consider dialysis if no significant improvement made in fluid status or hyperkalemia in  AM  - Continue diuresis w/ Bumex GTT, consider dialysis    Hyperkalemia  - On admission, K 6.0 (previously 12/16/16 K 5.7)  - No EKG changes, no peaked T-waves, no ST-changes, no widening of QRS  - Ca Gluconate, Albuterol Neb, Kayexalate given in ED  - Currently, K 5.8  - Continue on Bumex GTT, f/u BMP's, consider dialysis    LV Thrombus  - 11/17/16 TTE: small round apical thrombus attached to the lateral part of the apex.  - Patient previously started on Warfarin, concern for medication non-compliance  - At admission, sub-theraputic INR 1.1  - Hold Coumadin for now for possible dialysis line placement in AM     Hx L pontine CVA  - 11/15/16 admitted for L pontine infarct, likely source was LV Thrombus  - S/p inpatient rehab discharged 12/2/16  - Stable mild right sided deficits, mild dysarthria, no acute issues  - Continue home aspirin, plavix, hold coumadin for now for possible line placement     Palpitiaions  - S/p loop Recorder implanted 11/17/16  - Currently no acute issues, will monitor on telemetry  - F/u outpatient     HTN  - On admission, /77  - Continue on home amlodipine, clonidine 0.1mg/24hr patch, hydralazine tid,   - Hold home coreg due to acute HFrEF decompensation      Normocytic Anemia  - At admission H/H 8.2/26.3 MCV 94  - Likely secondary to iron deficiency and CKD  - 11/16/16 Iron <10, Ferritin 546  - F/u B12 and Folate  - Continue home ferrous sulfate/colace     DM2 w/ neuropathy, nephropathy, retinopathy  - 11/15/16 A1C 10.5  - Continue home dose insulin Detemir 12U + SSI, monitor glucose POCT, will add/titrate insulin as needed     HLD  - 11/15/16 Lipid Panel: TChol 183, , HDL 27  - Continue home rosuvastatin     Anxiety/Depression  - Stable, denies SI/HI  - Continue home citalopram and trazodone qHS     DVT PPx: SCDs for now (may restart anti-coagulation pending possible dialysis line placement)  Diet: Renal  Code: Full     Disposition: pending clinical improvement    Justyn  Deion  [facility]  [facility] Internal Medicine Service Team B    hospitals Medicine Hospitalist Pager numbers:   hospitals Hospitalist Medicine Team A (Patsy/Nando): [phone]  [facility]ospitals Hospitalist Medicine Team B (Lukas/Martha):  [phone]

## 2017-01-05 LAB
ANION GAP SERPL CALC-SCNC: 7 MMOL/L
BASOPHILS # BLD AUTO: 0.03 K/UL
BASOPHILS NFR BLD: 0.6 %
BUN SERPL-MCNC: 69 MG/DL
CALCIUM SERPL-MCNC: 8.2 MG/DL
CHLORIDE SERPL-SCNC: 104 MMOL/L
CO2 SERPL-SCNC: 26 MMOL/L
CREAT SERPL-MCNC: 3.8 MG/DL
DIASTOLIC DYSFUNCTION: YES
DIFFERENTIAL METHOD: ABNORMAL
EOSINOPHIL # BLD AUTO: 0.2 K/UL
EOSINOPHIL NFR BLD: 3.6 %
ERYTHROCYTE [DISTWIDTH] IN BLOOD BY AUTOMATED COUNT: 16.5 %
EST. GFR  (AFRICAN AMERICAN): 18 ML/MIN/1.73 M^2
EST. GFR  (NON AFRICAN AMERICAN): 16 ML/MIN/1.73 M^2
ESTIMATED PA SYSTOLIC PRESSURE: 48.43
GLUCOSE SERPL-MCNC: 45 MG/DL
GLUCOSE SERPL-MCNC: 56 MG/DL
HAV IGM SERPL QL IA: NEGATIVE
HBV CORE IGM SERPL QL IA: NEGATIVE
HBV SURFACE AG SERPL QL IA: NEGATIVE
HCT VFR BLD AUTO: 24.7 %
HCV AB SERPL QL IA: NEGATIVE
HGB BLD-MCNC: 8 G/DL
INR PPP: 1.2
KAPPA LC SER QL IA: 14.67 MG/DL
KAPPA LC/LAMBDA SER IA: 1.6
LAMBDA LC SER QL IA: 9.18 MG/DL
LYMPHOCYTES # BLD AUTO: 0.5 K/UL
LYMPHOCYTES NFR BLD: 9.9 %
MCH RBC QN AUTO: 29.7 PG
MCHC RBC AUTO-ENTMCNC: 32.4 %
MCV RBC AUTO: 92 FL
MICROALBUMIN UG/MIN 24H UR-MRATE: 850 UG/MIN
MICROALBUMIN,MG/SPEC: 1224 MG/SPEC
MONOCYTES # BLD AUTO: 0.6 K/UL
MONOCYTES NFR BLD: 10.7 %
NEUTROPHILS # BLD AUTO: 3.9 K/UL
NEUTROPHILS NFR BLD: 75 %
PLATELET # BLD AUTO: 278 K/UL
PMV BLD AUTO: 8.5 FL
POCT GLUCOSE: 142 MG/DL (ref 70–110)
POCT GLUCOSE: 220 MG/DL (ref 70–110)
POCT GLUCOSE: 346 MG/DL (ref 70–110)
POCT GLUCOSE: 41 MG/DL (ref 70–110)
POCT GLUCOSE: 96 MG/DL (ref 70–110)
POTASSIUM SERPL-SCNC: 4.3 MMOL/L
PROT PATTERN UR ELPH-IMP: NORMAL
PROTHROMBIN TIME: 12.4 SEC
RBC # BLD AUTO: 2.69 M/UL
RETIRED EF AND QEF - SEE NOTES: 65 (ref 55–65)
SODIUM SERPL-SCNC: 137 MMOL/L
TRICUSPID VALVE REGURGITATION: ABNORMAL
URINE COLLECTION DURATION: 24 HR
URINE MICROALBUMIN CONCENTRATION: 272 UG/ML
URINE VOLUME: 4500 ML
WBC # BLD AUTO: 5.23 K/UL

## 2017-01-05 PROCEDURE — 85610 PROTHROMBIN TIME: CPT

## 2017-01-05 PROCEDURE — 80048 BASIC METABOLIC PNL TOTAL CA: CPT

## 2017-01-05 PROCEDURE — 36415 COLL VENOUS BLD VENIPUNCTURE: CPT

## 2017-01-05 PROCEDURE — 82947 ASSAY GLUCOSE BLOOD QUANT: CPT

## 2017-01-05 PROCEDURE — 85025 COMPLETE CBC W/AUTO DIFF WBC: CPT

## 2017-01-05 PROCEDURE — 11000001 HC ACUTE MED/SURG PRIVATE ROOM

## 2017-01-05 PROCEDURE — 25000242 PHARM REV CODE 250 ALT 637 W/ HCPCS: Performed by: INTERNAL MEDICINE

## 2017-01-05 PROCEDURE — 80074 ACUTE HEPATITIS PANEL: CPT

## 2017-01-05 PROCEDURE — 25000003 PHARM REV CODE 250: Performed by: INTERNAL MEDICINE

## 2017-01-05 PROCEDURE — 93306 TTE W/DOPPLER COMPLETE: CPT

## 2017-01-05 PROCEDURE — 63600175 PHARM REV CODE 636 W HCPCS: Performed by: INTERNAL MEDICINE

## 2017-01-05 PROCEDURE — 25000003 PHARM REV CODE 250: Performed by: STUDENT IN AN ORGANIZED HEALTH CARE EDUCATION/TRAINING PROGRAM

## 2017-01-05 PROCEDURE — 94640 AIRWAY INHALATION TREATMENT: CPT

## 2017-01-05 RX ORDER — POTASSIUM CHLORIDE 20 MEQ/15ML
10 SOLUTION ORAL ONCE
Status: COMPLETED | OUTPATIENT
Start: 2017-01-05 | End: 2017-01-05

## 2017-01-05 RX ADMIN — TRAZODONE HYDROCHLORIDE 50 MG: 50 TABLET ORAL at 08:01

## 2017-01-05 RX ADMIN — SODIUM CHLORIDE, PRESERVATIVE FREE 3 ML: 5 INJECTION INTRAVENOUS at 02:01

## 2017-01-05 RX ADMIN — POTASSIUM CHLORIDE 10 MEQ: 20 SOLUTION ORAL at 02:01

## 2017-01-05 RX ADMIN — AMLODIPINE BESYLATE 10 MG: 5 TABLET ORAL at 08:01

## 2017-01-05 RX ADMIN — ALBUTEROL SULFATE 2.5 MG: 2.5 SOLUTION RESPIRATORY (INHALATION) at 08:01

## 2017-01-05 RX ADMIN — CITALOPRAM HYDROBROMIDE 20 MG: 20 TABLET ORAL at 09:01

## 2017-01-05 RX ADMIN — BUMETANIDE 1 MG/HR: 0.25 INJECTION INTRAMUSCULAR; INTRAVENOUS at 04:01

## 2017-01-05 RX ADMIN — ERYTHROPOIETIN 20000 UNITS: 20000 INJECTION, SOLUTION INTRAVENOUS; SUBCUTANEOUS at 11:01

## 2017-01-05 RX ADMIN — HYDRALAZINE HYDROCHLORIDE 50 MG: 25 TABLET ORAL at 10:01

## 2017-01-05 RX ADMIN — HYDRALAZINE HYDROCHLORIDE 50 MG: 25 TABLET ORAL at 02:01

## 2017-01-05 RX ADMIN — METOLAZONE 10 MG: 5 TABLET ORAL at 09:01

## 2017-01-05 RX ADMIN — HYDRALAZINE HYDROCHLORIDE 50 MG: 25 TABLET ORAL at 05:01

## 2017-01-05 RX ADMIN — ASPIRIN 81 MG: 81 TABLET, COATED ORAL at 09:01

## 2017-01-05 RX ADMIN — SODIUM CHLORIDE, PRESERVATIVE FREE 3 ML: 5 INJECTION INTRAVENOUS at 05:01

## 2017-01-05 RX ADMIN — FERROUS SULFATE TAB EC 325 MG (65 MG FE EQUIVALENT) 325 MG: 325 (65 FE) TABLET DELAYED RESPONSE at 09:01

## 2017-01-05 RX ADMIN — SODIUM CHLORIDE, PRESERVATIVE FREE 3 ML: 5 INJECTION INTRAVENOUS at 10:01

## 2017-01-05 RX ADMIN — ROSUVASTATIN CALCIUM 40 MG: 10 TABLET, FILM COATED ORAL at 11:01

## 2017-01-05 RX ADMIN — Medication 1 CAPSULE: at 09:01

## 2017-01-05 RX ADMIN — BUMETANIDE 1 MG/HR: 0.25 INJECTION INTRAMUSCULAR; INTRAVENOUS at 02:01

## 2017-01-05 NOTE — PLAN OF CARE
Problem: Patient Care Overview  Goal: Plan of Care Review  Outcome: Ongoing (interventions implemented as appropriate)  Plan of care reviewed with patient. Patient verbalized complete understanding. Fall precautions maintained. Bed in lowest position, locked, call light within reach and bed alarm set to level 1. Pt aware . Side rails up x's 2 with slip resistant socks on. Nurse instructed patient to notify staff for any assistance and pt verbalized complete understanding.      Patient on telemetry throughout shift with no ectopy noted. Will continue to monitor.

## 2017-01-05 NOTE — PLAN OF CARE
MD team notified Dr. Sutton out this week, MD team stated they will discuss who to consult next. Nurse awaiting orders.

## 2017-01-05 NOTE — PLAN OF CARE
Telemetry Recap:    Patient stable, NSR on heart monitor, no abnormal heart rhythms noted, no red alarms on monitor, will continue to monitor.

## 2017-01-05 NOTE — PLAN OF CARE
Problem: Patient Care Overview  Goal: Plan of Care Review  Outcome: Ongoing (interventions implemented as appropriate)  Pt on room air in no apparent distress.  Breathing tx. Given with good pt. Effort.  Will cont. To monitor.

## 2017-01-05 NOTE — PLAN OF CARE
Patient's blood sugar was 40 on the glucometer. Blood glucose order put in stat. Came back 45.  Gave patient 4 apple juices and gram crackers, glucose checked again on the glucometer 30 minutes later was, 96. Patient was alao bathed and lined changed.

## 2017-01-05 NOTE — PROGRESS NOTES
[facility] Medicine Resident [facility]-III Progress Note    Subjective:      Patient feeling even better today. States he has been urinating about every half hour. States swelling is improving.     Objective:   Last 24 Hour Vital Signs:  BP  Min: 115/63  Max: 145/69  Temp  Av.1 °F (36.7 °C)  Min: 97.8 °F (36.6 °C)  Max: 98.5 °F (36.9 °C)  Pulse  Av.7  Min: 58  Max: 84  Resp  Av.8  Min: 16  Max: 20  SpO2  Av.8 %  Min: 92 %  Max: 97 %  I/O last 3 completed shifts:  In: 2197.3 [P.O.:1800; I.V.:297.3; IV Piggyback:100]  Out: 6050 [Urine:6050]    Physical Examination:  Last 24 Hour Vital Signs:  BP  Min: 115/63  Max: 145/69  Temp  Av.1 °F (36.7 °C)  Min: 97.8 °F (36.6 °C)  Max: 98.5 °F (36.9 °C)  Pulse  Av.7  Min: 58  Max: 84  Resp  Av.8  Min: 16  Max: 20  SpO2  Av.8 %  Min: 92 %  Max: 97 %  Body mass index is 38.86 kg/(m^2).  I/O last 3 completed shifts:  In: 2197.3 [P.O.:1800; I.V.:297.3; IV Piggyback:100]  Out: 6050 [Urine:6050]    General: Alert and awake in no apparent distress  Head:  Normocephalic and atraumatic  Eyes:  PERRL; EOMi with anicteric sclera and clear conjunctivae  Mouth:  Oropharynx clear and without exudate; moist mucous membranes  Cardio:  Regular rate and rhythm with normal S1 and S2; no murmurs or rubs  Resp:  CTAB; respirations unlabored; no wheezes, crackles or rhonchi  Abdom: Soft, NTND with normoactive bowel sounds  Extrem: Warm and well-perfused with no clubbing, 4+ pitting edema to thighs and on hands bilatteraly  Skin:  No rashes, lesions, or color changes  Pulses: 2+ and symmetric distally  Neuro:  AAOx3; cooperative and pleasant with no focal deficits    Laboratory:  Laboratory Data  Pertinent Findings:  Recent Labs      17   1651  17   0615  17   0337   WBC  5.78  5.43  5.71  5.23   HGB  8.2*  8.8*  8.7*  8.0*   HCT  26.3*  27.7*  27.7*  24.7*   PLT  273  309  307  278   MCV  94  92  92  92   RDW  16.3*  16.4*  16.4*  16.5*    GRAN  81.0*  4.7  75.0*  74.6*  4.1  4.3  75.0*  3.9   LYMPH  8.1*  0.5*  13.6*  12.4*  0.7*  0.7*  9.9*  0.5*   MONO  7.3  0.4  6.8  8.8  0.4  0.5  10.7  0.6   EOS  0.2  0.2  0.2  0.2   BASO  0.03  0.04  0.03  0.03   EOSINOPHIL  2.9  3.7  3.5  3.6   BASOPHIL  0.5  0.7  0.5  0.6       Recent Labs      01/03/17   1651  01/03/17 2008 01/03/17   2341  01/04/17   0600  01/04/17   0615  01/05/17   0337   NA  136  138  138   --   139  137   K  6.0*  5.7*  5.8*   --   5.1  4.3   CL  106  107  107   --   107  104   CO2  25  21*  22*   --   25  26   BUN  80*  77*  78*   --   76*  69*   CREATININE  4.1*  4.0*  3.8*  3.8*  3.8*  3.8*     Recent Labs      01/04/17   1045  01/04/17   1607  01/04/17 2010 01/05/17   0552  01/05/17   0641   POCTGLUCOSE  125*  199*  187*  41*  96     Recent Labs      01/03/17   1651   PROT  6.2   ALBUMIN  2.7*   BILITOT  0.4   AST  16   ALT  25   ALKPHOS  124       Microbiology Data  Pertinent Findings:  No new data    Other Results:  EKG (my interpretation): No new EKG    Radiology Data   Pertinent Findings (my interpretation):  No new imaging    Current Medications:     Infusions:   bumetanide (BUMEX) infusion 1 mg/hr (01/05/17 0423)        Scheduled:   albuterol sulfate  2.5 mg Nebulization Q6H WAKE    amlodipine  10 mg Oral QHS    aspirin  81 mg Oral Daily    calcium gluconate IVPB  1,000 mg Intravenous Once    citalopram  20 mg Oral Daily    cloNIDine 0.1 mg/24 hr td ptwk  1 patch Transdermal Q7 Days    epoetin reynaldo (PROCRIT) injection  20,000 Units Subcutaneous Q7 Days    ferrous sulfate  325 mg Oral Daily    hydrALAZINE  50 mg Oral TID    insulin detemir  8 Units Subcutaneous QHS    iron sucrose (VENOFER) IVPB  100 mg Intravenous Every Mon, Wed, Fri    metOLazone  10 mg Oral Daily    rosuvastatin  40 mg Oral Daily    sodium chloride 0.9%  3 mL Intravenous Q8H    trazodone  50 mg Oral QHS    vitamin renal formula (B-complex-vitamin c-folic acid)  1  capsule Oral Daily        PRN:  flu vac [id number] 36mos up(PF), pneumoc 13-eugenio conj-dip cr(PF)    Antibiotics and Day Number of Therapy:  none    Lines and Day Number of Therapy:  PIV 22G x1, 20G x1    Assessment:     Ambrocio Chin is a 66 y.o.male with  Patient Active Problem List    Diagnosis Date Noted    Congestive heart failure 01/03/2017    Hyperkalemia 01/03/2017    Long term (current) use of anticoagulants 12/05/2016    Thrombus 12/05/2016    Aspiration pneumonia of left lower lobe 11/18/2016    Left-sided cerebrovascular accident (CVA) 11/18/2016    Benign essential HTN 11/18/2016    Fever 11/17/2016    Oropharyngeal dysphagia 11/17/2016    Iron deficiency anemia 11/16/2016    Left pontine stroke 11/16/2016    Stenosis of left carotid artery     Hypertension 11/15/2016    Type 2 diabetes mellitus with hyperglycemia, with long-term current use of insulin 11/15/2016    Hyperlipidemia 11/15/2016    CKD stage 3 due to type 2 diabetes mellitus 11/15/2016    JANEL (acute kidney injury) 11/15/2016    PVD (peripheral vascular disease) with claudication 11/15/2016    Acute on chronic systolic heart failure 11/15/2016    Chronic pulmonary edema 11/15/2016        Plan:     67yo Nepalese speaking M w/ PMH HFrEF, L pontine infarct on 11/15/16 s/p inpatient rehab discharged 12/2/16, LV thrombus on warfarin, HTN, DM2, HLD, CKD4, diabetic retinopathy with complaint of SOB x3 days.     Fluid Overload in setting of HFrEF and CKD4  - Patient grossly fluid overloaded. SOB x 3 days, 10lb weight gain over past 2 weeks  - 4+ pitting edema to thighs and on hands bilaterally, JVP 15cm  - 11/16/16 TTE: LV EF 35%  - 1/3/16 CXR: cardiomegaly and pulmonary vascular congestion along with pulmonary edema and pleural effusions consistent w/ CHF  - At admission, BNP 1670  - Troponin x2 and EKG neg for ischemia  - Hold home Coreg in setting acute HFrEF exacerbation. Hold home lisinopril in setting of JANEL  - Metolazone 10mg  PO x1 in ED  - Continue Bumex GTT as per Nephrology recommendations, monitor I&O and daily weights  - Currently, Net I&O -4L since admission  - nephrology on board, holding off on dialysis for now     JANEL on CKD4  - Likely secondary to cardio-renal syndrome due to volume overload secondary to HFrEF  - At admission, Cr 4.1 (baseline Cr 2.6-2.9). Cr today 3.8   - Hold home lisinopril in setting of JANEL  - 1/3/16 Renal US: Bilateral echogenic kidneys, compatible with medical renal disease. Increased resistive index involving the right kidney.  - Protein:Cr Ratio: 2.75, FeNa = 3.6% (likely intrinsic renal cause)  - F/u urine labs  - Nephrology consulted, holding dialysis for now  - Continue diuresis w/ Bumex GTT    Hyperkalemia  - On admission, K 6.0 (previously 12/16/16 K 5.7)  - No EKG changes, no peaked T-waves, no ST-changes, no widening of QRS  - Ca Gluconate, Albuterol Neb, Kayexalate given in ED  - Currently, K 4.3  - Continue on Bumex GTT    LV Thrombus  - 11/17/16 TTE: small round apical thrombus attached to the lateral part of the apex.  - Patient previously started on Warfarin, concern for medication non-compliance  - At admission, sub-theraputic INR 1.1  - holding coumadin for now in case dialysis access needed  - will get echo today to check for resolution of thrombus     Hx L pontine CVA  - 11/15/16 admitted for L pontine infarct, likely source was LV Thrombus  - S/p inpatient rehab discharged 12/2/16  - Stable mild right sided deficits, mild dysarthria, no acute issues  - Continue home aspirin, plavix, hold coumadin for now for possible line placement     Palpitiaions  - S/p loop Recorder implanted 11/17/16  - Currently no acute issues, will monitor on telemetry  - F/u outpatient     HTN  - On admission, /77 - now improved  - Continue on home amlodipine, clonidine 0.1mg/24hr patch, hydralazine TID   - Hold home coreg due to acute HFrEF decompensation      Normocytic Anemia  - At admission H/H  8.2/26.3 MCV 94  - Likely secondary to iron deficiency and CKD  - 11/16/16 Iron <10, Ferritin 546  - F/u B12 and Folate  - Continue home ferrous sulfate/colace     DM2 w/ neuropathy, nephropathy, retinopathy  - 11/15/16 A1C 10.5  - home dose insulin Detemir 12U + SSI  - glucose low this AM, decrease to 8U nightly while in house     HLD  - 11/15/16 Lipid Panel: TChol 183, , HDL 27  - Continue home rosuvastatin     Anxiety/Depression  - Stable, denies SI/HI  - Continue home citalopram and trazodone qHS     DVT PPx: SCDs for now (may restart anti-coagulation pending possible dialysis line placement)  Diet: Renal  Code: Full     Disposition: pending clinical improvement    Demetris Govea  [facility] Internal Medicine HO-III  U Internal Medicine Service Team B    [facility] Medicine Hospitalist Pager numbers:   U Hospitalist Medicine Team A (Patsy/Nando): [phone]  [facility] Hospitalist Medicine Team B (Lukas/Martha):  [phone]

## 2017-01-05 NOTE — PLAN OF CARE
Problem: Patient Care Overview  Goal: Plan of Care Review  Outcome: Ongoing (interventions implemented as appropriate)  Patient stable, plan of care reviewed, verbalized understanding. Patient's Bumex drip still in progress at 10 cc/hr. Education also given to patient on importance of strict intake and output, verbalized understanding. Patient had 2D echo completed today, no reports of pain, no acute distress noted. Patient's bed low and locked, urinal at bedside, will continue to monitor.

## 2017-01-06 LAB
ANION GAP SERPL CALC-SCNC: 7 MMOL/L
BASOPHILS # BLD AUTO: 0.02 K/UL
BASOPHILS NFR BLD: 0.4 %
BUN SERPL-MCNC: 66 MG/DL
CALCIUM SERPL-MCNC: 8.1 MG/DL
CHLORIDE SERPL-SCNC: 99 MMOL/L
CO2 SERPL-SCNC: 29 MMOL/L
CREAT SERPL-MCNC: 3.8 MG/DL
DIFFERENTIAL METHOD: ABNORMAL
EOSINOPHIL # BLD AUTO: 0.2 K/UL
EOSINOPHIL NFR BLD: 3.8 %
ERYTHROCYTE [DISTWIDTH] IN BLOOD BY AUTOMATED COUNT: 16 %
EST. GFR  (AFRICAN AMERICAN): 18 ML/MIN/1.73 M^2
EST. GFR  (NON AFRICAN AMERICAN): 16 ML/MIN/1.73 M^2
GLUCOSE SERPL-MCNC: 144 MG/DL
HCT VFR BLD AUTO: 26.3 %
HGB BLD-MCNC: 8.4 G/DL
INR PPP: 1.1
INTERPRETATION UR IFE-IMP: NORMAL
LYMPHOCYTES # BLD AUTO: 0.4 K/UL
LYMPHOCYTES NFR BLD: 7.9 %
MAGNESIUM SERPL-MCNC: 2.4 MG/DL
MCH RBC QN AUTO: 29.1 PG
MCHC RBC AUTO-ENTMCNC: 31.9 %
MCV RBC AUTO: 91 FL
MONOCYTES # BLD AUTO: 0.5 K/UL
MONOCYTES NFR BLD: 9.2 %
NEUTROPHILS # BLD AUTO: 4.4 K/UL
NEUTROPHILS NFR BLD: 78.5 %
PATHOLOGIST INTERPRETATION UIFE: NORMAL
PATHOLOGIST INTERPRETATION UPE: NORMAL
PHOSPHATE SERPL-MCNC: 5.8 MG/DL
PLATELET # BLD AUTO: 278 K/UL
PMV BLD AUTO: 8.5 FL
POCT GLUCOSE: 128 MG/DL (ref 70–110)
POCT GLUCOSE: 163 MG/DL (ref 70–110)
POCT GLUCOSE: 211 MG/DL (ref 70–110)
POCT GLUCOSE: 377 MG/DL (ref 70–110)
POTASSIUM SERPL-SCNC: 4.3 MMOL/L
PROTHROMBIN TIME: 11.7 SEC
RBC # BLD AUTO: 2.89 M/UL
SODIUM SERPL-SCNC: 135 MMOL/L
WBC # BLD AUTO: 5.54 K/UL

## 2017-01-06 PROCEDURE — 25000003 PHARM REV CODE 250: Performed by: INTERNAL MEDICINE

## 2017-01-06 PROCEDURE — G8979 MOBILITY GOAL STATUS: HCPCS | Mod: CI

## 2017-01-06 PROCEDURE — 83735 ASSAY OF MAGNESIUM: CPT

## 2017-01-06 PROCEDURE — G8988 SELF CARE GOAL STATUS: HCPCS | Mod: CI

## 2017-01-06 PROCEDURE — 85610 PROTHROMBIN TIME: CPT

## 2017-01-06 PROCEDURE — G8987 SELF CARE CURRENT STATUS: HCPCS | Mod: CI

## 2017-01-06 PROCEDURE — 94761 N-INVAS EAR/PLS OXIMETRY MLT: CPT

## 2017-01-06 PROCEDURE — G8978 MOBILITY CURRENT STATUS: HCPCS | Mod: CJ

## 2017-01-06 PROCEDURE — 97165 OT EVAL LOW COMPLEX 30 MIN: CPT

## 2017-01-06 PROCEDURE — 97161 PT EVAL LOW COMPLEX 20 MIN: CPT

## 2017-01-06 PROCEDURE — 11000001 HC ACUTE MED/SURG PRIVATE ROOM

## 2017-01-06 PROCEDURE — G8989 SELF CARE D/C STATUS: HCPCS | Mod: CI

## 2017-01-06 PROCEDURE — 63600175 PHARM REV CODE 636 W HCPCS: Performed by: INTERNAL MEDICINE

## 2017-01-06 PROCEDURE — 80048 BASIC METABOLIC PNL TOTAL CA: CPT

## 2017-01-06 PROCEDURE — 84100 ASSAY OF PHOSPHORUS: CPT

## 2017-01-06 PROCEDURE — 97116 GAIT TRAINING THERAPY: CPT

## 2017-01-06 PROCEDURE — 25000003 PHARM REV CODE 250: Performed by: STUDENT IN AN ORGANIZED HEALTH CARE EDUCATION/TRAINING PROGRAM

## 2017-01-06 PROCEDURE — 85025 COMPLETE CBC W/AUTO DIFF WBC: CPT

## 2017-01-06 RX ORDER — IBUPROFEN 200 MG
24 TABLET ORAL
Status: DISCONTINUED | OUTPATIENT
Start: 2017-01-06 | End: 2017-01-09 | Stop reason: HOSPADM

## 2017-01-06 RX ORDER — GLUCAGON 1 MG
1 KIT INJECTION
Status: DISCONTINUED | OUTPATIENT
Start: 2017-01-06 | End: 2017-01-09 | Stop reason: HOSPADM

## 2017-01-06 RX ORDER — CARVEDILOL 3.12 MG/1
3.12 TABLET ORAL 2 TIMES DAILY
Status: DISCONTINUED | OUTPATIENT
Start: 2017-01-06 | End: 2017-01-07

## 2017-01-06 RX ORDER — IBUPROFEN 200 MG
16 TABLET ORAL
Status: DISCONTINUED | OUTPATIENT
Start: 2017-01-06 | End: 2017-01-09 | Stop reason: HOSPADM

## 2017-01-06 RX ORDER — HEPARIN SODIUM 5000 [USP'U]/ML
5000 INJECTION, SOLUTION INTRAVENOUS; SUBCUTANEOUS EVERY 12 HOURS
Status: DISCONTINUED | OUTPATIENT
Start: 2017-01-06 | End: 2017-01-09 | Stop reason: HOSPADM

## 2017-01-06 RX ADMIN — HYDRALAZINE HYDROCHLORIDE 50 MG: 25 TABLET ORAL at 09:01

## 2017-01-06 RX ADMIN — BUMETANIDE 1 MG/HR: 0.25 INJECTION INTRAMUSCULAR; INTRAVENOUS at 12:01

## 2017-01-06 RX ADMIN — ASPIRIN 81 MG: 81 TABLET, COATED ORAL at 09:01

## 2017-01-06 RX ADMIN — HYDRALAZINE HYDROCHLORIDE 50 MG: 25 TABLET ORAL at 05:01

## 2017-01-06 RX ADMIN — ROSUVASTATIN CALCIUM 40 MG: 10 TABLET, FILM COATED ORAL at 09:01

## 2017-01-06 RX ADMIN — IRON SUCROSE 100 MG: 20 INJECTION, SOLUTION INTRAVENOUS at 09:01

## 2017-01-06 RX ADMIN — SODIUM CHLORIDE, PRESERVATIVE FREE 3 ML: 5 INJECTION INTRAVENOUS at 05:01

## 2017-01-06 RX ADMIN — CITALOPRAM HYDROBROMIDE 20 MG: 20 TABLET ORAL at 09:01

## 2017-01-06 RX ADMIN — HEPARIN SODIUM 5000 UNITS: 5000 INJECTION, SOLUTION INTRAVENOUS; SUBCUTANEOUS at 08:01

## 2017-01-06 RX ADMIN — AMLODIPINE BESYLATE 10 MG: 5 TABLET ORAL at 08:01

## 2017-01-06 RX ADMIN — SODIUM CHLORIDE, PRESERVATIVE FREE 3 ML: 5 INJECTION INTRAVENOUS at 09:01

## 2017-01-06 RX ADMIN — Medication 1 CAPSULE: at 09:01

## 2017-01-06 RX ADMIN — CARVEDILOL 3.12 MG: 3.12 TABLET, FILM COATED ORAL at 08:01

## 2017-01-06 RX ADMIN — TRAZODONE HYDROCHLORIDE 50 MG: 50 TABLET ORAL at 08:01

## 2017-01-06 RX ADMIN — SODIUM CHLORIDE, PRESERVATIVE FREE 3 ML: 5 INJECTION INTRAVENOUS at 02:01

## 2017-01-06 RX ADMIN — FERROUS SULFATE TAB EC 325 MG (65 MG FE EQUIVALENT) 325 MG: 325 (65 FE) TABLET DELAYED RESPONSE at 09:01

## 2017-01-06 RX ADMIN — BUMETANIDE 1 MG/HR: 0.25 INJECTION INTRAMUSCULAR; INTRAVENOUS at 07:01

## 2017-01-06 RX ADMIN — BUMETANIDE 1 MG/HR: 0.25 INJECTION INTRAMUSCULAR; INTRAVENOUS at 09:01

## 2017-01-06 RX ADMIN — METOLAZONE 10 MG: 5 TABLET ORAL at 09:01

## 2017-01-06 RX ADMIN — HEPARIN SODIUM 5000 UNITS: 5000 INJECTION, SOLUTION INTRAVENOUS; SUBCUTANEOUS at 09:01

## 2017-01-06 RX ADMIN — HYDRALAZINE HYDROCHLORIDE 50 MG: 25 TABLET ORAL at 02:01

## 2017-01-06 NOTE — PLAN OF CARE
Problem: Patient Care Overview  Goal: Plan of Care Review  Outcome: Ongoing (interventions implemented as appropriate)  Patient stable, plan of care reviewed, verbalized understanding. Patient still on bumex drip at this time, IV CDI, no reports of pain noted. Patient demonstrated understanding on use of urinal and strict intake and output. Patient sitting in recliner at this time, call bell in reach, will continue to monitor.

## 2017-01-06 NOTE — PT/OT/SLP EVAL
Occupational Therapy  Evaluation and D/c Summary     Ambrocio Chin   MRN: [id number]   Admitting Diagnosis: Acute on chronic systolic heart failure    OT Date of Treatment: 01/06/17   OT Start Time: 1011  OT Stop Time: 1027  OT Total Time (min): 16 min    Billable Minutes:  Evaluation 16    Diagnosis: Acute on chronic systolic heart failure   The primary encounter diagnosis was Congestive heart failure, unspecified congestive heart failure chronicity, unspecified congestive heart failure type. Diagnoses of Renal failure, Hyperkalemia, Acute on chronic systolic heart failure, Iron deficiency anemia, unspecified iron deficiency anemia type, and Chest pain were also pertinent to this visit.      Past Medical History   Diagnosis Date    CHF (congestive heart failure)     Diabetes mellitus     Hypertension     PVD (peripheral vascular disease)     Stroke      Lt pontine stroke 11/15/2016      Past Surgical History   Procedure Laterality Date    Toe amputation Left      5th toe         General Precautions: Standard, fall  Orthopedic Precautions: N/A  Braces:            Patient History:  Living Environment  Lives With: friend(s)  Living Arrangements:  (duplex)  Home Accessibility:  (tub/shower combo)  Living Environment Comment: Pt reports living with a friend in a SS duplex, no steps, tub/shower combo. Pt recently d/c from rehab facility last month. Reports mod I at home for ADLs and functional mobility with BSc over toilet and RW for ambulation. Friend assists with IADLs and transportation. Pt does not drive, or work ..  Equipment Currently Used at Home: bedside commode, walker, rolling    Prior level of function:   Bed Mobility/Transfers: needs device  Grooming: independent  Bathing: independent  Upper Body Dressing: independent  Lower Body Dressing: independent  Toileting: needs device  Home Management Skills: needs assist  Driving License: No  Mode of Transportation: Family, Friends     Dominant hand:  "right    Subjective:  Communicated with nsg prior to session.  Pt reports, " I feel really well."   Chief Complaint: none stated  Patient/Family stated goals: return home     Pain Ratin/10                   Objective:       Cognitive Exam:  Oriented to: Person, Place, Time and Situation  Follows Commands/attention: Follows multistep  commands  Communication: clear/fluent  Memory:  No Deficits noted  Safety awareness/insight to disability: intact  Coping skills/emotional control: Appropriate to situation    Visual/perceptual:  Intact    Physical Exam:  Postural examination/scapula alignment: Rounded shoulder  Skin integrity: Visible skin intact  Edema: None noted     Sensation:   Intact    Upper Extremity Range of Motion:  Right Upper Extremity: WFL  Left Upper Extremity: WFL    Upper Extremity Strength:  Right Upper Extremity: Deficits: slightly decreased shoulder strength, 4/5  Left Upper Extremity: WFL   Strength: good; R slightly weaker than L     Fine motor coordination:   Intact    Gross motor coordination: WFL    Functional Mobility:  Bed Mobility:  Scooting/Bridging: Supervision  Supine to Sit: Supervision    Transfers:  Sit <> Stand Assistance: Stand By Assistance  Sit <> Stand Assistive Device: Rolling Walker  Bed <> Chair Technique:  (ambulation around bed in room )  Bed <> Chair Transfer Assistance: Stand By Assistance  Bed <> Chair Assistive Device: Rolling Walker    Functional Ambulation: SBA-supervision with RW in room and hallway     Activities of Daily Living:    LE Dressing Level of Assistance: Supervision      Balance:   Static Sit: NORMAL: No deviations seen in posture held statically  Dynamic Sit: GOOD+: Maintains balance through MAXIMAL excursions of active trunk motion  Static Stand: FAIR+: Takes MINIMAL challenges from all directions  Dynamic stand: FAIR+: Needs CLOSE SUPERVISION during gait and is able to right self with minor LOB      AM-PAC 6 CLICK ADL  How much help from another " "person does this patient currently need?  1 = Unable, Total/Dependent Assistance  2 = A lot, Maximum/Moderate Assistance  3 = A little, Minimum/Contact Guard/Supervision  4 = None, Modified [place]/Independent    Putting on and taking off regular lower body clothing? : 4  Bathing (including washing, rinsing, drying)?: 3  Toileting, which includes using toilet, bedpan, or urinal? : 4  Putting on and taking off regular upper body clothing?: 4  Taking care of personal grooming such as brushing teeth?: 4  Eating meals?: 4  Total Score: 23    AM-PAC Raw Score CMS "G-Code Modifier Level of Impairment Assistance   6 % Total / Unable   7 - 9 CM 80 - 100% Maximal Assist   10 - 14 CL 60 - 80% Moderate Assist   15 - 19 CK 40 - 60% Moderate Assist   20 - 22 CJ 20 - 40% Minimal Assist   23 CI 1-20% SBA / CGA   24 CH 0% Independent/ Mod I       Patient left up in chair with all lines intact, call button in reach and nsg notified    Assessment:  Ambrocio Chin is a 66 y.o. male with a medical diagnosis of Acute on chronic systolic heart failure and presents with deconditioning. Pt performing SBA-supervision for ADLs and functional mobility. Pt reports no concerns with independence with ADLs at this time. Pt remains with mild R sided deficits from previous CVA. Recommending home with TTB when medically stable. D/c OT     Rehab identified problem list/impairments: Rehab identified problem list/impairments: impaired endurance, impaired balance, impaired cardiopulmonary response to activity    Rehab potential is good.    Activity tolerance: Good    Discharge recommendations: Discharge Facility/Level Of Care Needs: home     Barriers to discharge: Barriers to Discharge: None    Equipment recommendations: bath bench     GOALS:   Occupational Therapy Goals     Not on file      Multidisciplinary Problems (Resolved)        Problem: Occupational Therapy Goal    Goal Priority Disciplines Outcome Interventions   Occupational Therapy " Goal   (Resolved)     OT, PT/OT Outcome(s) achieved              PLAN:  Patient to be seen  (D/c OT ) to address the above listed problems via    Plan of Care expires: 01/06/17  Plan of Care reviewed with: patient    OT G-codes  Functional Assessment Tool Used: rekha pac   Score: 23  Functional Limitation: Self care  Self Care Current Status (): GUILLE  Self Care Goal Status (): CI  Self Care Discharge Status (): GUILLE Larson OT  01/06/2017

## 2017-01-06 NOTE — PLAN OF CARE
Problem: Patient Care Overview  Goal: Plan of Care Review  Outcome: Ongoing (interventions implemented as appropriate)  Patient remains on tele running NSR in the 70s. Patient verbalizes fall precautions, bed in the low lying position, wheels locked, call light within reach. Will continue to monitor, report given to MARYSOL Woodall.

## 2017-01-06 NOTE — PT/OT/SLP EVAL
Physical Therapy  Evaluation    Ambrocio Chin   MRN: [id number]   Admitting Diagnosis: Acute on chronic systolic heart failure    PT Received On: 17  PT Start Time: 1009     PT Stop Time: 1028    PT Total Time (min): 19 min       Billable Minutes:  Evaluation 11 and Gait Training 8    Diagnosis: Acute on chronic systolic heart failure    Past Medical History   Diagnosis Date    CHF (congestive heart failure)     Diabetes mellitus     Hypertension     PVD (peripheral vascular disease)     Stroke      Lt pontine stroke 11/15/2016      Past Surgical History   Procedure Laterality Date    Toe amputation Left      5th toe       General Precautions: Standard, fall  Orthopedic Precautions: N/A   Braces:         Do you have any cultural, spiritual, Latter-day conflicts, given your current situation?: none    Patient History:  Lives With: friend(s)  Living Arrangements: house  Transportation Available: family or friend will provide  Living Environment Comment: pt lives in a [facility] with no LEIA with a friend. Has tub/shower combo. Does not drive or work. Ambulates with RW and has BSC over toilet. Recently d/c from [facility].   Equipment Currently Used at Home: walker, rolling, bedside commode  DME owned (not currently used): none    Previous Level of Function:  Ambulation Skills: needs device  Transfer Skills: independent  ADL Skills: independent    Subjective:  Communicated with RN prior to session.  Pt reports feeling better.   Chief Complaint: decreased independence   Patient goals: return to Mod(I) / (I) PLOF    Pain Ratin/10               Pain Rating Post-Intervention: 0/10    Objective:   Patient found with: peripheral IV, telemetry     Cognitive Exam:  Oriented to: Person, Place, Time and Situation    Follows Commands/attention: Follows multistep  commands  Communication: clear/fluent  Safety awareness/insight to disability: intact    Physical Exam:  Postural examination/scapula alignment: Rounded  shoulder    Skin integrity: Visible skin intact  Edema: Mild B LEs    Sensation:   Intact to LT B LEs    Lower Extremity Range of Motion:  Right Lower Extremity: WFL  Left Lower Extremity: WFL    Lower Extremity Strength:  B LEs grossly 3+ to 4/5     Fine motor coordination:  Intact    Gross motor coordination: WFL    Functional Mobility:  Bed Mobility:  Rolling/Turning to Left: Supervision  Rolling/Turning Right: Supervision  Scooting/Bridging: Supervision  Supine to Sit: Supervision  Sit to Supine: Supervision    Transfers:  Sit <> Stand Assistance: Stand By Assistance  Sit <> Stand Assistive Device: Rolling Walker    Gait:   Gait Distance: X150 feet   Assistance 1: Contact Guard Assistance, Stand by Assistance  Gait Assistive Device: Rolling walker  Gait Pattern: reciprocal  Gait Deviation(s): decreased obdulio, forward lean (toe extension B)    Balance:   Static Sit: GOOD: Takes MODERATE challenges from all directions  Dynamic Sit: GOOD: Maintains balance through MODERATE excursions of active trunk movement  Static Stand: GOOD: Takes MODERATE challenges from all directions  Dynamic stand: FAIR+: Needs CLOSE SUPERVISION during gait and is able to right self with minor LOB    Therapeutic Activities and Exercises:  Initial PT evaluation completed.   Ambulation in hallway as documented above. Decreased obdulio with mild SOB noted.   Pt left sitting in BS chair with LEs elevated.     AM-PAC 6 CLICK MOBILITY  How much help from another person does this patient currently need?   1 = Unable, Total/Dependent Assistance  2 = A lot, Maximum/Moderate Assistance  3 = A little, Minimum/Contact Guard/Supervision  4 = None, Modified [place]/Independent    Turning over in bed (including adjusting bedclothes, sheets and blankets)?: 4  Sitting down on and standing up from a chair with arms (e.g., wheelchair, bedside commode, etc.): 3  Moving from lying on back to sitting on the side of the bed?: 4  Moving to and from a bed to  a chair (including a wheelchair)?: 3  Need to walk in hospital room?: 3  Climbing 3-5 steps with a railing?: 3  Total Score: 20     AM-PAC Raw Score CMS G-Code Modifier Level of Impairment Assistance   6 % Total / Unable   7 - 9 CM 80 - 100% Maximal Assist   10 - 14 CL 60 - 80% Moderate Assist   15 - 19 CK 40 - 60% Moderate Assist   20 - 22 CJ 20 - 40% Minimal Assist   23 CI 1-20% SBA / CGA   24 CH 0% Independent/ Mod I     Patient left up in chair with all lines intact, call button in reach, bed alarm on and RN notified.    Assessment:   Ambrocio Chin is a 66 y.o. male with a medical diagnosis of Acute on chronic systolic heart failure and presents with decreased strength and endurance. Pt noted to have mild SOB post ambulation in hallway. Patient will continue to benefit from therapy services to address impairments and progress functional mobility as able.     Rehab identified problem list/impairments: Rehab identified problem list/impairments: weakness, impaired endurance, impaired functional mobilty, edema    Rehab potential is good.    Activity tolerance: Good    Discharge recommendations: Discharge Facility/Level Of Care Needs: home     Barriers to discharge: Barriers to Discharge: None    Equipment recommendations: Equipment Needed After Discharge: bath bench     GOALS:   Physical Therapy Goals        Problem: Physical Therapy Goal    Goal Priority Disciplines Outcome Goal Variances Interventions   Physical Therapy Goal     PT/OT, PT Ongoing (interventions implemented as appropriate)     Description:  Goals to be met by: 17     Patient will increase functional independence with mobility by performin. Supine <> sit with [place]  2. Sit to stand transfer with Modified [place]  3. Gait  x 150 feet with Modified [place] using Rolling Walker.   4. Ascend/Descend curb step with Stand-by Assistance   5. Lower extremity exercise program x15 reps with independence                PLAN:     Patient to be seen 5 x/week to address the above listed problems via gait training, therapeutic activities, therapeutic exercises  Plan of Care expires: 02/05/17  Plan of Care reviewed with: patient    Functional Assessment Tool Used: ampac  Score: 20  Functional Limitation: Mobility: Walking and moving around  Mobility: Walking and Moving Around Current Status (): CJ  Mobility: Walking and Moving Around Goal Status (): GUILLE Atkins DPT  01/06/2017

## 2017-01-06 NOTE — PROGRESS NOTES
Progress Note  Nephrology      Consult Requested By: Jayy Gaytan MD      SUBJECTIVE:     Overnight events  Patient is a 66 y.o. male     Patient Active Problem List   Diagnosis    Hypertension    Type 2 diabetes mellitus with hyperglycemia, with long-term current use of insulin    Hyperlipidemia    CKD stage 3 due to type 2 diabetes mellitus    JANEL (acute kidney injury)    PVD (peripheral vascular disease) with claudication    Acute on chronic systolic heart failure    Chronic pulmonary edema    Stenosis of left carotid artery    Iron deficiency anemia    Left pontine stroke    Fever    Oropharyngeal dysphagia    Aspiration pneumonia of left lower lobe    Left-sided cerebrovascular accident (CVA)    Benign essential HTN    Long term (current) use of anticoagulants    Thrombus    Congestive heart failure    Hyperkalemia     Past Medical History   Diagnosis Date    CHF (congestive heart failure)     Diabetes mellitus     Hypertension     PVD (peripheral vascular disease)     Stroke      Lt pontine stroke 11/15/2016              OBJECTIVE:     Vitals:    01/06/17 0800 01/06/17 0900 01/06/17 1100 01/06/17 1156   BP:       BP Location:       Patient Position:       BP Method:       Pulse:  82 82    Resp:       Temp:       TempSrc:       SpO2: (!) 92%   (!) 91%   Weight:       Height:           Temp: 97.8 °F (36.6 °C) (01/06/17 0715)  Pulse: 82 (01/06/17 1100)  Resp: 18 (01/06/17 0715)  BP: 132/72 (01/06/17 0715)  SpO2: (!) 91 % (01/06/17 1156)      Date 01/06/17 0700 - 01/07/17 0659   Shift 1944-5102 9935-4235 6539-4250 24 Hour Total   I  N  T  A  K  E   I.V.  (mL/kg) 26  (0.3)   26  (0.3)    IV Piggyback 100   100    Shift Total  (mL/kg) 126  (1.4)   126  (1.4)   O  U  T  P  U  T   Urine  (mL/kg/hr) 800   800    Shift Total  (mL/kg) 800  (8.6)   800  (8.6)   Weight (kg) 92.5 92.5 92.5 92.5             Medications:   amlodipine  10 mg Oral QHS    aspirin  81 mg Oral Daily    citalopram  20  mg Oral Daily    cloNIDine 0.1 mg/24 hr td ptwk  1 patch Transdermal Q7 Days    epoetin reynaldo (PROCRIT) injection  20,000 Units Subcutaneous Q7 Days    ferrous sulfate  325 mg Oral Daily    heparin (porcine)  5,000 Units Subcutaneous Q12H    hydrALAZINE  50 mg Oral TID    insulin detemir  8 Units Subcutaneous QHS    iron sucrose (VENOFER) IVPB  100 mg Intravenous Every Mon, Wed, Fri    metOLazone  10 mg Oral Daily    rosuvastatin  40 mg Oral Daily    sodium chloride 0.9%  3 mL Intravenous Q8H    trazodone  50 mg Oral QHS    vitamin renal formula (B-complex-vitamin c-folic acid)  1 capsule Oral Daily      bumetanide (BUMEX) infusion 1 mg/hr (01/06/17 0941)               Physical Exam:  General appearance: NAD  Feeling better  SOB less  Lungs: diminished breath sounds  Heart: Pulse 82  /72  Abdomen: soft  Extremities: edema  Skin: dry  laboratory:  ABG  Labs reviewed  Recent Results (from the past 336 hour(s))   Basic metabolic panel     Collection Time: 01/06/17  3:16 AM   Result Value Ref Range    Sodium 135 (L) 136 - 145 mmol/L    Potassium 4.3 3.5 - 5.1 mmol/L    Chloride 99 95 - 110 mmol/L    CO2 29 23 - 29 mmol/L    BUN, Bld 66 (H) 8 - 23 mg/dL    Creatinine 3.8 (H) 0.5 - 1.4 mg/dL    Calcium 8.1 (L) 8.7 - 10.5 mg/dL    Anion Gap 7 (L) 8 - 16 mmol/L   Basic metabolic panel     Collection Time: 01/05/17  3:37 AM   Result Value Ref Range    Sodium 137 136 - 145 mmol/L    Potassium 4.3 3.5 - 5.1 mmol/L    Chloride 104 95 - 110 mmol/L    CO2 26 23 - 29 mmol/L    BUN, Bld 69 (H) 8 - 23 mg/dL    Creatinine 3.8 (H) 0.5 - 1.4 mg/dL    Calcium 8.2 (L) 8.7 - 10.5 mg/dL    Anion Gap 7 (L) 8 - 16 mmol/L   Basic metabolic panel     Collection Time: 01/04/17  6:15 AM   Result Value Ref Range    Sodium 139 136 - 145 mmol/L    Potassium 5.1 3.5 - 5.1 mmol/L    Chloride 107 95 - 110 mmol/L    CO2 25 23 - 29 mmol/L    BUN, Bld 76 (H) 8 - 23 mg/dL    Creatinine 3.8 (H) 0.5 - 1.4 mg/dL    Calcium 8.7 8.7 - 10.5  mg/dL    Anion Gap 7 (L) 8 - 16 mmol/L     Recent Results (from the past 336 hour(s))   CBC auto differential    Collection Time: 01/06/17  3:16 AM   Result Value Ref Range    WBC 5.54 3.90 - 12.70 K/uL    Hemoglobin 8.4 (L) 14.0 - 18.0 g/dL    Hematocrit 26.3 (L) 40.0 - 54.0 %    Platelets 278 150 - 350 K/uL   CBC auto differential    Collection Time: 01/05/17  3:37 AM   Result Value Ref Range    WBC 5.23 3.90 - 12.70 K/uL    Hemoglobin 8.0 (L) 14.0 - 18.0 g/dL    Hematocrit 24.7 (L) 40.0 - 54.0 %    Platelets 278 150 - 350 K/uL   CBC auto differential    Collection Time: 01/04/17  6:15 AM   Result Value Ref Range    WBC 5.71 3.90 - 12.70 K/uL    Hemoglobin 8.7 (L) 14.0 - 18.0 g/dL    Hematocrit 27.7 (L) 40.0 - 54.0 %    Platelets 307 150 - 350 K/uL     Urinalysis  No results for input(s): COLORU, CLARITYU, SPECGRAV, PHUR, PROTEINUA, GLUCOSEU, BILIRUBINCON, BLOODU, WBCU, RBCU, BACTERIA, MUCUS, NITRITE, LEUKOCYTESUR, UROBILINOGEN, HYALINECASTS in the last 24 hours.    Diagnostic Results:  X-Ray: Reviewed  US: Reviewed  Echo: Reviewed  ACCESS    ASSESSMENT/PLAN:     JANEL on CKD 4/A3.  UO 4975 cc/24 hours.  Creatinine 3.8.  Azotemia. BUN 66.  Metabolic bone disease.  Hyperphosphatemia.  Anemia. Low iron.  Poor nutrition.  Edema. On bumex drip.  Hypertension. /80.  Will need AVF soon for future HD.   Will need to follow  up with nephrology [phone],  Dr Giovani Elkins for AV fistula placement.

## 2017-01-06 NOTE — PLAN OF CARE
Problem: Patient Care Overview  Goal: Plan of Care Review  Saturations were as noted in charting.  No signs of respiratory distress but saturations were fluctuating.  Placed patient on nasal cannula at 2        LPM.  Will continue to monitor.

## 2017-01-06 NOTE — PLAN OF CARE
Problem: Occupational Therapy Goal  Goal: Occupational Therapy Goal  Outcome: Outcome(s) achieved Date Met:  01/06/17  Pt performing SBA-supervision for ADLs and functional mobility. Pt reports no concerns with independence with ADLs at this time. Pt remains with mild R sided deficits from previous CVA. Recommending home with TTB when medically stable. D/c OT

## 2017-01-06 NOTE — PROGRESS NOTES
Progress Note  Nephrology      Consult Requested By: Jorge Thomas MD      SUBJECTIVE:     Overnight events  Patient is a 66 y.o. male     Patient Active Problem List   Diagnosis    Hypertension    Type 2 diabetes mellitus with hyperglycemia, with long-term current use of insulin    Hyperlipidemia    CKD stage 3 due to type 2 diabetes mellitus    JANEL (acute kidney injury)    PVD (peripheral vascular disease) with claudication    Acute on chronic systolic heart failure    Chronic pulmonary edema    Stenosis of left carotid artery    Iron deficiency anemia    Left pontine stroke    Fever    Oropharyngeal dysphagia    Aspiration pneumonia of left lower lobe    Left-sided cerebrovascular accident (CVA)    Benign essential HTN    Long term (current) use of anticoagulants    Thrombus    Congestive heart failure    Hyperkalemia     Past Medical History   Diagnosis Date    CHF (congestive heart failure)     Diabetes mellitus     Hypertension     PVD (peripheral vascular disease)     Stroke      Lt pontine stroke 11/15/2016              OBJECTIVE:     Vitals:    01/05/17 1500 01/05/17 1600 01/05/17 1637 01/05/17 1700   BP:  128/68     BP Location:  Left arm     Patient Position:  Lying     BP Method:  Automatic     Pulse: 80 83  84   Resp:  19     Temp:  97.6 °F (36.4 °C)     TempSrc:  Oral     SpO2:   (!) 94%    Weight:       Height:           Temp: 97.6 °F (36.4 °C) (01/05/17 1600)  Pulse: 84 (01/05/17 1700)  Resp: 19 (01/05/17 1600)  BP: 128/68 (01/05/17 1600)  SpO2: (!) 94 % (01/05/17 1637)      Date 01/05/17 0700 - 01/06/17 0659   Shift 2692-8661 5155-0174 8270-8186 24 Hour Total   I  N  T  A  K  E   P.O. 520 330  850    I.V.  (mL/kg) 100  (1) 53  (0.5)  153  (1.5)    Shift Total  (mL/kg) 620  (6.2) 383  (3.8)  1003  (10.1)   O  U  T  P  U  T   Urine  (mL/kg/hr) 950  (1.2) 1400  2350    Shift Total  (mL/kg) 950  (9.5) 1400  (14.1)  2350  (23.6)   Weight (kg) 99.5 99.5 99.5 99.5              Medications:   amlodipine  10 mg Oral QHS    aspirin  81 mg Oral Daily    citalopram  20 mg Oral Daily    cloNIDine 0.1 mg/24 hr td ptwk  1 patch Transdermal Q7 Days    epoetin reynaldo (PROCRIT) injection  20,000 Units Subcutaneous Q7 Days    ferrous sulfate  325 mg Oral Daily    hydrALAZINE  50 mg Oral TID    insulin detemir  8 Units Subcutaneous QHS    iron sucrose (VENOFER) IVPB  100 mg Intravenous Every Mon, Wed, Fri    metOLazone  10 mg Oral Daily    rosuvastatin  40 mg Oral Daily    sodium chloride 0.9%  3 mL Intravenous Q8H    trazodone  50 mg Oral QHS    vitamin renal formula (B-complex-vitamin c-folic acid)  1 capsule Oral Daily      bumetanide (BUMEX) infusion 1 mg/hr (01/05/17 1425)               Physical Exam:  General appearance: NAD  Feeling better  Lungs: diminished breath sounds  Heart: Pulse 84  Abdomen: soft  Extremities: edema  Laboratory:  ABG  Labs reviewed  Recent Results (from the past 336 hour(s))   Basic metabolic panel     Collection Time: 01/05/17  3:37 AM   Result Value Ref Range    Sodium 137 136 - 145 mmol/L    Potassium 4.3 3.5 - 5.1 mmol/L    Chloride 104 95 - 110 mmol/L    CO2 26 23 - 29 mmol/L    BUN, Bld 69 (H) 8 - 23 mg/dL    Creatinine 3.8 (H) 0.5 - 1.4 mg/dL    Calcium 8.2 (L) 8.7 - 10.5 mg/dL    Anion Gap 7 (L) 8 - 16 mmol/L   Basic metabolic panel     Collection Time: 01/04/17  6:15 AM   Result Value Ref Range    Sodium 139 136 - 145 mmol/L    Potassium 5.1 3.5 - 5.1 mmol/L    Chloride 107 95 - 110 mmol/L    CO2 25 23 - 29 mmol/L    BUN, Bld 76 (H) 8 - 23 mg/dL    Creatinine 3.8 (H) 0.5 - 1.4 mg/dL    Calcium 8.7 8.7 - 10.5 mg/dL    Anion Gap 7 (L) 8 - 16 mmol/L   Basic metabolic panel    Collection Time: 01/03/17 11:41 PM   Result Value Ref Range    Sodium 138 136 - 145 mmol/L    Potassium 5.8 (H) 3.5 - 5.1 mmol/L    Chloride 107 95 - 110 mmol/L    CO2 22 (L) 23 - 29 mmol/L    BUN, Bld 78 (H) 8 - 23 mg/dL    Creatinine 3.8 (H) 0.5 - 1.4 mg/dL     Calcium 8.5 (L) 8.7 - 10.5 mg/dL    Anion Gap 9 8 - 16 mmol/L     Recent Results (from the past 336 hour(s))   CBC auto differential    Collection Time: 01/05/17  3:37 AM   Result Value Ref Range    WBC 5.23 3.90 - 12.70 K/uL    Hemoglobin 8.0 (L) 14.0 - 18.0 g/dL    Hematocrit 24.7 (L) 40.0 - 54.0 %    Platelets 278 150 - 350 K/uL   CBC auto differential    Collection Time: 01/04/17  6:15 AM   Result Value Ref Range    WBC 5.71 3.90 - 12.70 K/uL    Hemoglobin 8.7 (L) 14.0 - 18.0 g/dL    Hematocrit 27.7 (L) 40.0 - 54.0 %    Platelets 307 150 - 350 K/uL   CBC auto differential    Collection Time: 01/04/17  6:15 AM   Result Value Ref Range    WBC 5.43 3.90 - 12.70 K/uL    Hemoglobin 8.8 (L) 14.0 - 18.0 g/dL    Hematocrit 27.7 (L) 40.0 - 54.0 %    Platelets 309 150 - 350 K/uL     Urinalysis  No results for input(s): COLORU, CLARITYU, SPECGRAV, PHUR, PROTEINUA, GLUCOSEU, BILIRUBINCON, BLOODU, WBCU, RBCU, BACTERIA, MUCUS, NITRITE, LEUKOCYTESUR, UROBILINOGEN, HYALINECASTS in the last 24 hours.    Diagnostic Results:  X-Ray: Reviewed  US: Reviewed  Echo: Reviewed  ACCESS    ASSESSMENT/PLAN:   Georgie on CKD 4/A3  UO 2350 cc today.  Creatinine 3.8.  Anemia. Iron Epogen.  Blood pressure 128/68.  Weight 99.5.  Net since admit - 5,199 cc.  Will need AV fistula placement soon.  Discussed with patient.

## 2017-01-06 NOTE — PLAN OF CARE
Problem: Physical Therapy Goal  Goal: Physical Therapy Goal  Goals to be met by: 17     Patient will increase functional independence with mobility by performin. Supine <> sit with [place]  2. Sit to stand transfer with Modified [place]  3. Gait x 150 feet with Modified [place] using Rolling Walker.   4. Ascend/Descend curb step with Stand-by Assistance   5. Lower extremity exercise program x15 reps with independence  Outcome: Ongoing (interventions implemented as appropriate)  Initial PT evaluation completed. Pt will continue to benefit from therapy services.      Recommend tub bench upon return home.

## 2017-01-06 NOTE — PROGRESS NOTES
[facility] Resident [facility] Progress Note    Subjective:      Patient states he feels much better and his breathing has improved. Denies fever, chills, chest pain, abdominal pain, dysuria, dizziness, lightheadedness.     Objective:   Last 24 Hour Vital Signs:  BP  Min: 127/64  Max: 146/69  Temp  Av.7 °F (36.5 °C)  Min: 97.3 °F (36.3 °C)  Max: 98.2 °F (36.8 °C)  Pulse  Av.7  Min: 73  Max: 89  Resp  Av.3  Min: 18  Max: 19  SpO2  Av.2 %  Min: 92 %  Max: 96 %  Weight  Av.5 kg (203 lb 14.8 oz)  Min: 92.5 kg (203 lb 14.8 oz)  Max: 92.5 kg (203 lb 14.8 oz)  I/O last 3 completed shifts:  In: 2652.2 [P.O.:2290; I.V.:262.2; IV Piggyback:100]  Out: 6950 [Urine:6950]    Physical Examination:  Last 24 Hour Vital Signs:  BP  Min: 127/64  Max: 146/69  Temp  Av.7 °F (36.5 °C)  Min: 97.3 °F (36.3 °C)  Max: 98.2 °F (36.8 °C)  Pulse  Av.7  Min: 73  Max: 89  Resp  Av.3  Min: 18  Max: 19  SpO2  Av.2 %  Min: 92 %  Max: 96 %  Weight  Av.5 kg (203 lb 14.8 oz)  Min: 92.5 kg (203 lb 14.8 oz)  Max: 92.5 kg (203 lb 14.8 oz)  Body mass index is 36.12 kg/(m^2).  I/O last 3 completed shifts:  In: 2652.2 [P.O.:2290; I.V.:262.2; IV Piggyback:100]  Out: 6950 [Urine:6950]    General:  Alert and awake in no apparent distress  Head:    Normocephalic and atraumatic  Eyes:    PERRL; EOMi with anicteric sclera and clear conjunctivae  Mouth:   Oropharynx clear and without exudate; moist mucous membranes  Cardio:   Regular rate and rhythm with normal S1 and S2; no murmurs or rubs  Resp:    CTAB; respirations unlabored; no wheezes, crackles or rhonchi  Abdom:  Soft, NTND with normoactive bowel sounds  Extrem:  Warm and well-perfused with no clubbing, 4+ pitting edema to thighs and on hands bilatteraly  Skin:    No rashes, lesions, or color changes  Pulses:  2+ and symmetric distally  Neuro:   AAOx3; cooperative and pleasant with no focal deficits    Laboratory:  Laboratory Data  Pertinent  Findings:  Recent Labs      01/03/17   1651  01/04/17   0615  01/05/17   0337  01/06/17   0316   WBC  5.78  5.43  5.71  5.23  5.54   HGB  8.2*  8.8*  8.7*  8.0*  8.4*   HCT  26.3*  27.7*  27.7*  24.7*  26.3*   PLT  273  309  307  278  278   MCV  94  92  92  92  91   RDW  16.3*  16.4*  16.4*  16.5*  16.0*   GRAN  81.0*  4.7  75.0*  74.6*  4.1  4.3  75.0*  3.9  78.5*  4.4   LYMPH  8.1*  0.5*  13.6*  12.4*  0.7*  0.7*  9.9*  0.5*  7.9*  0.4*   MONO  7.3  0.4  6.8  8.8  0.4  0.5  10.7  0.6  9.2  0.5   EOS  0.2  0.2  0.2  0.2  0.2   BASO  0.03  0.04  0.03  0.03  0.02   EOSINOPHIL  2.9  3.7  3.5  3.6  3.8   BASOPHIL  0.5  0.7  0.5  0.6  0.4       Recent Labs      01/03/17 2008 01/03/17   2341  01/04/17   0600  01/04/17   0615  01/05/17   0337  01/06/17   0316   NA  138  138   --   139  137  135*   K  5.7*  5.8*   --   5.1  4.3  4.3   CL  107  107   --   107  104  99   CO2  21*  22*   --   25  26  29   BUN  77*  78*   --   76*  69*  66*   CREATININE  4.0*  3.8*  3.8*  3.8*  3.8*  3.8*     Recent Labs      01/05/17   0641  01/05/17   1117  01/05/17   1624  01/05/17   2043  01/06/17   0525   POCTGLUCOSE  96  142*  220*  346*  128*     Recent Labs      01/03/17   1651   PROT  6.2   ALBUMIN  2.7*   BILITOT  0.4   AST  16   ALT  25   ALKPHOS  124       Microbiology Data  Pertinent Findings:  None    Other Results:  EKG (my interpretation): No New EKG    Radiology Data   Pertinent Findings (my interpretation):  No new imaging    Current Medications:     Infusions:   bumetanide (BUMEX) infusion 1 mg/hr (01/06/17 0058)        Scheduled:   amlodipine  10 mg Oral QHS    aspirin  81 mg Oral Daily    citalopram  20 mg Oral Daily    cloNIDine 0.1 mg/24 hr td ptwk  1 patch Transdermal Q7 Days    epoetin reynaldo (PROCRIT) injection  20,000 Units Subcutaneous Q7 Days    ferrous sulfate  325 mg Oral Daily    hydrALAZINE  50 mg Oral TID    insulin detemir  8 Units Subcutaneous QHS    iron sucrose  (VENOFER) IVPB  100 mg Intravenous Every Mon, Wed, Fri    metOLazone  10 mg Oral Daily    rosuvastatin  40 mg Oral Daily    sodium chloride 0.9%  3 mL Intravenous Q8H    trazodone  50 mg Oral QHS    vitamin renal formula (B-complex-vitamin c-folic acid)  1 capsule Oral Daily        PRN:  flu vac [id number] 36mos up(PF), pneumoc 13-eugenio conj-dip cr(PF)    Antibiotics and Day Number of Therapy:  None     Lines and Day Number of Therapy:  PIV x2, 20G, 2G    Assessment:     Ambrocio Chin is a 66 y.o.male with  Patient Active Problem List    Diagnosis Date Noted    Congestive heart failure 01/03/2017    Hyperkalemia 01/03/2017    Long term (current) use of anticoagulants 12/05/2016    Thrombus 12/05/2016    Aspiration pneumonia of left lower lobe 11/18/2016    Left-sided cerebrovascular accident (CVA) 11/18/2016    Benign essential HTN 11/18/2016    Fever 11/17/2016    Oropharyngeal dysphagia 11/17/2016    Iron deficiency anemia 11/16/2016    Left pontine stroke 11/16/2016    Stenosis of left carotid artery     Hypertension 11/15/2016    Type 2 diabetes mellitus with hyperglycemia, with long-term current use of insulin 11/15/2016    Hyperlipidemia 11/15/2016    CKD stage 3 due to type 2 diabetes mellitus 11/15/2016    JANEL (acute kidney injury) 11/15/2016    PVD (peripheral vascular disease) with claudication 11/15/2016    Acute on chronic systolic heart failure 11/15/2016    Chronic pulmonary edema 11/15/2016        Plan:     65yo [place]ish speaking M w/ PMH HFrEF, L pontine infarct on 11/15/16 s/p inpatient rehab discharged 12/2/16, LV thrombus on warfarin, HTN, DM2, HLD, CKD4, diabetic retinopathy with complaint of SOB x3 days.      Fluid Overload in setting of HFrEF and CKD4  - Patient grossly fluid overloaded. SOB x 3 days, 10lb weight gain over past 2 weeks  - 4+ pitting edema to thighs and on hands bilaterally, JVP 15cm  - 1/3/16 CXR: cardiomegaly and pulmonary vascular congestion along with  pulmonary edema and pleural effusions consistent w/ CHF  - 11/16/16 TTE: LV EF 35%   - 1/5/16 TTE: LV EF 65%, diastolic dysfunction, Pulm HTN PA Sys Pr 48mmHg (EF improved from previous)  - At admission, BNP 1670. Troponin x2 and EKG neg for ischemia  - Hold home Coreg in setting acute HFrEF exacerbation. Hold home lisinopril in setting of JANEL  - Metolazone 10mg PO x1 in ED  - Continue Bumex GTT as per Nephrology recommendations, monitor I&O and daily weights  - Currently, Net I&O -7.5L since admission  - Continue to follow nephrology recommendations: holding off on dialysis for now      JANEL on CKD4  - Likely secondary to cardio-renal syndrome due to volume overload secondary to HFrEF  - At admission, Cr 4.1 (baseline Cr 2.6-2.9). Cr today 3.8   - Hold home lisinopril in setting of JANEL  - 1/3/16 Renal US: Bilateral echogenic kidneys, compatible with medical renal disease. Increased resistive index involving the right kidney.  - Protein:Cr Ratio: 2.75, FeNa = 3.6% (likely intrinsic renal cause)  - 1/4/17 SPEP: elevated Lambda and Kappa Free Light Chains: K =14.6, L = 9.18.  - 1/3/16 24hr Cr Clearance: 19.5 (low). 24hr Urine Protien: 45 (high). 24hr Urine Microalb: 850 (high)  - F/u urine labs   - Nephrology consulted, holding dialysis for now  - Continue diuresis w/ Bumex GTT     Hyperkalemia, resolved  - On admission, K 6.0 (previously 12/16/16 K 5.7)  - No EKG changes, no peaked T-waves, no ST-changes, no widening of QRS  - Ca Gluconate, Albuterol Neb, Kayexalate given in ED  - Currently, K 4.3  - Continue on Bumex GTT     LV Thrombus, resolved  - 11/17/16 TTE: small round apical thrombus attached to the lateral part of the apex.  - Patient previously started on Warfarin, concern for medication non-compliance  - At admission, sub-theraputic INR 1.1  - 1/5/16 TTE: There is no evidence of intracavity mass, thrombi, or vegetation.   - Discontinue home coumadin       Hx L pontine CVA  - 11/15/16 admitted for L pontine  infarct, likely source was LV Thrombus  - S/p inpatient rehab discharged 12/2/16  - Stable mild right sided deficits, mild dysarthria, no acute issues  - Continue home aspirin, plavix, hold coumadin for now for possible line placement      Palpitiaions  - S/p loop Recorder implanted 11/17/16  - Currently no acute issues, will monitor on telemetry  - F/u outpatient      HTN  - On admission, /77 - now improved  - Continue on home amlodipine, clonidine 0.1mg/24hr patch, hydralazine TID   - Hold home coreg due to acute HFrEF decompensation       Normocytic Anemia  - At admission H/H 8.2/26.3 MCV 94  - Likely secondary to iron deficiency and CKD  - 11/16/16 Iron <10, Ferritin 546, B12 and Folate WNL  - Continue Venifer 3x per wk  - Continue home ferrous sulfate/colace      DM2 w/ neuropathy, nephropathy, retinopathy  - 11/15/16 A1C 10.5  - home dose insulin Detemir 12U + SSI  - glucose low this AM, decrease to 8U nightly while in house      HLD  - 11/15/16 Lipid Panel: TChol 183, , HDL 27  - Continue home rosuvastatin      Anxiety/Depression  - Stable, denies SI/HI  - Continue home citalopram and trazodone qHS      DVT PPx: Heparin  Diet: Renal  Code: Full      Disposition: pending clinical improvement    Justyn Albarran  [facility] Internal Medicine HO-I  [facility] Internal Medicine Service Team B    [facility] Medicine Hospitalist Pager numbers:   [facility] Hospitalist Medicine Team A (Patsy/Nando): [phone]  [facility] Hospitalist Medicine Team B (Lukas/Martha):  [phone]

## 2017-01-07 LAB
ANION GAP SERPL CALC-SCNC: 7 MMOL/L
ANION GAP SERPL CALC-SCNC: 7 MMOL/L
BASOPHILS # BLD AUTO: 0.03 K/UL
BASOPHILS NFR BLD: 0.6 %
BUN SERPL-MCNC: 66 MG/DL
BUN SERPL-MCNC: 66 MG/DL
CALCIUM SERPL-MCNC: 8.3 MG/DL
CALCIUM SERPL-MCNC: 8.3 MG/DL
CHLORIDE SERPL-SCNC: 97 MMOL/L
CHLORIDE SERPL-SCNC: 97 MMOL/L
CO2 SERPL-SCNC: 33 MMOL/L
CO2 SERPL-SCNC: 33 MMOL/L
CREAT SERPL-MCNC: 3.6 MG/DL
CREAT SERPL-MCNC: 3.6 MG/DL
DIFFERENTIAL METHOD: ABNORMAL
EOSINOPHIL # BLD AUTO: 0.2 K/UL
EOSINOPHIL NFR BLD: 4 %
ERYTHROCYTE [DISTWIDTH] IN BLOOD BY AUTOMATED COUNT: 15.6 %
EST. GFR  (AFRICAN AMERICAN): 19 ML/MIN/1.73 M^2
EST. GFR  (AFRICAN AMERICAN): 19 ML/MIN/1.73 M^2
EST. GFR  (NON AFRICAN AMERICAN): 17 ML/MIN/1.73 M^2
EST. GFR  (NON AFRICAN AMERICAN): 17 ML/MIN/1.73 M^2
GLUCOSE SERPL-MCNC: 125 MG/DL
GLUCOSE SERPL-MCNC: 125 MG/DL
HCT VFR BLD AUTO: 25.4 %
HGB BLD-MCNC: 8 G/DL
INR PPP: 1.1
LYMPHOCYTES # BLD AUTO: 0.5 K/UL
LYMPHOCYTES NFR BLD: 10.2 %
MAGNESIUM SERPL-MCNC: 2.3 MG/DL
MCH RBC QN AUTO: 29.2 PG
MCHC RBC AUTO-ENTMCNC: 31.5 %
MCV RBC AUTO: 93 FL
MONOCYTES # BLD AUTO: 0.5 K/UL
MONOCYTES NFR BLD: 9.7 %
NEUTROPHILS # BLD AUTO: 4 K/UL
NEUTROPHILS NFR BLD: 75.5 %
PHOSPHATE SERPL-MCNC: 5.3 MG/DL
PLATELET # BLD AUTO: 293 K/UL
PMV BLD AUTO: 8.6 FL
POCT GLUCOSE: 149 MG/DL (ref 70–110)
POCT GLUCOSE: 159 MG/DL (ref 70–110)
POCT GLUCOSE: 198 MG/DL (ref 70–110)
POCT GLUCOSE: 99 MG/DL (ref 70–110)
POTASSIUM SERPL-SCNC: 3.9 MMOL/L
POTASSIUM SERPL-SCNC: 3.9 MMOL/L
PROTHROMBIN TIME: 12.1 SEC
RBC # BLD AUTO: 2.74 M/UL
SODIUM SERPL-SCNC: 137 MMOL/L
SODIUM SERPL-SCNC: 137 MMOL/L
TB INDURATION 48 - 72 HR READ: 0 MM
WBC # BLD AUTO: 5.28 K/UL

## 2017-01-07 PROCEDURE — 36415 COLL VENOUS BLD VENIPUNCTURE: CPT

## 2017-01-07 PROCEDURE — 63600175 PHARM REV CODE 636 W HCPCS: Performed by: INTERNAL MEDICINE

## 2017-01-07 PROCEDURE — 84100 ASSAY OF PHOSPHORUS: CPT

## 2017-01-07 PROCEDURE — 80048 BASIC METABOLIC PNL TOTAL CA: CPT

## 2017-01-07 PROCEDURE — 85610 PROTHROMBIN TIME: CPT

## 2017-01-07 PROCEDURE — 25000003 PHARM REV CODE 250: Performed by: INTERNAL MEDICINE

## 2017-01-07 PROCEDURE — 83735 ASSAY OF MAGNESIUM: CPT

## 2017-01-07 PROCEDURE — 94761 N-INVAS EAR/PLS OXIMETRY MLT: CPT

## 2017-01-07 PROCEDURE — 25000003 PHARM REV CODE 250: Performed by: STUDENT IN AN ORGANIZED HEALTH CARE EDUCATION/TRAINING PROGRAM

## 2017-01-07 PROCEDURE — 85025 COMPLETE CBC W/AUTO DIFF WBC: CPT

## 2017-01-07 PROCEDURE — 11000001 HC ACUTE MED/SURG PRIVATE ROOM

## 2017-01-07 RX ORDER — CARVEDILOL 6.25 MG/1
6.25 TABLET ORAL 2 TIMES DAILY
Status: DISCONTINUED | OUTPATIENT
Start: 2017-01-07 | End: 2017-01-09

## 2017-01-07 RX ORDER — FUROSEMIDE 40 MG/1
120 TABLET ORAL 2 TIMES DAILY
Qty: 180 TABLET | Refills: 11 | Status: SHIPPED | OUTPATIENT
Start: 2017-01-07 | End: 2017-01-09 | Stop reason: HOSPADM

## 2017-01-07 RX ORDER — FUROSEMIDE 40 MG/1
120 TABLET ORAL 2 TIMES DAILY
Status: DISCONTINUED | OUTPATIENT
Start: 2017-01-07 | End: 2017-01-07

## 2017-01-07 RX ORDER — INSULIN ASPART 100 [IU]/ML
1-10 INJECTION, SOLUTION INTRAVENOUS; SUBCUTANEOUS
Status: DISCONTINUED | OUTPATIENT
Start: 2017-01-07 | End: 2017-01-09 | Stop reason: HOSPADM

## 2017-01-07 RX ORDER — CARVEDILOL 3.12 MG/1
3.12 TABLET ORAL 2 TIMES DAILY
Qty: 60 TABLET | Refills: 11 | Status: SHIPPED | OUTPATIENT
Start: 2017-01-07 | End: 2017-01-07

## 2017-01-07 RX ORDER — CARVEDILOL 3.12 MG/1
3.12 TABLET ORAL 2 TIMES DAILY
Qty: 60 TABLET | Refills: 11 | Status: SHIPPED | OUTPATIENT
Start: 2017-01-07 | End: 2017-01-09 | Stop reason: HOSPADM

## 2017-01-07 RX ORDER — FUROSEMIDE 40 MG/1
120 TABLET ORAL 2 TIMES DAILY
Qty: 180 TABLET | Refills: 11 | Status: SHIPPED | OUTPATIENT
Start: 2017-01-07 | End: 2017-01-07

## 2017-01-07 RX ORDER — METOLAZONE 10 MG/1
10 TABLET ORAL DAILY
Qty: 30 TABLET | Refills: 11 | Status: ON HOLD | OUTPATIENT
Start: 2017-01-07 | End: 2018-01-07

## 2017-01-07 RX ORDER — ASPIRIN 81 MG/1
81 TABLET ORAL DAILY
Refills: 0 | COMMUNITY
Start: 2017-01-07 | End: 2018-07-05

## 2017-01-07 RX ADMIN — ROSUVASTATIN CALCIUM 40 MG: 10 TABLET, FILM COATED ORAL at 09:01

## 2017-01-07 RX ADMIN — Medication 1 CAPSULE: at 09:01

## 2017-01-07 RX ADMIN — CITALOPRAM HYDROBROMIDE 20 MG: 20 TABLET ORAL at 09:01

## 2017-01-07 RX ADMIN — BUMETANIDE 1 MG/HR: 0.25 INJECTION INTRAMUSCULAR; INTRAVENOUS at 06:01

## 2017-01-07 RX ADMIN — HYDRALAZINE HYDROCHLORIDE 50 MG: 25 TABLET ORAL at 10:01

## 2017-01-07 RX ADMIN — FERROUS SULFATE TAB EC 325 MG (65 MG FE EQUIVALENT) 325 MG: 325 (65 FE) TABLET DELAYED RESPONSE at 09:01

## 2017-01-07 RX ADMIN — CARVEDILOL 6.25 MG: 6.25 TABLET, FILM COATED ORAL at 10:01

## 2017-01-07 RX ADMIN — HEPARIN SODIUM 5000 UNITS: 5000 INJECTION, SOLUTION INTRAVENOUS; SUBCUTANEOUS at 09:01

## 2017-01-07 RX ADMIN — AMLODIPINE BESYLATE 10 MG: 5 TABLET ORAL at 10:01

## 2017-01-07 RX ADMIN — METOLAZONE 10 MG: 5 TABLET ORAL at 09:01

## 2017-01-07 RX ADMIN — ASPIRIN 81 MG: 81 TABLET, COATED ORAL at 09:01

## 2017-01-07 RX ADMIN — SODIUM CHLORIDE, PRESERVATIVE FREE 3 ML: 5 INJECTION INTRAVENOUS at 10:01

## 2017-01-07 RX ADMIN — INSULIN ASPART 2 UNITS: 100 INJECTION, SOLUTION INTRAVENOUS; SUBCUTANEOUS at 12:01

## 2017-01-07 RX ADMIN — HYDRALAZINE HYDROCHLORIDE 50 MG: 25 TABLET ORAL at 06:01

## 2017-01-07 RX ADMIN — TRAZODONE HYDROCHLORIDE 50 MG: 50 TABLET ORAL at 10:01

## 2017-01-07 RX ADMIN — HYDRALAZINE HYDROCHLORIDE 50 MG: 25 TABLET ORAL at 03:01

## 2017-01-07 RX ADMIN — CARVEDILOL 3.12 MG: 3.12 TABLET, FILM COATED ORAL at 09:01

## 2017-01-07 RX ADMIN — BUMETANIDE 1 MG/HR: 0.25 INJECTION INTRAMUSCULAR; INTRAVENOUS at 12:01

## 2017-01-07 RX ADMIN — SODIUM CHLORIDE, PRESERVATIVE FREE 3 ML: 5 INJECTION INTRAVENOUS at 02:01

## 2017-01-07 RX ADMIN — HEPARIN SODIUM 5000 UNITS: 5000 INJECTION, SOLUTION INTRAVENOUS; SUBCUTANEOUS at 10:01

## 2017-01-07 RX ADMIN — SODIUM CHLORIDE, PRESERVATIVE FREE 3 ML: 5 INJECTION INTRAVENOUS at 06:01

## 2017-01-07 NOTE — PLAN OF CARE
Problem: Patient Care Overview  Goal: Plan of Care Review  Outcome: Ongoing (interventions implemented as appropriate)  Patient AAOx4 moving all extremities as normal.  Respirations even and unlabored. Skin warm dry intact and normal color of ethnicity. Telemetry monitor on, functioning and displaying no red alarms.  Plan of care discussed and goals created. Patient educated on diagnosis and medications. Understanding verbalized.

## 2017-01-07 NOTE — PROGRESS NOTES
[facility] Internal Medicine Resident NICKI Progress Note    Subjective:      Patient states he feels well this morning, denies SOB, chest pain, fevers, chills, abdominal pain, nausea/vomiting.     Objective:   Last 24 Hour Vital Signs:  BP  Min: 100/49  Max: 173/80  Temp  Av.7 °F (36.5 °C)  Min: 97 °F (36.1 °C)  Max: 98.3 °F (36.8 °C)  Pulse  Av.8  Min: 68  Max: 92  Resp  Av  Min: 18  Max: 20  SpO2  Av %  Min: 91 %  Max: 93 %  Weight  Av.6 kg (201 lb 15.1 oz)  Min: 91.6 kg (201 lb 15.1 oz)  Max: 91.6 kg (201 lb 15.1 oz)  I/O last 3 completed shifts:  In:  [P.O.:1588; I.V.:369; IV Piggyback:100]  Out: 6925 [Urine:6925]    Physical Examination:  Last 24 Hour Vital Signs:  BP  Min: 100/49  Max: 173/80  Temp  Av.7 °F (36.5 °C)  Min: 97 °F (36.1 °C)  Max: 98.3 °F (36.8 °C)  Pulse  Av.8  Min: 68  Max: 92  Resp  Av  Min: 18  Max: 20  SpO2  Av %  Min: 91 %  Max: 93 %  Weight  Av.6 kg (201 lb 15.1 oz)  Min: 91.6 kg (201 lb 15.1 oz)  Max: 91.6 kg (201 lb 15.1 oz)  Body mass index is 35.77 kg/(m^2).  I/O last 3 completed shifts:  In:  [P.O.:1588; I.V.:369; IV Piggyback:100]  Out: 6925 [Urine:6925]    General:   Alert and awake in no apparent distress  Head:    Normocephalic and atraumatic  Eyes:    PERRL; EOMi with anicteric sclera and clear conjunctivae  Mouth:   Oropharynx clear and without exudate; moist mucous membranes  Cardio:   Regular rate and rhythm with normal S1 and S2; no murmurs or rubs  Resp:    CTAB; respirations unlabored; mild rhonchi at bases  Abdom:   Soft, NTND with normoactive bowel sounds  Extrem:   Warm and well-perfused with no clubbing, 1+ pitting edema to thighs and on hands bilatteraly  Skin:     No rashes, lesions, or color changes  Pulses:   2+ and symmetric distally  Neuro:   AAOx3; cooperative and pleasant with no focal deficits  Laboratory:  Laboratory Data  Pertinent Findings:  Recent Labs      17   0337  17   0316  17   0348    WBC  5.23  5.54  5.28   HGB  8.0*  8.4*  8.0*   HCT  24.7*  26.3*  25.4*   PLT  278  278  293   MCV  92  91  93   RDW  16.5*  16.0*  15.6*   GRAN  75.0*  3.9  78.5*  4.4  75.5*  4.0   LYMPH  9.9*  0.5*  7.9*  0.4*  10.2*  0.5*   MONO  10.7  0.6  9.2  0.5  9.7  0.5   EOS  0.2  0.2  0.2   BASO  0.03  0.02  0.03   EOSINOPHIL  3.6  3.8  4.0   BASOPHIL  0.6  0.4  0.6       Recent Labs      01/05/17   0337  01/06/17   0316  01/07/17   0348   NA  137  135*  137  137   K  4.3  4.3  3.9  3.9   CL  104  99  97  97   CO2  26  29  33*  33*   BUN  69*  66*  66*  66*   CREATININE  3.8*  3.8*  3.6*  3.6*     Recent Labs      01/06/17   0525  01/06/17   1124  01/06/17   1549  01/06/17   2013  01/07/17   0524   POCTGLUCOSE  128*  163*  211*  377*  99     No results for input(s): PROT, ALBUMIN, BILITOT, AST, ALT, ALKPHOS in the last 72 hours.    Microbiology Data  Pertinent Findings:  No new data    Other Results:  EKG (my interpretation): No new EKG    Radiology Data   Pertinent Findings (my interpretation):  No new imaging    Current Medications:     Infusions:   bumetanide (BUMEX) infusion 1 mg/hr (01/07/17 0602)        Scheduled:   amlodipine  10 mg Oral QHS    aspirin  81 mg Oral Daily    carvedilol  3.125 mg Oral BID    citalopram  20 mg Oral Daily    cloNIDine 0.1 mg/24 hr td ptwk  1 patch Transdermal Q7 Days    epoetin reynaldo (PROCRIT) injection  20,000 Units Subcutaneous Q7 Days    ferrous sulfate  325 mg Oral Daily    heparin (porcine)  5,000 Units Subcutaneous Q12H    hydrALAZINE  50 mg Oral TID    insulin detemir  8 Units Subcutaneous QHS    iron sucrose (VENOFER) IVPB  100 mg Intravenous Every Mon, Wed, Fri    metOLazone  10 mg Oral Daily    rosuvastatin  40 mg Oral Daily    sodium chloride 0.9%  3 mL Intravenous Q8H    trazodone  50 mg Oral QHS    vitamin renal formula (B-complex-vitamin c-folic acid)  1 capsule Oral Daily        PRN:  dextrose 50%, dextrose 50%, glucagon (human  recombinant), glucose, glucose, flu vac [id number] 36mos up(PF), pneumoc 13-eugenio conj-dip cr(PF)    Antibiotics and Day Number of Therapy:  none    Lines and Day Number of Therapy:  PIV x2 20G, 22G    Assessment:     Ambrocio Chin is a 66 y.o.male with  Patient Active Problem List    Diagnosis Date Noted    Congestive heart failure 01/03/2017    Hyperkalemia 01/03/2017    Long term (current) use of anticoagulants 12/05/2016    Thrombus 12/05/2016    Aspiration pneumonia of left lower lobe 11/18/2016    Left-sided cerebrovascular accident (CVA) 11/18/2016    Benign essential HTN 11/18/2016    Fever 11/17/2016    Oropharyngeal dysphagia 11/17/2016    Iron deficiency anemia 11/16/2016    Left pontine stroke 11/16/2016    Stenosis of left carotid artery     Hypertension 11/15/2016    Type 2 diabetes mellitus with hyperglycemia, with long-term current use of insulin 11/15/2016    Hyperlipidemia 11/15/2016    CKD stage 3 due to type 2 diabetes mellitus 11/15/2016    JANEL (acute kidney injury) 11/15/2016    PVD (peripheral vascular disease) with claudication 11/15/2016    Acute on chronic systolic heart failure 11/15/2016    Chronic pulmonary edema 11/15/2016        Plan:     65yo [place] speaking M w/ PMH HFrEF, L pontine infarct on 11/15/16 s/p inpatient rehab discharged 12/2/16, LV thrombus on warfarin, HTN, DM2, HLD, CKD4, diabetic retinopathy with complaint of SOB x3 days.      Fluid Overload in setting of HFrEF and CKD4  - Patient grossly fluid overloaded. SOB x 3 days, 10lb weight gain over past 2 weeks  - 4+ pitting edema to thighs and on hands bilaterally, JVP 15cm  - 1/3/16 CXR: cardiomegaly and pulmonary vascular congestion along with pulmonary edema and pleural effusions consistent w/ CHF  - 11/16/16 TTE: LV EF 35%   - 1/5/16 TTE: LV EF 65%, diastolic dysfunction, Pulm HTN PA Sys Pr 48mmHg (EF improved from previous)  - At admission, BNP 1670. Troponin x2 and EKG neg for ischemia  - Hold home  Coreg in setting acute HFrEF exacerbation. Hold home lisinopril in setting of JANEL  - Metolazone 10mg PO x1 in ED  - As per nephrology, Continue Bumex GTT for 1 more day. Tomorrow, switch Lasix 80mg PO TID. Patient starting to have contraction alkalosis HCO3- 33.  - Continue to monitor I&O and daily weightsCurrently, Net I&O -10.8L since admission  - Continue to follow nephrology recommendations: holding off on dialysis for now, will need AV fistula as outpatient      JANEL on CKD4  - Likely secondary to cardio-renal syndrome due to volume overload secondary to HFrEF  - At admission, Cr 4.1 (baseline Cr 2.6-2.9). Cr today 3.8   - Hold home lisinopril in setting of JANEL  - 1/3/16 Renal US: Bilateral echogenic kidneys, compatible with medical renal disease. Increased resistive index involving the right kidney.  - Protein:Cr Ratio: 2.75, FeNa = 3.6% (likely intrinsic renal cause)  - 1/4/17 SPEP: elevated Lambda and Kappa Free Light Chains: K =14.6, L = 9.18.  - 1/3/16 24hr Cr Clearance: 19.5 (low). 24hr Urine Protien: 45 (high). 24hr Urine Microalb: 850 (high)  - F/u urine labs   - Nephrology consulted, holding dialysis for now  - Continue diuresis      Hyperkalemia, resolved  - On admission, K 6.0 (previously 12/16/16 K 5.7)  - No EKG changes, no peaked T-waves, no ST-changes, no widening of QRS  - Ca Gluconate, Albuterol Neb, Kayexalate given in ED  - Currently, K 4.3  - Continue on Bumex GTT      LV Thrombus, resolved  - 11/17/16 TTE: small round apical thrombus attached to the lateral part of the apex.  - Patient previously started on Warfarin, concern for medication non-compliance  - At admission, sub-theraputic INR 1.1  - 1/5/16 TTE: There is no evidence of intracavity mass, thrombi, or vegetation.   - Discontinue home coumadin       Hx L pontine CVA  - 11/15/16 admitted for L pontine infarct, likely source was LV Thrombus  - S/p inpatient rehab discharged 12/2/16  - Stable mild right sided deficits, mild dysarthria,  no acute issues  - Continue home aspirin, plavix, hold coumadin for now for possible line placement      Palpitiaions  - S/p loop Recorder implanted 11/17/16  - Currently no acute issues, will monitor on telemetry  - F/u outpatient      HTN  - On admission, /77 - now improved  - Continue on home amlodipine, clonidine 0.1mg/24hr patch, hydralazine TID   - Hold home coreg due to acute HFrEF decompensation       Normocytic Anemia  - At admission H/H 8.2/26.3 MCV 94  - Likely secondary to iron deficiency and CKD  - 11/16/16 Iron <10, Ferritin 546, B12 and Folate WNL  - Continue Venifer 3x per wk  - Continue home ferrous sulfate/colace      DM2 w/ neuropathy, nephropathy, retinopathy  - 11/15/16 A1C 10.5  - home dose insulin Detemir 12U + SSI  - glucose low this AM, decrease to 8U nightly while in house      HLD  - 11/15/16 Lipid Panel: TChol 183, , HDL 27  - Continue home rosuvastatin      Anxiety/Depression  - Stable, denies SI/HI  - Continue home citalopram and trazodone qHS      DVT PPx: Heparin  Diet: Renal  Code: Full      Disposition: pending clinical improvement    Justyn Albarran  [facility] Internal Medicine HO-I  [facility] Internal Medicine Service Team B    [facility] Medicine Hospitalist Pager numbers:   [facility] Hospitalist Medicine Team A (Patsy/Nando): [phone]  [facility] Hospitalist Medicine Team B (Lukas/Martha):  [phone]

## 2017-01-07 NOTE — PROGRESS NOTES
Progress Note  Nephrology      Consult Requested By: Jayy Gaytan MD  Reason for Consult: JANEL on CKd 4    SUBJECTIVE:     Review of Systems   Constitutional: Negative for chills and fever.   Respiratory: Negative for cough and shortness of breath.    Cardiovascular: Negative for chest pain and leg swelling.   Gastrointestinal: Negative for nausea.     Patient Active Problem List   Diagnosis    Hypertension    Type 2 diabetes mellitus with hyperglycemia, with long-term current use of insulin    Hyperlipidemia    CKD stage 3 due to type 2 diabetes mellitus    JANEL (acute kidney injury)    PVD (peripheral vascular disease) with claudication    Acute on chronic systolic heart failure    Chronic pulmonary edema    Stenosis of left carotid artery    Iron deficiency anemia    Left pontine stroke    Fever    Oropharyngeal dysphagia    Aspiration pneumonia of left lower lobe    Left-sided cerebrovascular accident (CVA)    Benign essential HTN    Long term (current) use of anticoagulants    Thrombus    Congestive heart failure    Hyperkalemia       OBJECTIVE:     Medications:   amlodipine  10 mg Oral QHS    aspirin  81 mg Oral Daily    carvedilol  3.125 mg Oral BID    citalopram  20 mg Oral Daily    cloNIDine 0.1 mg/24 hr td ptwk  1 patch Transdermal Q7 Days    epoetin reynaldo (PROCRIT) injection  20,000 Units Subcutaneous Q7 Days    ferrous sulfate  325 mg Oral Daily    furosemide  120 mg Oral BID    heparin (porcine)  5,000 Units Subcutaneous Q12H    hydrALAZINE  50 mg Oral TID    insulin detemir  8 Units Subcutaneous QHS    iron sucrose (VENOFER) IVPB  100 mg Intravenous Every Mon, Wed, Fri    metOLazone  10 mg Oral Daily    rosuvastatin  40 mg Oral Daily    sodium chloride 0.9%  3 mL Intravenous Q8H    trazodone  50 mg Oral QHS    vitamin renal formula (B-complex-vitamin c-folic acid)  1 capsule Oral Daily        Vitals:    01/07/17 0800   BP: 118/61   Pulse: 77   Resp: 18   Temp: 98.3  °F (36.8 °C)     I/O last 3 completed shifts:  In: 1079 [P.O.:738; I.V.:241; IV Piggyback:100]  Out: 6700 [Urine:6700]  Physical Exam   Constitutional: He is oriented to person, place, and time. He appears well-developed and well-nourished. No distress.   HENT:   Head: Normocephalic and atraumatic.   Eyes: EOM are normal. No scleral icterus.   Cardiovascular: Normal rate, regular rhythm, normal heart sounds and intact distal pulses.  Exam reveals no gallop and no friction rub.    No murmur heard.  Pulmonary/Chest: Effort normal. He has no wheezes. He has rales (BLL).   Abdominal: Soft. Bowel sounds are normal. He exhibits no distension. There is no tenderness.   Musculoskeletal: Normal range of motion. He exhibits edema (4+ pittng BLE).   Lymphadenopathy:     He has no cervical adenopathy.   Neurological: He is alert and oriented to person, place, and time.   Skin: Skin is warm and dry. No rash noted. He is not diaphoretic.   Psychiatric: Thought content normal.     Laboratory:    Recent Labs  Lab 01/05/17  0337 01/06/17  0316 01/07/17  0348   WBC 5.23 5.54 5.28   HGB 8.0* 8.4* 8.0*   HCT 24.7* 26.3* 25.4*    278 293   MONO 10.7  0.6 9.2  0.5 9.7  0.5       Recent Labs  Lab 01/04/17  0615 01/05/17  0337 01/06/17 0316 01/07/17  0348    137 135* 137  137   K 5.1 4.3 4.3 3.9  3.9    104 99 97  97   CO2 25 26 29 33*  33*   BUN 76* 69* 66* 66*  66*   CREATININE 3.8* 3.8* 3.8* 3.6*  3.6*   CALCIUM 8.7 8.2* 8.1* 8.3*  8.3*   PHOS 5.3*  --  5.8* 5.3*     Labs reviewed  Diagnostic Results:  X-Ray: Reviewed  US: Reviewed  Echo: Reviewed      ASSESSMENT/PLAN:   1. JANEL on CKD 4 - cr stabilized at 3.6. GFR 17  UO 4075 cc/hour--> cont bumex gtt and metolazone  2. HTN - yesterday 1 episode of 173/80, but then few hours later 100/49. Titrate coreg up and wean off clonidine  3. MBD - check PTh and vit D  4. Anemia - check Ferritin     1/3/2017 16:51   Iron 39 (L)   TIBC 275   Saturated Iron 14 (L)    Transferrin 186 (L)       5. EF was 35% 1 month ago but now improved to 65  6. Uncontrolled DM2      Thank you for allowing me to participate in the care of your patients  With any question please call [phone]  Ingris Prescott    [facility]  MATT Schmitz MD, FACCASEY ROBERTSON MD,   MD HEIDI Lira, NP  [address]  [place], [address]  [phone]

## 2017-01-08 LAB
25(OH)D3+25(OH)D2 SERPL-MCNC: 17 NG/ML
ANION GAP SERPL CALC-SCNC: 6 MMOL/L
BASOPHILS # BLD AUTO: 0.01 K/UL
BASOPHILS NFR BLD: 0.2 %
BUN SERPL-MCNC: 67 MG/DL
CALCIUM SERPL-MCNC: 8.5 MG/DL
CHLORIDE SERPL-SCNC: 92 MMOL/L
CO2 SERPL-SCNC: 37 MMOL/L
CREAT SERPL-MCNC: 3.7 MG/DL
DIFFERENTIAL METHOD: ABNORMAL
EOSINOPHIL # BLD AUTO: 0.2 K/UL
EOSINOPHIL NFR BLD: 3.4 %
ERYTHROCYTE [DISTWIDTH] IN BLOOD BY AUTOMATED COUNT: 15.6 %
EST. GFR  (AFRICAN AMERICAN): 19 ML/MIN/1.73 M^2
EST. GFR  (NON AFRICAN AMERICAN): 16 ML/MIN/1.73 M^2
FERRITIN SERPL-MCNC: 231 NG/ML
GLUCOSE SERPL-MCNC: 109 MG/DL
HCT VFR BLD AUTO: 25.6 %
HGB BLD-MCNC: 8.3 G/DL
LYMPHOCYTES # BLD AUTO: 0.5 K/UL
LYMPHOCYTES NFR BLD: 10.3 %
MAGNESIUM SERPL-MCNC: 2.2 MG/DL
MCH RBC QN AUTO: 29.3 PG
MCHC RBC AUTO-ENTMCNC: 32.4 %
MCV RBC AUTO: 91 FL
MONOCYTES # BLD AUTO: 0.6 K/UL
MONOCYTES NFR BLD: 12 %
NEUTROPHILS # BLD AUTO: 3.9 K/UL
NEUTROPHILS NFR BLD: 74.1 %
PHOSPHATE SERPL-MCNC: 5.3 MG/DL
PLATELET # BLD AUTO: 252 K/UL
PMV BLD AUTO: 8.2 FL
POCT GLUCOSE: 136 MG/DL (ref 70–110)
POCT GLUCOSE: 155 MG/DL (ref 70–110)
POCT GLUCOSE: 249 MG/DL (ref 70–110)
POCT GLUCOSE: 90 MG/DL (ref 70–110)
POTASSIUM SERPL-SCNC: 3.7 MMOL/L
PREALB SERPL-MCNC: 18 MG/DL
PTH-INTACT SERPL-MCNC: 96 PG/ML
RBC # BLD AUTO: 2.83 M/UL
SODIUM SERPL-SCNC: 135 MMOL/L
WBC # BLD AUTO: 5.24 K/UL

## 2017-01-08 PROCEDURE — 83735 ASSAY OF MAGNESIUM: CPT

## 2017-01-08 PROCEDURE — 82306 VITAMIN D 25 HYDROXY: CPT

## 2017-01-08 PROCEDURE — 82728 ASSAY OF FERRITIN: CPT

## 2017-01-08 PROCEDURE — 25000003 PHARM REV CODE 250: Performed by: INTERNAL MEDICINE

## 2017-01-08 PROCEDURE — 11000001 HC ACUTE MED/SURG PRIVATE ROOM

## 2017-01-08 PROCEDURE — 84134 ASSAY OF PREALBUMIN: CPT

## 2017-01-08 PROCEDURE — 80048 BASIC METABOLIC PNL TOTAL CA: CPT

## 2017-01-08 PROCEDURE — 84100 ASSAY OF PHOSPHORUS: CPT

## 2017-01-08 PROCEDURE — 63600175 PHARM REV CODE 636 W HCPCS: Performed by: INTERNAL MEDICINE

## 2017-01-08 PROCEDURE — 83970 ASSAY OF PARATHORMONE: CPT

## 2017-01-08 PROCEDURE — 85025 COMPLETE CBC W/AUTO DIFF WBC: CPT

## 2017-01-08 PROCEDURE — 94761 N-INVAS EAR/PLS OXIMETRY MLT: CPT

## 2017-01-08 PROCEDURE — 36415 COLL VENOUS BLD VENIPUNCTURE: CPT

## 2017-01-08 PROCEDURE — 25000003 PHARM REV CODE 250: Performed by: STUDENT IN AN ORGANIZED HEALTH CARE EDUCATION/TRAINING PROGRAM

## 2017-01-08 RX ORDER — ERGOCALCIFEROL 1.25 MG/1
50000 CAPSULE ORAL
Status: DISCONTINUED | OUTPATIENT
Start: 2017-01-08 | End: 2017-01-09 | Stop reason: HOSPADM

## 2017-01-08 RX ORDER — FUROSEMIDE 40 MG/1
80 TABLET ORAL EVERY 8 HOURS
Status: DISCONTINUED | OUTPATIENT
Start: 2017-01-08 | End: 2017-01-09 | Stop reason: HOSPADM

## 2017-01-08 RX ADMIN — HEPARIN SODIUM 5000 UNITS: 5000 INJECTION, SOLUTION INTRAVENOUS; SUBCUTANEOUS at 10:01

## 2017-01-08 RX ADMIN — SODIUM CHLORIDE, PRESERVATIVE FREE 3 ML: 5 INJECTION INTRAVENOUS at 02:01

## 2017-01-08 RX ADMIN — CARVEDILOL 6.25 MG: 6.25 TABLET, FILM COATED ORAL at 10:01

## 2017-01-08 RX ADMIN — CARVEDILOL 6.25 MG: 6.25 TABLET, FILM COATED ORAL at 09:01

## 2017-01-08 RX ADMIN — HYDRALAZINE HYDROCHLORIDE 50 MG: 25 TABLET ORAL at 10:01

## 2017-01-08 RX ADMIN — ROSUVASTATIN CALCIUM 40 MG: 10 TABLET, FILM COATED ORAL at 09:01

## 2017-01-08 RX ADMIN — SODIUM CHLORIDE, PRESERVATIVE FREE 3 ML: 5 INJECTION INTRAVENOUS at 05:01

## 2017-01-08 RX ADMIN — METOLAZONE 10 MG: 5 TABLET ORAL at 09:01

## 2017-01-08 RX ADMIN — FUROSEMIDE 80 MG: 40 TABLET ORAL at 02:01

## 2017-01-08 RX ADMIN — ERGOCALCIFEROL 50000 UNITS: 1.25 CAPSULE ORAL at 09:01

## 2017-01-08 RX ADMIN — INSULIN ASPART 2 UNITS: 100 INJECTION, SOLUTION INTRAVENOUS; SUBCUTANEOUS at 10:01

## 2017-01-08 RX ADMIN — HYDRALAZINE HYDROCHLORIDE 50 MG: 25 TABLET ORAL at 02:01

## 2017-01-08 RX ADMIN — HYDRALAZINE HYDROCHLORIDE 50 MG: 25 TABLET ORAL at 05:01

## 2017-01-08 RX ADMIN — SODIUM CHLORIDE, PRESERVATIVE FREE 3 ML: 5 INJECTION INTRAVENOUS at 10:01

## 2017-01-08 RX ADMIN — Medication 1 CAPSULE: at 09:01

## 2017-01-08 RX ADMIN — AMLODIPINE BESYLATE 10 MG: 5 TABLET ORAL at 10:01

## 2017-01-08 RX ADMIN — FERROUS SULFATE TAB EC 325 MG (65 MG FE EQUIVALENT) 325 MG: 325 (65 FE) TABLET DELAYED RESPONSE at 09:01

## 2017-01-08 RX ADMIN — IRON SUCROSE 100 MG: 20 INJECTION, SOLUTION INTRAVENOUS at 11:01

## 2017-01-08 RX ADMIN — HEPARIN SODIUM 5000 UNITS: 5000 INJECTION, SOLUTION INTRAVENOUS; SUBCUTANEOUS at 09:01

## 2017-01-08 RX ADMIN — TRAZODONE HYDROCHLORIDE 50 MG: 50 TABLET ORAL at 10:01

## 2017-01-08 RX ADMIN — BUMETANIDE 1 MG/HR: 0.25 INJECTION INTRAMUSCULAR; INTRAVENOUS at 04:01

## 2017-01-08 RX ADMIN — FUROSEMIDE 80 MG: 40 TABLET ORAL at 10:01

## 2017-01-08 RX ADMIN — CITALOPRAM HYDROBROMIDE 20 MG: 20 TABLET ORAL at 09:01

## 2017-01-08 RX ADMIN — ASPIRIN 81 MG: 81 TABLET, COATED ORAL at 09:01

## 2017-01-08 NOTE — PROGRESS NOTES
Progress Note  Nephrology      Consult Requested By: Jayy Gaytan MD  Reason for Consult: JANEL on CKd 4    SUBJECTIVE:     Review of Systems   Constitutional: Negative for chills and fever.   Respiratory: Negative for cough and shortness of breath.    Cardiovascular: Negative for chest pain and leg swelling.   Gastrointestinal: Negative for nausea.     Patient Active Problem List   Diagnosis    Hypertension    Type 2 diabetes mellitus with hyperglycemia, with long-term current use of insulin    Hyperlipidemia    CKD stage 3 due to type 2 diabetes mellitus    JANEL (acute kidney injury)    PVD (peripheral vascular disease) with claudication    Acute on chronic systolic heart failure    Chronic pulmonary edema    Stenosis of left carotid artery    Iron deficiency anemia    Left pontine stroke    Fever    Oropharyngeal dysphagia    Aspiration pneumonia of left lower lobe    Left-sided cerebrovascular accident (CVA)    Benign essential HTN    Long term (current) use of anticoagulants    Thrombus    Congestive heart failure    Hyperkalemia       OBJECTIVE:     Medications:   amlodipine  10 mg Oral QHS    aspirin  81 mg Oral Daily    carvedilol  6.25 mg Oral BID    citalopram  20 mg Oral Daily    cloNIDine 0.1 mg/24 hr td ptwk  1 patch Transdermal Q7 Days    epoetin reynaldo (PROCRIT) injection  20,000 Units Subcutaneous Q7 Days    ergocalciferol  50,000 Units Oral Q7 Days    ferrous sulfate  325 mg Oral Daily    heparin (porcine)  5,000 Units Subcutaneous Q12H    hydrALAZINE  50 mg Oral TID    insulin detemir  8 Units Subcutaneous QHS    iron sucrose (VENOFER) IVPB  100 mg Intravenous Every Mon, Wed, Fri    metOLazone  10 mg Oral Daily    rosuvastatin  40 mg Oral Daily    sodium chloride 0.9%  3 mL Intravenous Q8H    trazodone  50 mg Oral QHS    vitamin renal formula (B-complex-vitamin c-folic acid)  1 capsule Oral Daily      bumetanide (BUMEX) infusion 1 mg/hr (01/08/17 8999)     Vitals:     01/08/17 0745   BP: 123/63   Pulse: 75   Resp: 16   Temp: 98.5 °F (36.9 °C)     I/O last 3 completed shifts:  In: 1175 [P.O.:1050; I.V.:125]  Out: 6625 [Urine:6625]  Physical Exam   Constitutional: He is oriented to person, place, and time. He appears well-developed and well-nourished. No distress.   HENT:   Head: Normocephalic and atraumatic.   Eyes: EOM are normal. No scleral icterus.   Cardiovascular: Normal rate, regular rhythm, normal heart sounds and intact distal pulses.  Exam reveals no gallop and no friction rub.    No murmur heard.  Pulmonary/Chest: Effort normal. He has no wheezes. He has rales (BLL).   Abdominal: Soft. Bowel sounds are normal. He exhibits no distension. There is no tenderness.   Musculoskeletal: Normal range of motion. He exhibits edema (4+ pittng BLE).   Lymphadenopathy:     He has no cervical adenopathy.   Neurological: He is alert and oriented to person, place, and time.   Skin: Skin is warm and dry. No rash noted. He is not diaphoretic.   Psychiatric: Thought content normal.     Laboratory:    Recent Labs  Lab 01/06/17  0316 01/07/17  0348 01/08/17  0342   WBC 5.54 5.28 5.24   HGB 8.4* 8.0* 8.3*   HCT 26.3* 25.4* 25.6*    293 252   MONO 9.2  0.5 9.7  0.5 12.0  0.6       Recent Labs  Lab 01/06/17  0316 01/07/17  0348 01/08/17  0342   * 137  137 135*   K 4.3 3.9  3.9 3.7   CL 99 97  97 92*   CO2 29 33*  33* 37*   BUN 66* 66*  66* 67*   CREATININE 3.8* 3.6*  3.6* 3.7*   CALCIUM 8.1* 8.3*  8.3* 8.5*   PHOS 5.8* 5.3* 5.3*     Labs reviewed  Diagnostic Results:  X-Ray: Reviewed  US: Reviewed  Echo: Reviewed      ASSESSMENT/PLAN:   1. JANEL on CKD 4 - cr stabilized at 3.6. GFR 17  UO 4500 cc/ 24hour--> cont metolazone, stop bumex gtt and transition to lasix 80 PO TID  2. HTN -  Titrate coreg up, add low dose lisinopril and wean off clonidine  3. MBD -  PTh 96 and vit D 17  4. Anemia -      1/3/2017 16:51   Iron 39 (L)   TIBC 275   Saturated Iron 14 (L)   Transferrin  186 (L)   Ferritin   231    Start Venofer    5. EF was 35% 1 month ago but now improved to 65  6. Uncontrolled DM2 - hb A1C>10  7. Proteinuria  2 g on 24 hours urine/ UPCR- 2.75 -  hep panel negative      Ref. Range 1/4/2017 13:19   [place] Free Light Chains Latest Ref Range: 0.33 - 1.94 mg/dL 14.67 (H)   Lambda Free Light Chains Latest Ref Range: 0.57 - 2.63 mg/dL 9.18 (H)   Kappa/Lambda FLC Ratio Latest Ref Range: 0.26 - 1.65  1.60       Thank you for allowing me to participate in the care of your patients  With any question please call [phone]  Ingris Prescott    [facility]  MATT Schmitz MD, CASEY MARRERO MD,   MD HEIDI Lira, NP  [address]  [place], [address] [phone]

## 2017-01-08 NOTE — PROGRESS NOTES
[facility] Internal Medicine Resident NICKI Progress Note    Subjective:      Patient states he feels better this morning, denies SOB, cough, fevers, chills, abdominal pain.     Objective:   Last 24 Hour Vital Signs:  BP  Min: 107/62  Max: 146/72  Temp  Av °F (36.7 °C)  Min: 97.6 °F (36.4 °C)  Max: 98.4 °F (36.9 °C)  Pulse  Av.5  Min: 70  Max: 85  Resp  Av.7  Min: 16  Max: 19  SpO2  Av.8 %  Min: 92 %  Max: 97 %  I/O last 3 completed shifts:  In:  [P.O.:1670; I.V.:221; IV Piggyback:100]  Out: 6675 [Urine:6675]    Physical Examination:  Last 24 Hour Vital Signs:  BP  Min: 107/62  Max: 146/72  Temp  Av °F (36.7 °C)  Min: 97.6 °F (36.4 °C)  Max: 98.4 °F (36.9 °C)  Pulse  Av.5  Min: 70  Max: 85  Resp  Av.7  Min: 16  Max: 19  SpO2  Av.8 %  Min: 92 %  Max: 97 %  Body mass index is 35.77 kg/(m^2).  I/O last 3 completed shifts:  In:  [P.O.:1670; I.V.:221; IV Piggyback:100]  Out: 6675 [Urine:6675]    General:   Alert and awake in no apparent distress  Head:    Normocephalic and atraumatic  Eyes:    PERRL; EOMi with anicteric sclera and clear conjunctivae  Mouth:   Oropharynx clear and without exudate; moist mucous membranes  Cardio:   Regular rate and rhythm with normal S1 and S2; no murmurs or rubs  Resp:    CTAB; respirations unlabored; mild rhonchi at bases  Abdom:   Soft, NTND with normoactive bowel sounds  Extrem:   Warm and well-perfused with no clubbing, 1+ pitting edema to thighs and on hands bilatteraly  Skin:     No rashes, lesions, or color changes  Pulses:   2+ and symmetric distally  Neuro:   AAOx3; cooperative and pleasant with no focal deficits    Laboratory:  Laboratory Data  Pertinent Findings:  Recent Labs      17   0316  17   0348  17   0342   WBC  5.54  5.28  5.24   HGB  8.4*  8.0*  8.3*   HCT  26.3*  25.4*  25.6*   PLT  278  293  252   MCV  91  93  91   RDW  16.0*  15.6*  15.6*   GRAN  78.5*  4.4  75.5*  4.0  74.1*  3.9   LYMPH  7.9*  0.4*   10.2*  0.5*  10.3*  0.5*   MONO  9.2  0.5  9.7  0.5  12.0  0.6   EOS  0.2  0.2  0.2   BASO  0.02  0.03  0.01   EOSINOPHIL  3.8  4.0  3.4   BASOPHIL  0.4  0.6  0.2       Recent Labs      01/06/17   0316  01/07/17   0348  01/08/17   0342   NA  135*  137  137  135*   K  4.3  3.9  3.9  3.7   CL  99  97  97  92*   CO2  29  33*  33*  37*   BUN  66*  66*  66*  67*   CREATININE  3.8*  3.6*  3.6*  3.7*     Recent Labs      01/07/17   0524  01/07/17   1134  01/07/17   1631  01/07/17 2014 01/08/17   0454   POCTGLUCOSE  99  159*  149*  198*  90     No results for input(s): PROT, ALBUMIN, BILITOT, AST, ALT, ALKPHOS in the last 72 hours.    Microbiology Data  Pertinent Findings:  No new data    Other Results:  EKG (my interpretation): no new ekg    Radiology Data   Pertinent Findings (my interpretation):  No new imaging    Current Medications:     Infusions:   bumetanide (BUMEX) infusion 1 mg/hr (01/08/17 0417)        Scheduled:   amlodipine  10 mg Oral QHS    aspirin  81 mg Oral Daily    carvedilol  6.25 mg Oral BID    citalopram  20 mg Oral Daily    cloNIDine 0.1 mg/24 hr td ptwk  1 patch Transdermal Q7 Days    epoetin reynaldo (PROCRIT) injection  20,000 Units Subcutaneous Q7 Days    ferrous sulfate  325 mg Oral Daily    heparin (porcine)  5,000 Units Subcutaneous Q12H    hydrALAZINE  50 mg Oral TID    insulin detemir  8 Units Subcutaneous QHS    iron sucrose (VENOFER) IVPB  100 mg Intravenous Every Mon, Wed, Fri    metOLazone  10 mg Oral Daily    rosuvastatin  40 mg Oral Daily    sodium chloride 0.9%  3 mL Intravenous Q8H    trazodone  50 mg Oral QHS    vitamin renal formula (B-complex-vitamin c-folic acid)  1 capsule Oral Daily        PRN:  dextrose 50%, dextrose 50%, glucagon (human recombinant), glucose, glucose, flu vac [id number] 36mos up(PF), insulin aspart, pneumoc 13-eugenio conj-dip cr(PF)    Antibiotics and Day Number of Therapy:  None    Lines and Day Number of Therapy:  PIV x2 20G,  22G    Assessment:     Ambrocio Chin is a 66 y.o.male with  Patient Active Problem List    Diagnosis Date Noted    Congestive heart failure 01/03/2017    Hyperkalemia 01/03/2017    Long term (current) use of anticoagulants 12/05/2016    Thrombus 12/05/2016    Aspiration pneumonia of left lower lobe 11/18/2016    Left-sided cerebrovascular accident (CVA) 11/18/2016    Benign essential HTN 11/18/2016    Fever 11/17/2016    Oropharyngeal dysphagia 11/17/2016    Iron deficiency anemia 11/16/2016    Left pontine stroke 11/16/2016    Stenosis of left carotid artery     Hypertension 11/15/2016    Type 2 diabetes mellitus with hyperglycemia, with long-term current use of insulin 11/15/2016    Hyperlipidemia 11/15/2016    CKD stage 3 due to type 2 diabetes mellitus 11/15/2016    JANEL (acute kidney injury) 11/15/2016    PVD (peripheral vascular disease) with claudication 11/15/2016    Acute on chronic systolic heart failure 11/15/2016    Chronic pulmonary edema 11/15/2016        Plan:     65yo [place] Ze[place]ander speaking M w/ PMH HFrEF, L pontine infarct on 11/15/16 s/p inpatient rehab discharged 12/2/16, LV thrombus on warfarin, HTN, DM2, HLD, CKD4, diabetic retinopathy with complaint of SOB x3 days.      Fluid Overload in setting of HFrEF and CKD4  - Patient grossly fluid overloaded. SOB x 3 days, 10lb weight gain over past 2 weeks  - 4+ pitting edema to thighs and on hands bilaterally, JVP 15cm  - 1/3/16 CXR: cardiomegaly and pulmonary vascular congestion along with pulmonary edema and pleural effusions consistent w/ CHF  - 11/16/16 TTE: LV EF 35%   - 1/5/16 TTE: LV EF 65%, diastolic dysfunction, Pulm HTN PA Sys Pr 48mmHg (EF improved from previous)  - At admission, BNP 1670. Troponin x2 and EKG neg for ischemia  - Hold home Coreg in setting acute HFrEF exacerbation. Hold home lisinopril in setting of JANEL  - Metolazone 10mg PO x1 in ED  - As per nephrology, Today will discontinue Bumex GTT and switch to Lasix 80mg  PO TID. Patient starting to have contraction alkalosis HCO3- 37, Cr stable at baseline.  - Continue to monitor I&O and daily weights. Currently, Net I&O -14.1L since admission  - Continue to follow nephrology recommendations: holding off on dialysis for now, will need AV fistula as outpatient      JANEL on CKD4  - Likely secondary to cardio-renal syndrome due to volume overload secondary to HFrEF  - At admission, Cr 4.1 (baseline Cr 2.6-2.9). Cr today 3.7   - Hold home lisinopril in setting of JANEL  - 1/3/16 Renal US: Bilateral echogenic kidneys, compatible with medical renal disease. Increased resistive index involving the right kidney.  - Protein:Cr Ratio: 2.75, FeNa = 3.6% (likely intrinsic renal cause)  - 1/4/17 SPEP: elevated Lambda and Kappa Free Light Chains: K =14.6, L = 9.18.  - 1/3/16 24hr Cr Clearance: 19.5 (low). 24hr Urine Protien: 45 (high). 24hr Urine Microalb: 850 (high)  - FeNa = 3.7% (Intrinsic Renal)  - Nephrology consulted, holding dialysis for now  - Continue diuresis      Hyperkalemia, resolved  - On admission, K 6.0 (previously 12/16/16 K 5.7)  - No EKG changes, no peaked T-waves, no ST-changes, no widening of QRS  - Ca Gluconate, Albuterol Neb, Kayexalate given in ED  - Currently, K 3.7  - Continue on Bumex GTT      LV Thrombus, resolved  - 11/17/16 TTE: small round apical thrombus attached to the lateral part of the apex.  - Patient previously started on Warfarin, concern for medication non-compliance  - At admission, sub-theraputic INR 1.1  - 1/5/16 TTE: There is no evidence of intracavity mass, thrombi, or vegetation.   - Discontinue home coumadin       Hx L pontine CVA  - 11/15/16 admitted for L pontine infarct, likely source was LV Thrombus  - S/p inpatient rehab discharged 12/2/16  - Stable mild right sided deficits, mild dysarthria, no acute issues  - Continue home aspirin, plavix, hold coumadin for now for possible line placement      Palpitiaions  - S/p loop Recorder implanted  11/17/16  - Currently no acute issues, will monitor on telemetry  - F/u outpatient      HTN  - On admission, /77 - now improved  - Continue on home amlodipine, clonidine 0.1mg/24hr patch, hydralazine TID   - Hold home coreg due to acute HFrEF decompensation       Normocytic Anemia  - At admission H/H 8.2/26.3 MCV 94  - Likely secondary to iron deficiency and CKD  - 11/16/16 Iron <10, Ferritin 546, B12 and Folate WNL  - Continue Venifer 3x per wk  - Continue home ferrous sulfate/colace      DM2 w/ neuropathy, nephropathy, retinopathy  - 11/15/16 A1C 10.5  - home dose insulin Detemir 12U + SSI  - glucose low this AM, decrease to 8U nightly while in house      HLD  - 11/15/16 Lipid Panel: TChol 183, , HDL 27  - Continue home rosuvastatin      Anxiety/Depression  - Stable, denies SI/HI  - Continue home citalopram and trazodone qHS      DVT PPx: Heparin  Diet: Renal  Code: Full      Disposition: pending clinical improvement       Justyn Albarran  [facility] Internal Medicine HO-I  [facility] Internal Medicine Service Team B    [facility] Medicine Hospitalist Pager numbers:   [facility] Hospitalist Medicine Team A (Patsy/Nando): [phone]  [facility] Hospitalist Medicine Team B (Lukas/Martha):  [phone]

## 2017-01-08 NOTE — PLAN OF CARE
Problem: Patient Care Overview  Goal: Plan of Care Review  Outcome: Ongoing (interventions implemented as appropriate)  Pt on RA, SPO2  93%.  Pt in no apparent respiratory distress. Will continue to monitor.

## 2017-01-08 NOTE — PLAN OF CARE
Problem: Patient Care Overview  Goal: Plan of Care Review  Room air SpO2  96 %. Pt with no apparent distress noted. Will continue to monitor.

## 2017-01-08 NOTE — PLAN OF CARE
Problem: Patient Care Overview  Goal: Plan of Care Review  Outcome: Ongoing (interventions implemented as appropriate)  Patient educated on medications including side effects and plan of care. Understanding verbalized. Patient states he is unsure of what he is allowed to eat at home. Dietary consult placed  Telemetry monitor on and audible showing no red alarms. Bed locked and in lowest position. All safety measures maintained with bed alarm on and working. Patient understands to use call light before getting out of bed per self

## 2017-01-09 VITALS
WEIGHT: 190.69 LBS | RESPIRATION RATE: 20 BRPM | HEIGHT: 63 IN | DIASTOLIC BLOOD PRESSURE: 61 MMHG | BODY MASS INDEX: 33.79 KG/M2 | OXYGEN SATURATION: 93 % | HEART RATE: 68 BPM | SYSTOLIC BLOOD PRESSURE: 121 MMHG | TEMPERATURE: 98 F

## 2017-01-09 LAB
ANION GAP SERPL CALC-SCNC: 9 MMOL/L
BASOPHILS # BLD AUTO: 0.02 K/UL
BASOPHILS NFR BLD: 0.3 %
BUN SERPL-MCNC: 66 MG/DL
CALCIUM SERPL-MCNC: 8.3 MG/DL
CHLORIDE SERPL-SCNC: 90 MMOL/L
CO2 SERPL-SCNC: 35 MMOL/L
CREAT SERPL-MCNC: 3.7 MG/DL
DIFFERENTIAL METHOD: ABNORMAL
EOSINOPHIL # BLD AUTO: 0.2 K/UL
EOSINOPHIL NFR BLD: 2.8 %
ERYTHROCYTE [DISTWIDTH] IN BLOOD BY AUTOMATED COUNT: 15.5 %
EST. GFR  (AFRICAN AMERICAN): 19 ML/MIN/1.73 M^2
EST. GFR  (NON AFRICAN AMERICAN): 16 ML/MIN/1.73 M^2
GLUCOSE SERPL-MCNC: 182 MG/DL
HCT VFR BLD AUTO: 25.2 %
HGB BLD-MCNC: 8.3 G/DL
INTERPRETATION UR IFE-IMP: NORMAL
LYMPHOCYTES # BLD AUTO: 0.5 K/UL
LYMPHOCYTES NFR BLD: 9.2 %
MAGNESIUM SERPL-MCNC: 2.2 MG/DL
MCH RBC QN AUTO: 29.5 PG
MCHC RBC AUTO-ENTMCNC: 32.9 %
MCV RBC AUTO: 90 FL
MONOCYTES # BLD AUTO: 0.6 K/UL
MONOCYTES NFR BLD: 10.9 %
NEUTROPHILS # BLD AUTO: 4.4 K/UL
NEUTROPHILS NFR BLD: 76.5 %
PHOSPHATE SERPL-MCNC: 5.4 MG/DL
PLATELET # BLD AUTO: 257 K/UL
PMV BLD AUTO: 8.3 FL
POCT GLUCOSE: 103 MG/DL (ref 70–110)
POCT GLUCOSE: 122 MG/DL (ref 70–110)
POTASSIUM SERPL-SCNC: 3.6 MMOL/L
RBC # BLD AUTO: 2.81 M/UL
SODIUM SERPL-SCNC: 134 MMOL/L
WBC # BLD AUTO: 5.76 K/UL

## 2017-01-09 PROCEDURE — 84100 ASSAY OF PHOSPHORUS: CPT

## 2017-01-09 PROCEDURE — 36415 COLL VENOUS BLD VENIPUNCTURE: CPT

## 2017-01-09 PROCEDURE — 25000003 PHARM REV CODE 250: Performed by: INTERNAL MEDICINE

## 2017-01-09 PROCEDURE — 97116 GAIT TRAINING THERAPY: CPT

## 2017-01-09 PROCEDURE — 80048 BASIC METABOLIC PNL TOTAL CA: CPT

## 2017-01-09 PROCEDURE — 94761 N-INVAS EAR/PLS OXIMETRY MLT: CPT

## 2017-01-09 PROCEDURE — 85025 COMPLETE CBC W/AUTO DIFF WBC: CPT

## 2017-01-09 PROCEDURE — 97802 MEDICAL NUTRITION INDIV IN: CPT

## 2017-01-09 PROCEDURE — 25000003 PHARM REV CODE 250: Performed by: STUDENT IN AN ORGANIZED HEALTH CARE EDUCATION/TRAINING PROGRAM

## 2017-01-09 PROCEDURE — 63600175 PHARM REV CODE 636 W HCPCS: Performed by: INTERNAL MEDICINE

## 2017-01-09 PROCEDURE — 83735 ASSAY OF MAGNESIUM: CPT

## 2017-01-09 RX ORDER — HYDRALAZINE HYDROCHLORIDE 50 MG/1
50 TABLET, FILM COATED ORAL 3 TIMES DAILY
Qty: 90 TABLET | Refills: 3 | Status: ON HOLD | OUTPATIENT
Start: 2017-01-09 | End: 2018-07-09 | Stop reason: HOSPADM

## 2017-01-09 RX ORDER — CARVEDILOL 12.5 MG/1
12.5 TABLET ORAL 2 TIMES DAILY
Status: DISCONTINUED | OUTPATIENT
Start: 2017-01-09 | End: 2017-01-09 | Stop reason: HOSPADM

## 2017-01-09 RX ORDER — CARVEDILOL 12.5 MG/1
12.5 TABLET ORAL 2 TIMES DAILY
Qty: 180 TABLET | Refills: 3 | Status: ON HOLD | OUTPATIENT
Start: 2017-01-09 | End: 2018-07-05

## 2017-01-09 RX ORDER — LISINOPRIL 5 MG/1
10 TABLET ORAL DAILY
Status: DISCONTINUED | OUTPATIENT
Start: 2017-01-09 | End: 2017-01-09

## 2017-01-09 RX ORDER — POTASSIUM CHLORIDE 20 MEQ/1
40 TABLET, EXTENDED RELEASE ORAL
Status: COMPLETED | OUTPATIENT
Start: 2017-01-09 | End: 2017-01-09

## 2017-01-09 RX ORDER — CARVEDILOL 12.5 MG/1
12.5 TABLET ORAL 2 TIMES DAILY
Qty: 180 TABLET | Refills: 3 | Status: SHIPPED | OUTPATIENT
Start: 2017-01-09 | End: 2017-01-09

## 2017-01-09 RX ORDER — LISINOPRIL 40 MG/1
40 TABLET ORAL DAILY
Qty: 90 TABLET | Refills: 0 | OUTPATIENT
Start: 2017-01-09 | End: 2017-01-09

## 2017-01-09 RX ORDER — LISINOPRIL 40 MG/1
40 TABLET ORAL DAILY
Qty: 90 TABLET | Refills: 0 | Status: SHIPPED | OUTPATIENT
Start: 2017-01-09 | End: 2017-01-09

## 2017-01-09 RX ORDER — LANCETS
1 EACH MISCELLANEOUS
Qty: 120 EACH | Refills: 2 | Status: SHIPPED | OUTPATIENT
Start: 2017-01-09 | End: 2017-01-26 | Stop reason: CLARIF

## 2017-01-09 RX ORDER — INSULIN GLARGINE 100 [IU]/ML
12 INJECTION, SOLUTION SUBCUTANEOUS NIGHTLY
Qty: 10.8 ML | Refills: 0 | Status: SHIPPED | OUTPATIENT
Start: 2017-01-09 | End: 2018-07-10

## 2017-01-09 RX ORDER — FUROSEMIDE 80 MG/1
80 TABLET ORAL EVERY 8 HOURS
Qty: 90 TABLET | Refills: 11 | Status: ON HOLD | OUTPATIENT
Start: 2017-01-09 | End: 2018-07-07 | Stop reason: HOSPADM

## 2017-01-09 RX ORDER — AMLODIPINE BESYLATE 10 MG/1
10 TABLET ORAL NIGHTLY
Qty: 90 TABLET | Refills: 3 | Status: ON HOLD | OUTPATIENT
Start: 2017-01-09 | End: 2018-07-05

## 2017-01-09 RX ORDER — CARVEDILOL 6.25 MG/1
6.25 TABLET ORAL 2 TIMES DAILY
Qty: 60 TABLET | Refills: 11 | Status: SHIPPED | OUTPATIENT
Start: 2017-01-09 | End: 2017-01-09 | Stop reason: HOSPADM

## 2017-01-09 RX ORDER — LANCETS
1 EACH MISCELLANEOUS
Qty: 120 EACH | Refills: 2 | OUTPATIENT
Start: 2017-01-09 | End: 2017-01-09

## 2017-01-09 RX ORDER — CARVEDILOL 12.5 MG/1
12.5 TABLET ORAL 2 TIMES DAILY
Qty: 180 TABLET | Refills: 3 | OUTPATIENT
Start: 2017-01-09 | End: 2017-01-09

## 2017-01-09 RX ORDER — LISINOPRIL 40 MG/1
40 TABLET ORAL DAILY
Qty: 90 TABLET | Refills: 0 | Status: ON HOLD | OUTPATIENT
Start: 2017-01-09 | End: 2018-07-07 | Stop reason: HOSPADM

## 2017-01-09 RX ORDER — LANCETS
1 EACH MISCELLANEOUS
Qty: 120 EACH | Refills: 2 | Status: SHIPPED | OUTPATIENT
Start: 2017-01-09 | End: 2017-01-09

## 2017-01-09 RX ORDER — ERGOCALCIFEROL 1.25 MG/1
50000 CAPSULE ORAL
Qty: 8 CAPSULE | Refills: 0 | Status: ON HOLD | OUTPATIENT
Start: 2017-01-09 | End: 2018-07-05

## 2017-01-09 RX ADMIN — SODIUM CHLORIDE, PRESERVATIVE FREE 3 ML: 5 INJECTION INTRAVENOUS at 05:01

## 2017-01-09 RX ADMIN — METOLAZONE 10 MG: 5 TABLET ORAL at 09:01

## 2017-01-09 RX ADMIN — ROSUVASTATIN CALCIUM 40 MG: 10 TABLET, FILM COATED ORAL at 10:01

## 2017-01-09 RX ADMIN — FUROSEMIDE 80 MG: 40 TABLET ORAL at 02:01

## 2017-01-09 RX ADMIN — POTASSIUM CHLORIDE 40 MEQ: 20 TABLET, EXTENDED RELEASE ORAL at 09:01

## 2017-01-09 RX ADMIN — POTASSIUM CHLORIDE 40 MEQ: 20 TABLET, EXTENDED RELEASE ORAL at 02:01

## 2017-01-09 RX ADMIN — CITALOPRAM HYDROBROMIDE 20 MG: 20 TABLET ORAL at 09:01

## 2017-01-09 RX ADMIN — ASPIRIN 81 MG: 81 TABLET, COATED ORAL at 09:01

## 2017-01-09 RX ADMIN — CARVEDILOL 6.25 MG: 6.25 TABLET, FILM COATED ORAL at 09:01

## 2017-01-09 RX ADMIN — HYDRALAZINE HYDROCHLORIDE 50 MG: 25 TABLET ORAL at 02:01

## 2017-01-09 RX ADMIN — IRON SUCROSE 100 MG: 20 INJECTION, SOLUTION INTRAVENOUS at 09:01

## 2017-01-09 RX ADMIN — HEPARIN SODIUM 5000 UNITS: 5000 INJECTION, SOLUTION INTRAVENOUS; SUBCUTANEOUS at 09:01

## 2017-01-09 RX ADMIN — LISINOPRIL 10 MG: 5 TABLET ORAL at 09:01

## 2017-01-09 RX ADMIN — FERROUS SULFATE TAB EC 325 MG (65 MG FE EQUIVALENT) 325 MG: 325 (65 FE) TABLET DELAYED RESPONSE at 09:01

## 2017-01-09 RX ADMIN — SODIUM CHLORIDE, PRESERVATIVE FREE 3 ML: 5 INJECTION INTRAVENOUS at 02:01

## 2017-01-09 RX ADMIN — Medication 1 CAPSULE: at 09:01

## 2017-01-09 RX ADMIN — FUROSEMIDE 80 MG: 40 TABLET ORAL at 05:01

## 2017-01-09 NOTE — PLAN OF CARE
Telemetry Recap:    Patient stable, NSR on heart monitor, no abnormal heart rhythms noted, no red alarms, will continue to monitor.

## 2017-01-09 NOTE — PROGRESS NOTES
DISCHARGE PLANNING NOTE UPDATE  MET WITH PATIENT AT BEDSIDE, PATIENT STATES HE HAS HELP AT HOME, BUT IS ABLE TO DO EVERYTHING FOR HIMSELF. HE STATES HE HAS A FRIEND , HE GETS HIS MEDS AT [facility] AND HE HAS A WALKER AT HOME.  DISCHARGE PLAN IS FOR PATIENT TO GO HOME, WHEN TEAM IS READY  MARCO ESCALERA  CASE MANAGEMENT  [phone]

## 2017-01-09 NOTE — PLAN OF CARE
Pt /56 this AM. Baseline SBP ~120-140, on hydralazine. Will hold AM hydralazine. Will still receive lasix PO. Next hydralazine dose 1400.

## 2017-01-09 NOTE — PLAN OF CARE
Problem: Patient Care Overview  Goal: Plan of Care Review  Outcome: Ongoing (interventions implemented as appropriate)  Plan of care reviewed with pt. Pt verbalizes understanding. Strict i and o's maintained. Bed in lowest position, side rails up times 2, wheels locked, nonslip socks on, and bed alarm on. Call bell w/in reach. Instructed to call for needs/assist oob. No c/o pain or sob t/o night. Pt on telemetry. NSR. No true red alarms/ectopy noted t/o shift. Will continue to monitor.

## 2017-01-09 NOTE — PLAN OF CARE
Patient stable, friend at bedside, Radha in room for Indonesian Interpretation. Patient's iv discontinued tip intact and telemetry removed.  reviewed discharge information, medications, and CHF education with patient. Refer to clinical references for further education. Patient aware of follow- ups, lab work, and prescriptions given.

## 2017-01-09 NOTE — PROGRESS NOTES
Pt's bp 100/56 manually. Pt asymptomatic. Hydralazine 50 mg po and lasix 80 mg po scheduled for 0600. Dr Kiser notified of pt's bp. Instructed by md to give lasix as scheduled and hold 0600 dose of scheduled hydralazine.

## 2017-01-09 NOTE — PLAN OF CARE
Problem: Patient Care Overview  Goal: Plan of Care Review  Outcome: Ongoing (interventions implemented as appropriate)  Pt on RA with sats of 93%.  Will continue to monitor.\

## 2017-01-09 NOTE — PROGRESS NOTES
[facility] Internal Medicine Resident NICKI Progress Note    Subjective:      Patient states he feels well this monring, denies fever, chills, chest pain, sob, abdominal pain, dizziness, lightheadedness.     Objective:   Last 24 Hour Vital Signs:  BP  Min: 100/56  Max: 131/70  Temp  Av.1 °F (36.7 °C)  Min: 97.5 °F (36.4 °C)  Max: 98.5 °F (36.9 °C)  Pulse  Av.6  Min: 64  Max: 80  Resp  Av  Min: 16  Max: 20  SpO2  Av %  Min: 91 %  Max: 94 %  Weight  Av.5 kg (190 lb 11.2 oz)  Min: 86.5 kg (190 lb 11.2 oz)  Max: 86.5 kg (190 lb 11.2 oz)  I/O last 3 completed shifts:  In: 1479.3 [P.O.:1200; I.V.:179.3; IV Piggyback:100]  Out: 4730 [Urine:4730]    Physical Examination:  Last 24 Hour Vital Signs:  BP  Min: 100/56  Max: 131/70  Temp  Av.1 °F (36.7 °C)  Min: 97.5 °F (36.4 °C)  Max: 98.5 °F (36.9 °C)  Pulse  Av.6  Min: 64  Max: 80  Resp  Av  Min: 16  Max: 20  SpO2  Av %  Min: 91 %  Max: 94 %  Weight  Av.5 kg (190 lb 11.2 oz)  Min: 86.5 kg (190 lb 11.2 oz)  Max: 86.5 kg (190 lb 11.2 oz)  Body mass index is 33.78 kg/(m^2).  I/O last 3 completed shifts:  In: 1479.3 [P.O.:1200; I.V.:179.3; IV Piggyback:100]  Out: 4730 [Urine:4730]    General: Alert and awake in no apparent distress  Head:  Normocephalic and atraumatic  Eyes:  PERRL; EOMi with anicteric sclera and clear conjunctivae  Mouth:  Oropharynx clear and without exudate; moist mucous membranes  Cardio:  Regular rate and rhythm with normal S1 and S2; no murmurs or rubs  Resp:  CTAB; respirations unlabored; no wheezes, crackles or rhonchi  Abdom: Soft, NTND with normoactive bowel sounds  Extrem: Warm and well-perfused with no clubbing, cyanosis or edema  Skin:  No rashes, lesions, or color changes  Pulses: 2+ and symmetric distally  Neuro:  AAOx3; cooperative and pleasant with no focal deficits    Laboratory:  Laboratory Data  Pertinent Findings:  Recent Labs      17   0348  17   0342  17   0251   WBC  5.28  5.24   5.76   HGB  8.0*  8.3*  8.3*   HCT  25.4*  25.6*  25.2*   PLT  293  252  257   MCV  93  91  90   RDW  15.6*  15.6*  15.5*   GRAN  75.5*  4.0  74.1*  3.9  76.5*  4.4   LYMPH  10.2*  0.5*  10.3*  0.5*  9.2*  0.5*   MONO  9.7  0.5  12.0  0.6  10.9  0.6   EOS  0.2  0.2  0.2   BASO  0.03  0.01  0.02   EOSINOPHIL  4.0  3.4  2.8   BASOPHIL  0.6  0.2  0.3       Recent Labs      01/07/17   0348  01/08/17   0342  01/09/17   0251   NA  137  137  135*  134*   K  3.9  3.9  3.7  3.6   CL  97  97  92*  90*   CO2  33*  33*  37*  35*   BUN  66*  66*  67*  66*   CREATININE  3.6*  3.6*  3.7*  3.7*     Recent Labs      01/08/17   0454  01/08/17   1126  01/08/17   1611  01/08/17   2000  01/09/17   0558   POCTGLUCOSE  90  136*  155*  249*  122*     No results for input(s): PROT, ALBUMIN, BILITOT, AST, ALT, ALKPHOS in the last 72 hours.    Microbiology Data  Pertinent Findings:  No new labs    Other Results:  EKG (my interpretation): No new EKG    Radiology Data   Pertinent Findings (my interpretation):  No new images    Current Medications:     Infusions:        Scheduled:   amlodipine  10 mg Oral QHS    aspirin  81 mg Oral Daily    carvedilol  6.25 mg Oral BID    citalopram  20 mg Oral Daily    cloNIDine 0.1 mg/24 hr td ptwk  1 patch Transdermal Q7 Days    epoetin reynaldo (PROCRIT) injection  20,000 Units Subcutaneous Q7 Days    ergocalciferol  50,000 Units Oral Q7 Days    ferrous sulfate  325 mg Oral Daily    furosemide  80 mg Oral Q8H    heparin (porcine)  5,000 Units Subcutaneous Q12H    hydrALAZINE  50 mg Oral TID    insulin detemir  8 Units Subcutaneous QHS    iron sucrose (VENOFER) IVPB  100 mg Intravenous Daily    metOLazone  10 mg Oral Daily    rosuvastatin  40 mg Oral Daily    sodium chloride 0.9%  3 mL Intravenous Q8H    trazodone  50 mg Oral QHS    vitamin renal formula (B-complex-vitamin c-folic acid)  1 capsule Oral Daily        PRN:  dextrose 50%, dextrose 50%, glucagon (human recombinant),  glucose, glucose, flu vac [id number] 36mos up(PF), insulin aspart, pneumoc 13-eugenio conj-dip cr(PF)    Antibiotics and Day Number of Therapy:  None    Lines and Day Number of Therapy:  PIV x1    Assessment:     Ambrocio Chin is a 66 y.o.male with  Patient Active Problem List    Diagnosis Date Noted    Congestive heart failure 01/03/2017    Hyperkalemia 01/03/2017    Long term (current) use of anticoagulants 12/05/2016    Thrombus 12/05/2016    Aspiration pneumonia of left lower lobe 11/18/2016    Left-sided cerebrovascular accident (CVA) 11/18/2016    Benign essential HTN 11/18/2016    Fever 11/17/2016    Oropharyngeal dysphagia 11/17/2016    Iron deficiency anemia 11/16/2016    Left pontine stroke 11/16/2016    Stenosis of left carotid artery     Hypertension 11/15/2016    Type 2 diabetes mellitus with hyperglycemia, with long-term current use of insulin 11/15/2016    Hyperlipidemia 11/15/2016    CKD stage 3 due to type 2 diabetes mellitus 11/15/2016    JANEL (acute kidney injury) 11/15/2016    PVD (peripheral vascular disease) with claudication 11/15/2016    Acute on chronic systolic heart failure 11/15/2016    Chronic pulmonary edema 11/15/2016        Plan:     67yo [place] speaking M w/ PMH HFrEF, L pontine infarct on 11/15/16 s/p inpatient rehab discharged 12/2/16, LV thrombus on warfarin, HTN, DM2, HLD, CKD4, diabetic retinopathy with complaint of SOB x3 days.      Fluid Overload in setting of HFrEF and CKD4  - Patient grossly fluid overloaded. SOB x 3 days, 10lb weight gain over past 2 weeks  - 4+ pitting edema to thighs and on hands bilaterally, JVP 15cm  - 1/3/16 CXR: cardiomegaly and pulmonary vascular congestion along with pulmonary edema and pleural effusions consistent w/ CHF  - 11/16/16 TTE: LV EF 35%   - 1/5/16 TTE: LV EF 65%, diastolic dysfunction, Pulm HTN PA Sys Pr 48mmHg (EF improved from previous)  - At admission, BNP 1670. Troponin x2 and EKG neg for ischemia  - Hold home Coreg in  setting acute HFrEF exacerbation. Hold home lisinopril in setting of JANEL  - Metolazone 10mg PO x1 in ED  - As per nephrology, 1/8/17 Bumex GTT discontinued and switch to Lasix 80mg PO TID. Patient starting to have contraction alkalosis HCO3- 35, Cr stable at baseline.  - Continue to monitor I&O and daily weights. Currently, Net I&O -15.5L since admission  - Continue to follow nephrology recommendations: holding off on dialysis for now, will need AV fistula as outpatient      JANEL on CKD4  - Likely secondary to cardio-renal syndrome due to volume overload secondary to HFrEF  - At admission, Cr 4.1 (baseline Cr 2.6-2.9). Cr today 3.7   - Hold home lisinopril in setting of JANEL  - 1/3/16 Renal US: Bilateral echogenic kidneys, compatible with medical renal disease. Increased resistive index involving the right kidney.  - Protein:Cr Ratio: 2.75, FeNa = 3.6% (likely intrinsic renal cause)  - 1/4/17 SPEP: elevated Lambda and Kappa Free Light Chains: K =14.6, L = 9.18.  - 1/3/16 24hr Cr Clearance: 19.5 (low). 24hr Urine Protien: 45 (high). 24hr Urine Microalb: 850 (high)  - FeNa = 3.7% (Intrinsic Renal)  - Nephrology consulted, holding dialysis for now  - Continue diuresis      Hyperkalemia, resolved  - On admission, K 6.0 (previously 12/16/16 K 5.7)  - No EKG changes, no peaked T-waves, no ST-changes, no widening of QRS  - Ca Gluconate, Albuterol Neb, Kayexalate given in ED  - Currently, K 3.7  - Continue on Bumex GTT      LV Thrombus, resolved  - 11/17/16 TTE: small round apical thrombus attached to the lateral part of the apex.  - Patient previously started on Warfarin, concern for medication non-compliance  - At admission, sub-theraputic INR 1.1  - 1/5/16 TTE: There is no evidence of intracavity mass, thrombi, or vegetation.   - Discontinue home coumadin       Hx L pontine CVA  - 11/15/16 admitted for L pontine infarct, likely source was LV Thrombus  - S/p inpatient rehab discharged 12/2/16  - Stable mild right sided  deficits, mild dysarthria, no acute issues  - Continue home aspirin, plavix, hold coumadin for now for possible line placement      Palpitiaions  - S/p loop Recorder implanted 11/17/16  - Currently no acute issues, will monitor on telemetry  - F/u outpatient      HTN  - On admission, /77 - now improved  - Continue on home amlodipine, clonidine 0.1mg/24hr patch, hydralazine TID   - Hold home coreg due to acute HFrEF decompensation       Normocytic Anemia  - At admission H/H 8.2/26.3 MCV 94  - Likely secondary to iron deficiency and CKD  - 11/16/16 Iron <10, Ferritin 546, B12 and Folate WNL  - Continue Venifer 3x per wk  - Continue home ferrous sulfate/colace      DM2 w/ neuropathy, nephropathy, retinopathy  - 11/15/16 A1C 10.5  - home dose insulin Detemir 12U + SSI  - glucose low this AM, decrease to 8U nightly while in house      HLD  - 11/15/16 Lipid Panel: TChol 183, , HDL 27  - Continue home rosuvastatin      Anxiety/Depression  - Stable, denies SI/HI  - Continue home citalopram and trazodone qHS      DVT PPx: Heparin  Diet: Renal  Code: Full      Disposition: pending clinical improvement    Justyn Albarran  [facility] Internal Medicine HO-I  U Internal Medicine Service Team B    [facility] Medicine Hospitalist Pager numbers:   U Hospitalist Medicine Team A (Patsy/Nando): [phone]  [facility] Hospitalist Medicine Team B (Lukas/Martha):  [phone]

## 2017-01-09 NOTE — PLAN OF CARE
Verbal report given to oncoming nurse, Ayse .  Care of patient transferred to oncoming nurse at this time.

## 2017-01-09 NOTE — PLAN OF CARE
Problem: Physical Therapy Goal  Goal: Physical Therapy Goal  Goals to be met by: 17     Patient will increase functional independence with mobility by performin. Supine <> sit with [place]  2. Sit to stand transfer with Modified [place]  3. Gait x 150 feet with Modified [place] using Rolling Walker.   4. Ascend/Descend curb step with Stand-by Assistance   5. Lower extremity exercise program x15 reps with independence   Pt progressing towards goals, has met goals #1,2.

## 2017-01-09 NOTE — CONSULTS
Consulted 2/2 pt with questions regarding Renal ADA Cardiac diet. Answered pt questions & also provided him diet education handouts in Frisian.

## 2017-01-09 NOTE — PT/OT/SLP PROGRESS
Physical Therapy  Treatment    Ambrocio Chin   MRN: [id number]   Admitting Diagnosis: Acute on chronic systolic heart failure    PT Received On: 17  PT Start Time: 1042     PT Stop Time: 1053    PT Total Time (min): 11 min       Billable Minutes:  Gait [id number]    Treatment Type: Treatment  PT/PTA: PTA     PTA Visit Number: 1       General Precautions: Standard, fall, diabetic  Orthopedic Precautions: N/A   Braces:      Do you have any cultural, spiritual, Sikhism conflicts, given your current situation?: none    Subjective:  Communicated with ns prior to session.  Pt agreeable to tx. , very pleasant.  Pain Ratin/10              Pain Rating Post-Intervention: 0/10    Objective:   Patient found with: peripheral IV, telemetry    Functional Mobility:  Bed Mobility:   Rolling/Turning to Left: Independent  Supine to Sit: Independent    Transfers:  Sit <> Stand Assistance: Stand By Assistance, Supervision  Sit <> Stand Assistive Device: Rolling Walker    Gait:   Gait Distance: 150' w/ RW and SBA/Sup  Assistance 1: Stand by Assistance, Supervision  Gait Assistive Device: Rolling walker  Gait Pattern: reciprocal  Gait Deviation(s): decreased obdulio    Stairs:  Did not attempt    Balance:   Static Sit: GOOD: Takes MODERATE challenges from all directions  Dynamic Sit: GOOD: Maintains balance through MODERATE excursions of active trunk movement  Static Stand: FAIR+: Takes MINIMAL challenges from all directions  Dynamic stand: FAIR+: Needs CLOSE SUPERVISION during gait and is able to right self with minor LOB     Therapeutic Activities and Exercises:  Pt perf'd sit to stand from EOB w/ CGA/SBA x 5 reps., did not use UE's to assist.     AM-PAC 6 CLICK MOBILITY  How much help from another person does this patient currently need?   1 = Unable, Total/Dependent Assistance  2 = A lot, Maximum/Moderate Assistance  3 = A little, Minimum/Contact Guard/Supervision  4 = None, Modified [place]/Independent    Turning over in  bed (including adjusting bedclothes, sheets and blankets)?: 4  Sitting down on and standing up from a chair with arms (e.g., wheelchair, bedside commode, etc.): 4  Moving from lying on back to sitting on the side of the bed?: 4  Moving to and from a bed to a chair (including a wheelchair)?: 4  Need to walk in hospital room?: 3  Climbing 3-5 steps with a railing?: 3  Total Score: 22    AM-PAC Raw Score CMS G-Code Modifier Level of Impairment Assistance   6 % Total / Unable   7 - 9 CM 80 - 100% Maximal Assist   10 - 14 CL 60 - 80% Moderate Assist   15 - 19 CK 40 - 60% Moderate Assist   20 - 22 CJ 20 - 40% Minimal Assist   23 CI 1-20% SBA / CGA   24 CH 0% Independent/ Mod I     Patient left sitting up EOB with all lines intact, call button in reach and ns notified.    Assessment:  Ambrocio Chin is a 66 y.o. male with a medical diagnosis of Acute on chronic systolic heart failure and presents with good mobility today, NO LOB or SOB noted. HR:72, O2:94-97% on RA following amb..    Rehab identified problem list/impairments: Rehab identified problem list/impairments: weakness, impaired endurance, impaired functional mobilty    Rehab potential is good.    Activity tolerance: Good    Discharge recommendations: Discharge Facility/Level Of Care Needs: home     Barriers to discharge: Barriers to Discharge: None    Equipment recommendations: Equipment Needed After Discharge: bath bench     GOALS:   Physical Therapy Goals        Problem: Physical Therapy Goal    Goal Priority Disciplines Outcome Goal Variances Interventions   Physical Therapy Goal     PT/OT, PT Ongoing (interventions implemented as appropriate)     Description:  Goals to be met by: 17     Patient will increase functional independence with mobility by performin. Supine <> sit with [place]  2. Sit to stand transfer with Modified [place]  3. Gait  x 150 feet with Modified [place] using Rolling Walker.   4. Ascend/Descend curb step  with Stand-by Assistance   5. Lower extremity exercise program x15 reps with independence                PLAN:    Patient to be seen 5 x/week  to address the above listed problems via gait training, therapeutic activities, therapeutic exercises  Plan of Care expires: 02/05/17  Plan of Care reviewed with: patient         Merced Francisco, PTA  01/09/2017

## 2017-01-09 NOTE — PROGRESS NOTES
Progress Note  Nephrology      Consult Requested By: Jayy Gaytan MD  Reason for Consult: JANEL on CKd 4    SUBJECTIVE:     Review of Systems   Constitutional: Negative for chills and fever.   Respiratory: Negative for cough and shortness of breath.    Cardiovascular: Negative for chest pain and leg swelling.   Gastrointestinal: Negative for nausea.     Patient Active Problem List   Diagnosis    Hypertension    Type 2 diabetes mellitus with hyperglycemia, with long-term current use of insulin    Hyperlipidemia    CKD stage 3 due to type 2 diabetes mellitus    JANEL (acute kidney injury)    PVD (peripheral vascular disease) with claudication    Acute on chronic systolic heart failure    Chronic pulmonary edema    Stenosis of left carotid artery    Iron deficiency anemia    Left pontine stroke    Fever    Oropharyngeal dysphagia    Aspiration pneumonia of left lower lobe    Left-sided cerebrovascular accident (CVA)    Benign essential HTN    Long term (current) use of anticoagulants    Thrombus    Congestive heart failure    Hyperkalemia       OBJECTIVE:     Medications:   amlodipine  10 mg Oral QHS    aspirin  81 mg Oral Daily    carvedilol  6.25 mg Oral BID    citalopram  20 mg Oral Daily    cloNIDine 0.1 mg/24 hr td ptwk  1 patch Transdermal Q7 Days    epoetin reynaldo (PROCRIT) injection  20,000 Units Subcutaneous Q7 Days    ergocalciferol  50,000 Units Oral Q7 Days    ferrous sulfate  325 mg Oral Daily    furosemide  80 mg Oral Q8H    heparin (porcine)  5,000 Units Subcutaneous Q12H    hydrALAZINE  50 mg Oral TID    insulin detemir  8 Units Subcutaneous QHS    iron sucrose (VENOFER) IVPB  100 mg Intravenous Daily    metOLazone  10 mg Oral Daily    potassium chloride  40 mEq Oral Q4H    rosuvastatin  40 mg Oral Daily    sodium chloride 0.9%  3 mL Intravenous Q8H    trazodone  50 mg Oral QHS    vitamin renal formula (B-complex-vitamin c-folic acid)  1 capsule Oral Daily         Vitals:    01/09/17 0800   BP: (!) 115/59   Pulse: 73   Resp: 20   Temp: 97.3 °F (36.3 °C)     I/O last 3 completed shifts:  In: 1479.3 [P.O.:1200; I.V.:179.3; IV Piggyback:100]  Out: 4730 [Urine:4730]  Physical Exam   Constitutional: He is oriented to person, place, and time. He appears well-developed and well-nourished. No distress.   HENT:   Head: Normocephalic and atraumatic.   Eyes: EOM are normal. No scleral icterus.   Cardiovascular: Normal rate, regular rhythm, normal heart sounds and intact distal pulses.  Exam reveals no gallop and no friction rub.    No murmur heard.  Pulmonary/Chest: Effort normal. He has no wheezes. He has rales (BLL).   Abdominal: Soft. Bowel sounds are normal. He exhibits no distension. There is no tenderness.   Musculoskeletal: Normal range of motion. He exhibits edema (4+ pittng BLE).   Lymphadenopathy:     He has no cervical adenopathy.   Neurological: He is alert and oriented to person, place, and time.   Skin: Skin is warm and dry. No rash noted. He is not diaphoretic.   Psychiatric: Thought content normal.     Laboratory:    Recent Labs  Lab 01/07/17 0348 01/08/17  0342 01/09/17  0251   WBC 5.28 5.24 5.76   HGB 8.0* 8.3* 8.3*   HCT 25.4* 25.6* 25.2*    252 257   MONO 9.7  0.5 12.0  0.6 10.9  0.6       Recent Labs  Lab 01/07/17  0348 01/08/17  0342 01/09/17  0251     137 135* 134*   K 3.9  3.9 3.7 3.6   CL 97  97 92* 90*   CO2 33*  33* 37* 35*   BUN 66*  66* 67* 66*   CREATININE 3.6*  3.6* 3.7* 3.7*   CALCIUM 8.3*  8.3* 8.5* 8.3*   PHOS 5.3* 5.3* 5.4*     Labs reviewed  Diagnostic Results:  X-Ray: Reviewed  US: Reviewed  Echo: Reviewed      ASSESSMENT/PLAN:   1. JANEL on CKD 4 - cr stabilized at 3.6. GFR 17  UO 2830 cc/ 24hour--> cont metolazone and  lasix 80 PO TID  2. HTN -  Titrate coreg up, add low dose lisinopril and wean off clonidine  3. MBD -  PTh 96 and vit D 17 - on ergocalciferol  4. Anemia -      1/3/2017 16:51   Iron 39 (L)   TIBC 275    Saturated Iron 14 (L)   Transferrin 186 (L)   Ferritin   231    Started Venofer - give 2nd dose today before d/c    5. EF was 35% 1 month ago but now improved to 65  6. Uncontrolled DM2 - hb A1C>10  7. Proteinuria  2 g on 24 hours urine/ UPCR- 2.75 -  hep panel negative      Ref. Range 1/4/2017 13:19   [place] Free Light Chains Latest Ref Range: 0.33 - 1.94 mg/dL 14.67 (H)   [place] Free Light Chains Latest Ref Range: 0.57 - 2.63 mg/dL 9.18 (H)   Kappa/Lambda FLC Ratio Latest Ref Range: 0.26 - 1.65  1.60     F/u with Dr. Schmitz 1/12/17 at 11 am - labs to be done on Wednsday AM (renal function panel and CBC)      Thank you for allowing me to participate in the care of your patients  With any question please call [phone]  Ingris Prescott    [facility]  MATT Schmitz MD, FACP,   CASEY Alan MD,   MD HEIDI Lira, NP  [address]  [place], [address] [phone]

## 2017-01-10 ENCOUNTER — PATIENT OUTREACH (OUTPATIENT)
Dept: ADMINISTRATIVE | Facility: CLINIC | Age: 67
End: 2017-01-10
Payer: MEDICARE

## 2017-01-10 ENCOUNTER — CLINICAL SUPPORT (OUTPATIENT)
Dept: ELECTROPHYSIOLOGY | Facility: CLINIC | Age: 67
End: 2017-01-10
Payer: MEDICARE

## 2017-01-10 DIAGNOSIS — I63.9 CRYPTOGENIC STROKE: ICD-10-CM

## 2017-01-10 DIAGNOSIS — Z95.818 STATUS POST PLACEMENT OF IMPLANTABLE LOOP RECORDER: ICD-10-CM

## 2017-01-10 LAB — PATHOLOGIST INTERPRETATION UIFE: NORMAL

## 2017-01-10 PROCEDURE — 93297 REM INTERROG DEV EVAL ICPMS: CPT | Mod: ,,, | Performed by: INTERNAL MEDICINE

## 2017-01-10 PROCEDURE — 93299 LOOP RECORDER REMOTE: CPT | Mod: PBBFAC | Performed by: INTERNAL MEDICINE

## 2017-01-10 NOTE — PATIENT INSTRUCTIONS

## 2017-01-10 NOTE — PROGRESS NOTES
C3 nurse attempted to contact patient. No answer. The following message was left for the patient to return the call:  Good morning  I am a nurse calling on behalf of [facility] from the [facility].  This is a Transitional Care Call for Ambrocio Chin. When you have a moment please contact us at [phone] or [phone] Monday through Friday, between the hours of 8 am to 4 pm. We look forward to speaking with you. On behalf of [facility] have a nice day.    The patient has a scheduled HOS appointment with [facility] On 1/13/2017 @ 10: 30. Message sent to Physician staff.    pcp not in database, unable to route

## 2017-01-11 ENCOUNTER — LAB VISIT (OUTPATIENT)
Dept: LAB | Facility: HOSPITAL | Age: 67
End: 2017-01-11
Attending: INTERNAL MEDICINE
Payer: MEDICARE

## 2017-01-11 ENCOUNTER — ANTI-COAG VISIT (OUTPATIENT)
Dept: CARDIOLOGY | Facility: CLINIC | Age: 67
End: 2017-01-11

## 2017-01-11 DIAGNOSIS — N18.30 CKD STAGE 3 DUE TO TYPE 2 DIABETES MELLITUS: ICD-10-CM

## 2017-01-11 DIAGNOSIS — Z79.01 LONG TERM (CURRENT) USE OF ANTICOAGULANTS: ICD-10-CM

## 2017-01-11 DIAGNOSIS — I82.90 THROMBUS: ICD-10-CM

## 2017-01-11 DIAGNOSIS — E11.22 CKD STAGE 3 DUE TO TYPE 2 DIABETES MELLITUS: ICD-10-CM

## 2017-01-11 DIAGNOSIS — I63.9 LEFT-SIDED CEREBROVASCULAR ACCIDENT (CVA): ICD-10-CM

## 2017-01-11 LAB
ALBUMIN SERPL BCP-MCNC: 2.8 G/DL
ALP SERPL-CCNC: 133 U/L
ALT SERPL W/O P-5'-P-CCNC: 23 U/L
ANION GAP SERPL CALC-SCNC: 9 MMOL/L
AST SERPL-CCNC: 17 U/L
BASOPHILS # BLD AUTO: 0.02 K/UL
BASOPHILS NFR BLD: 0.3 %
BILIRUB SERPL-MCNC: 0.4 MG/DL
BUN SERPL-MCNC: 78 MG/DL
CALCIUM SERPL-MCNC: 8.4 MG/DL
CHLORIDE SERPL-SCNC: 92 MMOL/L
CO2 SERPL-SCNC: 33 MMOL/L
CREAT SERPL-MCNC: 4.8 MG/DL
DIFFERENTIAL METHOD: ABNORMAL
EOSINOPHIL # BLD AUTO: 0.2 K/UL
EOSINOPHIL NFR BLD: 3 %
ERYTHROCYTE [DISTWIDTH] IN BLOOD BY AUTOMATED COUNT: 16.2 %
EST. GFR  (AFRICAN AMERICAN): 14 ML/MIN/1.73 M^2
EST. GFR  (NON AFRICAN AMERICAN): 12 ML/MIN/1.73 M^2
GLUCOSE SERPL-MCNC: 314 MG/DL
HCT VFR BLD AUTO: 26.8 %
HGB BLD-MCNC: 8.5 G/DL
LYMPHOCYTES # BLD AUTO: 0.5 K/UL
LYMPHOCYTES NFR BLD: 7.3 %
MAGNESIUM SERPL-MCNC: 2.6 MG/DL
MCH RBC QN AUTO: 29.4 PG
MCHC RBC AUTO-ENTMCNC: 31.7 %
MCV RBC AUTO: 93 FL
MONOCYTES # BLD AUTO: 0.7 K/UL
MONOCYTES NFR BLD: 9.7 %
NEUTROPHILS # BLD AUTO: 5.3 K/UL
NEUTROPHILS NFR BLD: 79.6 %
PHOSPHATE SERPL-MCNC: 5.3 MG/DL
PLATELET # BLD AUTO: 265 K/UL
PMV BLD AUTO: 8.5 FL
POTASSIUM SERPL-SCNC: 4.6 MMOL/L
PROT SERPL-MCNC: 6.5 G/DL
RBC # BLD AUTO: 2.89 M/UL
SODIUM SERPL-SCNC: 134 MMOL/L
WBC # BLD AUTO: 6.68 K/UL

## 2017-01-11 PROCEDURE — 36415 COLL VENOUS BLD VENIPUNCTURE: CPT

## 2017-01-11 PROCEDURE — 84100 ASSAY OF PHOSPHORUS: CPT

## 2017-01-11 PROCEDURE — 85025 COMPLETE CBC W/AUTO DIFF WBC: CPT

## 2017-01-11 PROCEDURE — 80053 COMPREHEN METABOLIC PANEL: CPT

## 2017-01-11 PROCEDURE — 83735 ASSAY OF MAGNESIUM: CPT

## 2017-01-11 NOTE — DISCHARGE SUMMARY
[facility] Internal Medicine Discharge Summary    Primary Team: [facility] Internal Medicine Team B  Attending Physician: Dr. Jayy Gaytan  Resident: Xuan  Intern: Deion    Date of Admit: 1/3/2017  Date of Discharge: 1/11/2017    Discharge to: Home  Condition: Stable for Discharge    Discharge Diagnoses     Patient Active Problem List   Diagnosis    Hypertension    Type 2 diabetes mellitus with hyperglycemia, with long-term current use of insulin    Hyperlipidemia    CKD stage 3 due to type 2 diabetes mellitus    JANEL (acute kidney injury)    PVD (peripheral vascular disease) with claudication    Acute on chronic systolic heart failure    Chronic pulmonary edema    Stenosis of left carotid artery    Iron deficiency anemia    Left pontine stroke    Fever    Oropharyngeal dysphagia    Aspiration pneumonia of left lower lobe    Left-sided cerebrovascular accident (CVA)    Benign essential HTN    Long term (current) use of anticoagulants    Thrombus    Congestive heart failure    Hyperkalemia       Consultants and Procedures     Consultants:  Nephrology (Raya Alan)    Procedures:   None    Brief History of Present Illness      67yo Nauruan speaking M w/ PMH HFrEF, L pontine infarct on 11/15/16 s/p inpatient rehab discharged 12/2/16, LV thrombus on warfarin, HTN, CAD s/p MI, DM2, HLD, CKD4, diabetic retinopathy with complaint of SOB x3 days. Patient was in his usual state of heal (able to perform all his ADLs, manages his own medications) until 3 days ago when he began to feel increasing short of breath. She states shortness of breath is worse with exertion. At baseline, he can ambulate 100-200ft with out SOB and fatigue, however he currently can only take a few steps without SOB and fatigue. He denies any chest pains or cough, but does admit to occasional palpitations that will wake him from sleep at night. He states he sleeps with 2 pillows however admits to have to sleep sitting up and feels short of  breath when sleepign laying down. He admits to 10lb weight gain over the past 2 weeks. Patient denies fever, chills, chest pain, cough, abdominal pain, dizziness, lightheadedness.       For the full HPI please refer to the History & Physical from this admission.    Hospital Course By Problem with Pertinent Findings     Fluid Overload in setting of HFrEF and CKD4  - Patient grossly fluid overloaded. SOB x 3 days, 10lb weight gain over past 2 weeks  - At admission, 4+ pitting edema to thighs and on hands bilaterally, JVP 15cm  - 1/3/16 CXR: cardiomegaly and pulmonary vascular congestion along with pulmonary edema and pleural effusions consistent w/ CHF  - 11/16/16 TTE: LV EF 35%   - 1/5/16 TTE: LV EF 65%, diastolic dysfunction, Pulm HTN PA Sys Pr 48mmHg (EF improved from previous)  - At admission, BNP 1670. Troponin x2 and EKG neg for ischemia  - Hold home Coreg in setting acute HFrEF exacerbation. Hold home lisinopril in setting of JANEL  - Metolazone 10mg PO x1 in ED, Bumex GTT started at admission  - As per nephrology, 1/8/17 Bumex GTT discontinued and switch to Lasix 80mg PO TID. Patient starting to have contraction alkalosis HCO3- 35, Cr stable at baseline.  - Continued to monitor I&O and daily weights. Currently, Net I&O -16.0L since admission  - Continue to follow nephrology recommendations: holding off on dialysis for now, will need AV fistula as outpatient      JANEL on CKD4  - Likely secondary to cardio-renal syndrome due to volume overload secondary to HFrEF  - At admission, Cr 4.1 (baseline Cr 2.6-2.9). At discharge, Cr 3.7   - Hold home lisinopril in setting of JANEL  - 1/3/16 Renal US: Bilateral echogenic kidneys, compatible with medical renal disease. Increased resistive index involving the right kidney.  - Protein:Cr Ratio: 2.75, FeNa = 3.6% (likely intrinsic renal cause)  - 1/4/17 SPEP: elevated Lambda and Kappa Free Light Chains: K =14.6, L = 9.18.  - 1/3/16 24hr Cr Clearance: 19.5 (low). 24hr Urine  Protien: 45 (high). 24hr Urine Microalb: 850 (high)  - Nephrology consulted, holding dialysis for now  - Continued diuresis      Hyperkalemia, resolved  - On admission, K 6.0 (previously 12/16/16 K 5.7)  - No EKG changes, no peaked T-waves, no ST-changes, no widening of QRS  - Ca Gluconate, Albuterol Neb, Kayexalate given in ED  - At discharge, K 3.7  - Continued on Bumex GTT, then switched to Lasix 80 PO TID as per nephrology recommendations on 1/8/17      LV Thrombus, resolved  - 11/17/16 TTE: small round apical thrombus attached to the lateral part of the apex.  - Patient previously started on Warfarin, concern for medication non-compliance  - At admission, sub-theraputic INR 1.1  - 1/5/16 TTE: There is no evidence of intracavity mass, thrombi, or vegetation.   - Discontinued home coumadin       Hx L pontine CVA  - 11/15/16 admitted for L pontine infarct, likely source was LV Thrombus  - S/p inpatient rehab discharged 12/2/16  - Stable mild right sided deficits, mild dysarthria, no acute issues  - Continued home aspirin, plavix, coumadin discontinued due to resolution of LV thrombus      Palpitiaions  - S/p loop Recorder implanted 11/17/16  - Currently no acute issues, will monitor on telemetry  - F/u outpatient      HTN  - On admission, /77 - now improved  - Continue on home amlodipine, clonidine 0.1mg/24hr patch, hydralazine TID   - Held home coreg due to acute HFrEF decompensation       Normocytic Anemia  - At admission H/H 8.2/26.3 MCV 94  - Likely secondary to iron deficiency and CKD  - 11/16/16 Iron <10, Ferritin 546, B12 and Folate WNL  - Continued Venifer 3x per wk  - Continued home ferrous sulfate/colace      DM2 w/ neuropathy, nephropathy, retinopathy  - 11/15/16 A1C 10.5  - Decreased from home dose insulin Detemir 12U to Detemir 8U + SSI      HLD  - 11/15/16 Lipid Panel: TChol 183, , HDL 27  - Continued home rosuvastatin      Anxiety/Depression  - Stable, denies SI/HI  - Continued home  citalopram and trazodone qHS    Discharge Medications        Medication List      START taking these medications          aspirin 81 MG EC tablet   Commonly known as:  ECOTRIN   Take 1 tablet (81 mg total) by mouth once daily.       ergocalciferol 50,000 unit Cap   Commonly known as:  ERGOCALCIFEROL   Take 1 capsule (50,000 Units total) by mouth every 7 days.       lancets Misc   Commonly known as:  ACCU-CHEK SOFTCLIX LANCETS   1 lancet by Misc.(Non-Drug; Combo Route) route 2 hours after meals and at bedtime.       metOLazone 10 MG tablet   Commonly known as:  ZAROXOLYN   Take 1 tablet (10 mg total) by mouth once daily.       nut.tx.impaired renal fxn,soy 0.04-1.79 gram-kcal/mL Liqd   1 Can with Meals Three Times a Day       vitamin renal formula (B-complex-vitamin c-folic acid) 1 mg Cap   Commonly known as:  NEPHROCAP   Take 1 capsule by mouth once daily.         CHANGE how you take these medications          carvedilol 12.5 MG tablet   Commonly known as:  COREG   Take 1 tablet (12.5 mg total) by mouth 2 (two) times daily.   What changed:  when to take this       furosemide 80 MG tablet   Commonly known as:  LASIX   Take 1 tablet (80 mg total) by mouth every 8 (eight) hours.   What changed:    - medication strength  - how much to take  - when to take this  - Another medication with the same name was removed. Continue taking this medication, and follow the directions you see here.         CONTINUE taking these medications          amlodipine 10 MG tablet   Commonly known as:  NORVASC   Take 1 tablet (10 mg total) by mouth every evening.       citalopram 20 MG tablet   Commonly known as:  CELEXA   Take 1 tablet (20 mg total) by mouth once daily.       ferrous sulfate 325 (65 FE) MG EC tablet   Take 1 tablet (325 mg total) by mouth once daily.       hydrALAZINE 50 MG tablet   Commonly known as:  APRESOLINE   Take 1 tablet (50 mg total) by mouth 3 (three) times daily.       insulin glargine 100 unit/mL injection    Commonly known as:  LANTUS   Inject 12 Units into the skin every evening.       lisinopril 40 MG tablet   Commonly known as:  PRINIVIL,ZESTRIL   Take 1 tablet (40 mg total) by mouth once daily.       rosuvastatin 20 MG tablet   Commonly known as:  CRESTOR   Take 2 tablets (40 mg total) by mouth once daily.       trazodone 50 MG tablet   Commonly known as:  DESYREL   Take 1 tablet (50 mg total) by mouth every evening.         STOP taking these medications          cloNIDine 0.1 mg/24 hr td ptwk 0.1 mg/24 hr   Commonly known as:  CATAPRES       warfarin 4 MG tablet   Commonly known as:  COUMADIN            Where to Get Your Medications      You can get these medications from any pharmacy     Bring a paper prescription for each of these medications     amlodipine 10 MG tablet    carvedilol 12.5 MG tablet    ergocalciferol 50,000 unit Cap    furosemide 80 MG tablet    hydrALAZINE 50 MG tablet    insulin glargine 100 unit/mL injection    lancets Misc    lisinopril 40 MG tablet    metOLazone 10 MG tablet    nut.tx.impaired renal fxn,soy 0.04-1.79 gram-kcal/mL Liqd    vitamin renal formula (B-complex-vitamin c-folic acid) 1 mg Cap       You don't need a prescription for these medications     aspirin 81 MG EC tablet             Discharge Information:   Diet:  Renal, Cardiac    Physical Activity:  As tolerated    Instructions:  1. Take all medications as prescribed  2. Keep all follow-up appointments  3. Return to the hospital or call your primary care physicians if any worsening symptoms such as fever, chills, chest pain, palpations, shortness of  Breath, abdominal pain, dizziness, lightheadedness or as needed    Follow-Up Appointments:  Follow-up Information     Follow up with [facility] On 1/13/2017.    Why:  time: 10: 30    Contact information:    [address] [place] [address]  [phone]          Follow up with Luis Alberto Schmitz MD. Go on 1/12/2017.    Specialty:  Nephrology    Why:   Time: 11am    Contact information:    [address]  [address]  [place] [address]  [phone]          Schedule an appointment as soon as possible for a visit with Giovani Elkins MD.    Specialty:  Vascular Surgery    Why:  Evaluation for AV-Fistula    Contact information:    [address]  [address]  [place] [address]  [phone]          Follow up with Gaurav Pichardo MD On 1/27/2017.    Specialties:  INTERVENTIONAL CARDIOLOGY, Cardiology    Why:  at 8:20am    Contact information:    [address]  [address]  [place] [address]  [phone]          Follow up with KRISTOPHER.    Why:  Tuesday at 1100 Jan 17    Contact information:    [address]  [place] [address]  [phone]          Follow up with Luis Alberto Schmitz MD.    Specialty:  Nephrology    Why:  Jan 12th at 11     Contact information:    [address]  [address]  [place] [address]  [phone]          Follow up with Giovani Elkins MD.    Specialty:  Vascular Surgery    Why:  JAN 31ST AT 10AM    Contact information:    [address]  [address]  [place] [address]  [phone]            Justyn Albarran  [facility] Internal Medicine, [facility]

## 2017-01-11 NOTE — PROGRESS NOTES
Pts coumadin was d/c while admitted due to resolution of thrombus and considering known noncompliance.

## 2017-01-25 ENCOUNTER — LAB VISIT (OUTPATIENT)
Dept: LAB | Facility: HOSPITAL | Age: 67
End: 2017-01-25
Attending: INTERNAL MEDICINE
Payer: MEDICARE

## 2017-01-25 DIAGNOSIS — E87.5 HYPERKALEMIA: ICD-10-CM

## 2017-01-25 DIAGNOSIS — D64.9 CHRONIC ANEMIA: ICD-10-CM

## 2017-01-25 DIAGNOSIS — N28.1 SIMPLE RENAL CYST: ICD-10-CM

## 2017-01-25 DIAGNOSIS — I10 ESSENTIAL (PRIMARY) HYPERTENSION: Primary | ICD-10-CM

## 2017-01-25 DIAGNOSIS — E11.9 TYPE II DIABETES MELLITUS: ICD-10-CM

## 2017-01-25 DIAGNOSIS — D63.1 ANEMIA IN CHRONIC KIDNEY DISEASE(285.21): ICD-10-CM

## 2017-01-25 DIAGNOSIS — N18.4 CKD (CHRONIC KIDNEY DISEASE), STAGE IV: ICD-10-CM

## 2017-01-25 DIAGNOSIS — N18.9 ANEMIA IN CHRONIC KIDNEY DISEASE(285.21): ICD-10-CM

## 2017-01-25 LAB
ALBUMIN SERPL BCP-MCNC: 2.7 G/DL
ANION GAP SERPL CALC-SCNC: 7 MMOL/L
BASOPHILS # BLD AUTO: 0.03 K/UL
BASOPHILS NFR BLD: 0.6 %
BUN SERPL-MCNC: 58 MG/DL
CALCIUM SERPL-MCNC: 8.3 MG/DL
CHLORIDE SERPL-SCNC: 100 MMOL/L
CO2 SERPL-SCNC: 28 MMOL/L
CREAT CL/1.73 SQ M 12H UR+SERPL-ARVRAT: 25 ML/MIN
CREAT SERPL-MCNC: 2.7 MG/DL
CREAT SERPL-MCNC: 2.7 MG/DL
CREAT UR-MCNC: 52.4 MG/DL
CREATININE, URINE (MG/SPEC): 969.4 MG/SPEC
DIFFERENTIAL METHOD: ABNORMAL
EOSINOPHIL # BLD AUTO: 0.1 K/UL
EOSINOPHIL NFR BLD: 2.8 %
ERYTHROCYTE [DISTWIDTH] IN BLOOD BY AUTOMATED COUNT: 14.4 %
EST. GFR  (AFRICAN AMERICAN): 28 ML/MIN/1.73 M^2
EST. GFR  (NON AFRICAN AMERICAN): 24 ML/MIN/1.73 M^2
GLUCOSE SERPL-MCNC: 216 MG/DL
HCT VFR BLD AUTO: 27.1 %
HGB BLD-MCNC: 8.4 G/DL
LYMPHOCYTES # BLD AUTO: 0.5 K/UL
LYMPHOCYTES NFR BLD: 9.7 %
MCH RBC QN AUTO: 28.4 PG
MCHC RBC AUTO-ENTMCNC: 31 %
MCV RBC AUTO: 92 FL
MONOCYTES # BLD AUTO: 0.4 K/UL
MONOCYTES NFR BLD: 7 %
NEUTROPHILS # BLD AUTO: 4 K/UL
NEUTROPHILS NFR BLD: 79.7 %
PHOSPHATE SERPL-MCNC: 3.9 MG/DL
PLATELET # BLD AUTO: 255 K/UL
PMV BLD AUTO: 8.7 FL
POTASSIUM SERPL-SCNC: 4.3 MMOL/L
PROT 24H UR-MRATE: 3811 MG/SPEC
PROT UR-MCNC: 206 MG/DL
PTH-INTACT SERPL-MCNC: 181 PG/ML
RBC # BLD AUTO: 2.96 M/UL
SODIUM SERPL-SCNC: 135 MMOL/L
URINE COLLECTION DURATION: 24 HR
URINE COLLECTION DURATION: 24 HR
URINE VOLUME: 1850 ML
URINE VOLUME: 1850 ML
WBC # BLD AUTO: 5.03 K/UL

## 2017-01-25 PROCEDURE — 36415 COLL VENOUS BLD VENIPUNCTURE: CPT

## 2017-01-25 PROCEDURE — 84100 ASSAY OF PHOSPHORUS: CPT

## 2017-01-25 PROCEDURE — 83970 ASSAY OF PARATHORMONE: CPT

## 2017-01-25 PROCEDURE — 85025 COMPLETE CBC W/AUTO DIFF WBC: CPT

## 2017-01-25 PROCEDURE — 80048 BASIC METABOLIC PNL TOTAL CA: CPT

## 2017-01-25 PROCEDURE — 82575 CREATININE CLEARANCE TEST: CPT

## 2017-01-25 PROCEDURE — 84156 ASSAY OF PROTEIN URINE: CPT

## 2017-01-25 PROCEDURE — 82040 ASSAY OF SERUM ALBUMIN: CPT

## 2017-01-26 ENCOUNTER — OFFICE VISIT (OUTPATIENT)
Dept: NEUROLOGY | Facility: CLINIC | Age: 67
End: 2017-01-26
Payer: MEDICARE

## 2017-01-26 VITALS
HEART RATE: 78 BPM | WEIGHT: 191 LBS | SYSTOLIC BLOOD PRESSURE: 176 MMHG | HEIGHT: 63 IN | DIASTOLIC BLOOD PRESSURE: 93 MMHG | BODY MASS INDEX: 33.84 KG/M2

## 2017-01-26 DIAGNOSIS — I63.9 LEFT PONTINE STROKE: Primary | ICD-10-CM

## 2017-01-26 DIAGNOSIS — E11.65 TYPE 2 DIABETES MELLITUS WITH HYPERGLYCEMIA, WITH LONG-TERM CURRENT USE OF INSULIN: ICD-10-CM

## 2017-01-26 DIAGNOSIS — Z79.4 TYPE 2 DIABETES MELLITUS WITH HYPERGLYCEMIA, WITH LONG-TERM CURRENT USE OF INSULIN: ICD-10-CM

## 2017-01-26 DIAGNOSIS — I10 ESSENTIAL HYPERTENSION: ICD-10-CM

## 2017-01-26 PROCEDURE — 99215 OFFICE O/P EST HI 40 MIN: CPT | Mod: S$PBB,,, | Performed by: PSYCHIATRY & NEUROLOGY

## 2017-01-26 PROCEDURE — 99999 PR PBB SHADOW E&M-EST. PATIENT-LVL III: CPT | Mod: PBBFAC,,, | Performed by: PSYCHIATRY & NEUROLOGY

## 2017-01-26 PROCEDURE — 99213 OFFICE O/P EST LOW 20 MIN: CPT | Mod: PBBFAC | Performed by: PSYCHIATRY & NEUROLOGY

## 2017-01-26 NOTE — PROGRESS NOTES
Vascular Neurology  Clinic Note      Reason for Consult (Chief Complaint): stroke    SUBJECTIVE:     History of Present Illness:  Patient is a 66 y.o. male who presents to clinic today as a hospital discharge follow up after a stroke in [place] (left side) in November 2016. Presented with right side weakness, sudden onset.  No clear triggers.  Found to have lesion on MRI.  Likely small vessel stroke. Symptoms much improved after discharge.      Past Medical History   Diagnosis Date    CHF (congestive heart failure)     Diabetes mellitus     Hypertension     PVD (peripheral vascular disease)     Stroke      Lt pontine stroke 11/15/2016     Past Surgical History   Procedure Laterality Date    Toe amputation Left      5th toe     Family History   Problem Relation Age of Onset    Stroke Mother     Diabetes Father      Social History   Substance Use Topics    Smoking status: Never Smoker    Smokeless tobacco: None    Alcohol use No     Review of patient's allergies indicates:  No Known Allergies    Medications:   I have reviewed the current medication administration record.  For a complete list of medications please see the medication list.    Review of Systems:  Constitutional: no fever, no chills, no fatigue  Eyes: no eyesight worsening, no double vision, no eye pain  ENT/Mouth: no nasal congestion or nose bleeds, no sore throat or hoarseness.  Cardiovascular: no chest pain w/exertion, no palpitations, no leg pain while walking, no irregular heartbeat  Respiratory: no cough or shortness of breath, no coughing up blood  Gastrointestinal: no nausea or vomiting, no abdominal pain or change in bowel habits, no black/bloody stools  Genitourinary: no frequent voiding or blood in urine  Musculoskeletal: no joint pain, no muscle pain   Skin/Breast: negative for rash or skin lesions  Hematologic: no easy bruising  Neurological: no headaches, dizziness, or confusion  Sleep: negative for snoring or breath  "holding  Psychiatric: no auditory or visual hallucinations, no anxiety, no depression/worrying alot  Endocrine: no heat or cold intolerance, no excessive thirst    OBJECTIVE:     Vital Signs (Most Recent):  Visit Vitals    BP (!) 176/93 (BP Location: Left arm, Patient Position: Sitting, BP Method: Automatic)    Pulse 78    Ht 5' 3" (1.6 m)    Wt 86.6 kg (191 lb)    BMI 33.83 kg/m2       Physical Exam:  General: well developed, well nourished  Head/Neck: atraumatic, thyroid normal, no palpable mass  Eyes: fundus normal, no disc edema, no vessel changes  Respiratory: clear to auscultation  Vascular: no carotid bruits  Cardiac: regular rate and rhythm  Abdomen: soft, non-tender  Skin: negative for bruises    Scores:  NIHSS: 0 (01/26/17 1446)   ABCD2:     Modified [place]: No Significant Disability (01/26/17 1446)   CHADS2:       Neurological Exam:   LOC: alert and follows requests  Language: No aphasia  Speech: No dysarthria  Orientation: Person, Place, Time  Memory: Recent memory intact, Remote memory intact, Age correct, Month correct  Visual Fields (CN II): Full  EOM (CN III, IV, VI): Full/intact  Oculocephalics: normal  Pupils (CN III, IV, VI): PERRL  Facial Sensation (CN V): Symmetric, Corneal reflex intact  Facial Movement (CN VII): symmetric facial expression  Hearing (CN VIII): intact bilaterally  Gag Reflex (CN IX, X): normal/symmetric  Shoulder/Neck (CN XI): SCM-Left: Normal ; SCM-Right: Normal ; Shoulder Shrug: Normal/Symetric  Tongue (CN XII): to midline  Reflexes: flexor plantar responses bilaterally and 2+ throughout  Motor*: Arm Left:  Normal (5/5), Leg Left:   Normal (5/5), Arm Right:   Normal (5/5), Leg Right:   Paretic:  4/5  Cerebellar*: Normal limb, Normal gait  , Normal stance    Laboratory:  BMP:   Lab Results   Component Value Date     (L) 01/25/2017    K 4.3 01/25/2017     01/25/2017    CO2 28 01/25/2017    BUN 58 (H) 01/25/2017    CREATININE 2.7 (H) 01/25/2017    CALCIUM 8.3 (L) " 01/25/2017     CBC:   Lab Results   Component Value Date    WBC 5.03 01/25/2017    RBC 2.96 (L) 01/25/2017    HGB 8.4 (L) 01/25/2017    HCT 27.1 (L) 01/25/2017     01/25/2017    MCV 92 01/25/2017    MCH 28.4 01/25/2017    MCHC 31.0 (L) 01/25/2017     Lipid Panel:   Lab Results   Component Value Date    CHOL 183 11/15/2016    LDLCALC 126.6 11/15/2016    HDL 27 (L) 11/15/2016    TRIG 147 11/15/2016     Coagulation:   Lab Results   Component Value Date    INR 1.1 01/07/2017    INR 1.3 01/03/2017     Hgb A1C:   Lab Results   Component Value Date    HGBA1C 10.5 (H) 11/15/2016     TSH:   Lab Results   Component Value Date    TSH 1.872 11/15/2016       Diagnostic Results:  Brain Imaging: MRI Head. Date: 11.2016  Acute Pathology: Infarct  Location: [place]:  left  Old Vascular Pathology: Microvascular disease  Location: Diffuse    ASSESSMENT/PLAN:     Diagnosis:  Intracranial thrombosis with stroke (I63.30)  Stroke Risk Factors:  I10 Hypertension, E11.65 Diabetes uncontrolled, unspecified  Effects of Stroke:  Mild R leg weakness    Recommendations:  Continue rehab efforts  Aggressive risk factor modification    Jordy Diaz MD  Vascular and Interventional Neurology Staff  Director of [facility]  [facility]  [phone]

## 2017-01-26 NOTE — LETTER
January 26, 2017      Oli Oscar MD  [address] [facility] [address]           [facility]  [address]  [place] [address]  Phone: [phone]          Patient: Ambrocio Chin   MR Number: [id number]   YOB: 1950   Date of Visit: 1/26/2017       Dear Dr. Oli Oscar:    Thank you for referring Ambrocio Chin to me for evaluation. Attached you will find relevant portions of my assessment and plan of care.    If you have questions, please do not hesitate to call me. I look forward to following Ambrocio Chin along with you.    Sincerely,    Jordy Diaz MD    Enclosure  CC:  No Recipients    If you would like to receive this communication electronically, please contact [email or web address] or [phone] to request more information on [facility] access.    For providers and/or their staff who would like to refer a patient to [facility], please contact us through our one-stop-shop provider referral line, [facility], at [phone].    If you feel you have received this communication in error or would no longer like to receive these types of communications, please e-mail [email or web address]

## 2017-01-31 ENCOUNTER — OFFICE VISIT (OUTPATIENT)
Dept: NEUROLOGY | Facility: CLINIC | Age: 67
End: 2017-01-31
Payer: MEDICARE

## 2017-01-31 VITALS
SYSTOLIC BLOOD PRESSURE: 137 MMHG | BODY MASS INDEX: 28.37 KG/M2 | HEIGHT: 68 IN | HEART RATE: 75 BPM | WEIGHT: 187.19 LBS | DIASTOLIC BLOOD PRESSURE: 77 MMHG

## 2017-01-31 DIAGNOSIS — I63.9 ISCHEMIC STROKE: Primary | ICD-10-CM

## 2017-01-31 PROCEDURE — 99213 OFFICE O/P EST LOW 20 MIN: CPT | Mod: PBBFAC,PO | Performed by: PSYCHIATRY & NEUROLOGY

## 2017-01-31 PROCEDURE — 99214 OFFICE O/P EST MOD 30 MIN: CPT | Mod: S$PBB,,, | Performed by: PSYCHIATRY & NEUROLOGY

## 2017-01-31 PROCEDURE — 99999 PR PBB SHADOW E&M-EST. PATIENT-LVL III: CPT | Mod: PBBFAC,,, | Performed by: PSYCHIATRY & NEUROLOGY

## 2017-01-31 NOTE — PATIENT INSTRUCTIONS
Síntomas de un ataque cerebral  Rodrigo un ataque cerebral, la shaw julee de circular en parte del cerebro, lo que puede dañar zonas del cerebro que controlan el ivelisse del cuerpo. Busque ayuda de inmediato si se presenta repentinamente alguno de estos síntomas, aunque yovani poco tiempo.  Conozca los síntomas de un ataque cerebral     Estevan sensación repentina de debilidad de un lado de paulson cuerpo puede ser estevan señal de que está teniendo un ataque cerebral.   · Debilidad. Usted puede sentir debilidad, hormigueo o pérdida de sensibilidad repentinos de un lado de paulson chinyere o cuerpo, incluso paulson brazo o pierna.   · Problemas de visión. Puede presentar visión doble o problemas para lauro en linh o los dos ojos.  · Problemas para hablar. Es posible que tenga problemas repentinos para hablar, para pronunciar las palabras o para comprender a otras personas.  · Dolor de krista. Puede presentar un dolor de krista repentino muy danette.  · Problemas de movimiento. Puede tener de repente problemas para caminar o experimentar mareo, estevan sensación de que todo le da vueltas, pérdida del equilibrio, sensación de estar cayendo o desmayos.  · Convulsión. También puede tener estevan convulsión en cami de un ataque cerebral hemorrágico o que afecte estevan delta kate del cerebro.     RECUERDE: Si tiene cualquiera de esos síntomas, llame al 911 y a paulson médico lo antes posible.               Hay dalia signos principales que le indicarán que puede tratarse de un ataque cerebral. Si los ve, tiene que llamar al 911 rápido. Estos signos son:  Caída de la chinyere: Un lado de la chinyere se  o está adormecido. Cuando la persona sonríe, paulson sonrisa no es uniforme. Debilidad en un brazo: Un brazo está débil o adormecido. Cuando la persona levanta ambos brazos al mismo tiempo, linh puede caerse.  Dificultad para hablar: Puede notar que la persona tiene problemas para hablar o que arrastra las palabras. La persona no puede repetir correctamente estevan oración simple  cuando se lo piden.  Momento de llamar al 911: Si alguien tiene cualquiera de esos síntomas, incluso aunque desaparezcan, llamen al 911 de inmediato. Ute nota de la hora en la que aparecieron los síntomas por primera vez.  © 9607-9692 [employer]. [address] [place] [address], [place] [address]. Todos los derechos reservados. Esta información no pretende sustituir la atención médica profesional. Sólo paulson médico puede diagnosticar y tratar un problema de phillip.

## 2017-01-31 NOTE — PROGRESS NOTES
[facility] NEUROLOGY  [facility]    Date: January 31, 2017   Patient Name: Ambrocio Chin   MRN: [id number]   PCP: [facility]  Referring Provider: Mau Hewitt MD    Assessment:      This is Ambrocio Chin, 66 y.o. male with a history of A left pontine ischemic stroke suffered in November 2016 who presents in follow-up.  The patient has rehabbed well and has completed all necessary risk stratification.  He does not smoke.  I completed education on stroke warning sign swith the patient today as well as risk reduction and he expressed understanding of this.  All questions were answered.     Plan:      * L pontine ischemic stroke  - continue ASA 81 mg daily  - Fasting lipid panel with  (goal <70 per ATIII guidelines) Hgb A1c 10.5 on 11/15/16, and TSH WNL  - patient on crestor 20 mg daily  - EKG reviewed, possible old infarct  -- TTE reviewed, diastolic dysfunction  - carotid dopplers reviewed, WNL, L side s/p endarterectomy  - PT/OT/SLP consults completed   - patient does not smoke  - patient may follow up as needed     Louis Block MD  [facility]   Department of Neurology    Patient note was created using Dragon Dictation.  Any errors in syntax or even information may not have been identified and edited on initial review prior to signing this note.  Subjective:   Patient seen in consultation at the request of Dr. Crowley for the evaluation of ischemic stroke. A copy of this note will be sent to the referring physician.      HPI:   Mr. Ambrocio Chin is a 66 y.o. male who presents with a chief complaint of a history of left pontine stroke.  The patient suffered a left pontine stroke with associated Right sided weakness.  He underwent a full risk stratification and evaluation during his initial hospitalization as well as rehabilitation.  The patient states that he has been doing very well and is walking longer distances and no longer requires use of a walker.   He remains compliant with his aspirin and denies any further stroke like or TIA symptoms.  He denies any new focal neurologic deficit feels the strength in hiupper and lower extremities are improving substantially.  He has no other questions or concerns today.    PAST MEDICAL HISTORY:  Past Medical History   Diagnosis Date    CHF (congestive heart failure)     Diabetes mellitus     Hypertension     PVD (peripheral vascular disease)     Stroke      Lt pontine stroke 11/15/2016     PAST SURGICAL HISTORY:  Past Surgical History   Procedure Laterality Date    Toe amputation Left      5th toe     CURRENT MEDS:  Current Outpatient Prescriptions   Medication Sig Dispense Refill    amlodipine (NORVASC) 10 MG tablet Take 1 tablet (10 mg total) by mouth every evening. 90 tablet 3    aspirin (ECOTRIN) 81 MG EC tablet Take 1 tablet (81 mg total) by mouth once daily.  0    blood sugar diagnostic Strp 100 strips by Misc.(Non-Drug; Combo Route) route once daily. 100 strip 11    carvedilol (COREG) 12.5 MG tablet Take 1 tablet (12.5 mg total) by mouth 2 (two) times daily. 180 tablet 3    citalopram (CELEXA) 20 MG tablet Take 1 tablet (20 mg total) by mouth once daily. 30 tablet 3    ergocalciferol (ERGOCALCIFEROL) 50,000 unit Cap Take 1 capsule (50,000 Units total) by mouth every 7 days. 8 capsule 0    ferrous sulfate 325 (65 FE) MG EC tablet Take 1 tablet (325 mg total) by mouth once daily.  0    furosemide (LASIX) 80 MG tablet Take 1 tablet (80 mg total) by mouth every 8 (eight) hours. 90 tablet 11    hydrALAZINE (APRESOLINE) 50 MG tablet Take 1 tablet (50 mg total) by mouth 3 (three) times daily. 90 tablet 3    insulin glargine (LANTUS) 100 unit/mL injection Inject 12 Units into the skin every evening. 10.8 mL 0    lisinopril (PRINIVIL,ZESTRIL) 40 MG tablet Take 1 tablet (40 mg total) by mouth once daily. 90 tablet 0    metOLazone (ZAROXOLYN) 10 MG tablet Take 1 tablet (10 mg total) by mouth once daily. 30  "tablet 11    nut.tx.impaired renal fxn,soy 0.04-1.79 gram-kcal/mL Liqd 1 Can with Meals Three Times a Day 30 Can 3    rosuvastatin (CRESTOR) 20 MG tablet Take 2 tablets (40 mg total) by mouth once daily. 30 tablet 3    trazodone (DESYREL) 50 MG tablet Take 1 tablet (50 mg total) by mouth every evening. 30 tablet 3    vitamin renal formula, B-complex-vitamin c-folic acid, (NEPHROCAP) 1 mg Cap Take 1 capsule by mouth once daily. 90 capsule 3     No current facility-administered medications for this visit.      ALLERGIES:  Review of patient's allergies indicates:  No Known Allergies    FAMILY HISTORY:  Family History   Problem Relation Age of Onset    Stroke Mother     Diabetes Father      SOCIAL HISTORY:  Social History   Substance Use Topics    Smoking status: Never Smoker    Smokeless tobacco: None    Alcohol use No     Review of Systems:  12 review of systems is negative except for the symptoms mentioned in HPI.      Objective:     Vitals:    01/31/17 0920   BP: 137/77   Pulse: 75   Weight: 84.9 kg (187 lb 2.7 oz)   Height: 5' 8" (1.727 m)     General: NAD, well nourished   Eyes: no tearing, discharge, no erythema   ENT: moist mucous membranes of the oral cavity, nares patent    Neck: Supple, full range of motion  Cardiovascular: Warm and well perfused, pulses equal and symmetrical  Lungs: Normal work of breathing, normal chest wall excursions  Skin: No rash, lesions, or breakdown on exposed skin  Psychiatry: Mood and affect are appropriate   Abdomen: soft, non tender, non distended  Extremeties: No cyanosis, clubbing or edema.    Neurological   MENTAL STATUS: Alert and oriented to person, place, and time. Attention and concentration within normal limits. Speech without dysarthria, able to name and repeat without difficulty. Recent and remote memory within normal limits   CRANIAL NERVES: Visual fields intact. PERRL. EOMI. Facial sensation intact. Face symmetrical. Hearing grossly intact. Full shoulder " shrug bilaterally. Tongue protrudes midline   SENSORY: Sensation is intact to light touch throughout.  JNegative Romberg.   MOTOR: Normal bulk and tone. No pronator drift.  5/5 deltoid, biceps, triceps, interosseous, hand  bilaterally. 5-/5 iliopsoas, knee extension/flexion, foot dorsi/plantarflexion bilaterally.    REFLEXES: Symmetric and 2+ throughout. T  CEREBELLAR/COORDINATION/GAIT: Gait steady with normal arm swing and stride length. Finger to nose intact. Normal rapid alternating movements.

## 2017-01-31 NOTE — MR AVS SNAPSHOT
[place] - Neurology  [address] [place] [address] Ave  [place] [address]  Phone: [phone]  Fax: [phone]                  Ambrocio Chin   2017 9:00 AM   Office Visit    Description:  Male : 1950   Provider:  Louis Block MD   Department:  [place] - Neurology           Reason for Visit     Transient Ischemic Attack                To Do List           Future Appointments        Provider Department Dept Phone    2/10/2017 8:00 AM HOME MONITOR DEVICE CHECK, [facility] [address] - Arrhythmia [phone]      Goals (5 Years of Data)     None      Follow-Up and Disposition     Return if symptoms worsen or fail to improve.      [facility]     [facility] Nurse Care Line -  Assistance  Registered nurses in the [facility] provide clinical advisement, health education, appointment booking, and other advisory services.  Call for this free service at [phone].             Medications           Message regarding Medications     Verify the changes and/or additions to your medication regime listed below are the same as discussed with your clinician today.  If any of these changes or additions are incorrect, please notify your healthcare provider.             Verify that the below list of medications is an accurate representation of the medications you are currently taking.  If none reported, the list may be blank. If incorrect, please contact your healthcare provider. Carry this list with you in case of emergency.           Current Medications     amlodipine (NORVASC) 10 MG tablet Take 1 tablet (10 mg total) by mouth every evening.    aspirin (ECOTRIN) 81 MG EC tablet Take 1 tablet (81 mg total) by mouth once daily.    blood sugar diagnostic Strp 100 strips by Misc.(Non-Drug; Combo Route) route once daily.    carvedilol (COREG) 12.5 MG tablet Take 1 tablet (12.5 mg total) by mouth 2 (two) times daily.    citalopram (CELEXA) 20 MG tablet Take 1 tablet (20 mg total) by mouth once daily.     "ergocalciferol (ERGOCALCIFEROL) 50,000 unit Cap Take 1 capsule (50,000 Units total) by mouth every 7 days.    ferrous sulfate 325 (65 FE) MG EC tablet Take 1 tablet (325 mg total) by mouth once daily.    furosemide (LASIX) 80 MG tablet Take 1 tablet (80 mg total) by mouth every 8 (eight) hours.    hydrALAZINE (APRESOLINE) 50 MG tablet Take 1 tablet (50 mg total) by mouth 3 (three) times daily.    insulin glargine (LANTUS) 100 unit/mL injection Inject 12 Units into the skin every evening.    lisinopril (PRINIVIL,ZESTRIL) 40 MG tablet Take 1 tablet (40 mg total) by mouth once daily.    metOLazone (ZAROXOLYN) 10 MG tablet Take 1 tablet (10 mg total) by mouth once daily.    nut.tx.impaired renal fxn,soy 0.04-1.79 gram-kcal/mL Liqd 1 Can with Meals Three Times a Day    rosuvastatin (CRESTOR) 20 MG tablet Take 2 tablets (40 mg total) by mouth once daily.    trazodone (DESYREL) 50 MG tablet Take 1 tablet (50 mg total) by mouth every evening.    vitamin renal formula, B-complex-vitamin c-folic acid, (NEPHROCAP) 1 mg Cap Take 1 capsule by mouth once daily.           Clinical Reference Information           Vital Signs - Last Recorded  Most recent update: 1/31/2017  9:21 AM by Norm Harvey MA    BP Pulse Ht Wt BMI    137/77 75 5' 8" (1.727 m) 84.9 kg (187 lb 2.7 oz) 28.46 kg/m2      Blood Pressure          Most Recent Value    BP  137/77      Allergies as of 1/31/2017     No Known Allergies      Immunizations Administered on Date of Encounter - 1/31/2017     None      Instructions      Síntomas de un ataque cerebral  Rodrigo un ataque cerebral, la shaw julee de circular en parte del cerebro, lo que puede dañar zonas del cerebro que controlan el ivelisse del cuerpo. Busque ayuda de inmediato si se presenta repentinamente alguno de estos síntomas, aunque yovani poco tiempo.  Conozca los síntomas de un ataque cerebral     Estevan sensación repentina de debilidad de un lado de paulson cuerpo puede ser estevan señal de que está teniendo un " ataque cerebral.   · Debilidad. Usted puede sentir debilidad, hormigueo o pérdida de sensibilidad repentinos de un lado de paulson chinyere o cuerpo, incluso paulson brazo o pierna.   · Problemas de visión. Puede presentar visión doble o problemas para lauro en linh o los dos ojos.  · Problemas para hablar. Es posible que tenga problemas repentinos para hablar, para pronunciar las palabras o para comprender a otras personas.  · Dolor de krista. Puede presentar un dolor de krista repentino muy danette.  · Problemas de movimiento. Puede tener de repente problemas para caminar o experimentar mareo, estevan sensación de que todo le da vueltas, pérdida del equilibrio, sensación de estar cayendo o desmayos.  · Convulsión. También puede tener estevan convulsión en cami de un ataque cerebral hemorrágico o que afecte estevan delta kate del cerebro.     RECUERDE: Si tiene cualquiera de esos síntomas, llame al 911 y a paulson médico lo antes posible.               Hay dalia signos principales que le indicarán que puede tratarse de un ataque cerebral. Si los ve, tiene que llamar al 911 rápido. Estos signos son:  Caída de la chinyere: Un lado de la chinyere se  o está adormecido. Cuando la persona sonríe, paulson sonrisa no es uniforme. Debilidad en un brazo: Un brazo está débil o adormecido. Cuando la persona levanta ambos brazos al mismo tiempo, linh puede caerse.  Dificultad para hablar: Puede notar que la persona tiene problemas para hablar o que arrastra las palabras. La persona no puede repetir correctamente estevan oración simple cuando se lo piden.  Momento de llamar al 911: Si alguien tiene cualquiera de esos síntomas, incluso aunque desaparezcan, llamen al 911 de inmediato. [place] nota de la hora en la que aparecieron los síntomas por primera vez.  © 8085-6424 [employer]. [address] [place] [address], [place] [address]. Todos los derechos reservados. Esta información no pretende sustituir la atención médica profesional. Sólo paulson médico puede diagnosticar y  tratar un problema de phillip.

## 2017-01-31 NOTE — LETTER
January 31, 2017      Mau Hewitt MD  [address] [facility] [address]  [address]  [facility] [address]           [facility] Neurology  [address] [place] [address]  Phone: [phone]  Fax: [phone]          Patient: Ambrocio Chin   MR Number: [id number]   YOB: 1950   Date of Visit: 1/31/2017       Dear Dr. Mau Hewitt:    Thank you for referring Ambrocio Chin to me for evaluation. Attached you will find relevant portions of my assessment and plan of care.    If you have questions, please do not hesitate to call me. I look forward to following Ambrocio Chin along with you.    Sincerely,    Louis Block MD    Enclosure  CC:  No Recipients    If you would like to receive this communication electronically, please contact [email or web address] or [phone] to request more information on [facility] Link access.    For providers and/or their staff who would like to refer a patient to [facility], please contact us through our one-stop-shop provider referral line, [facility], at [phone].    If you feel you have received this communication in error or would no longer like to receive these types of communications, please e-mail [email or web address]

## 2017-02-10 ENCOUNTER — CLINICAL SUPPORT (OUTPATIENT)
Dept: ELECTROPHYSIOLOGY | Facility: CLINIC | Age: 67
End: 2017-02-10
Payer: MEDICARE

## 2017-02-10 DIAGNOSIS — I63.9 CRYPTOGENIC STROKE: ICD-10-CM

## 2017-02-10 DIAGNOSIS — Z95.818 STATUS POST PLACEMENT OF IMPLANTABLE LOOP RECORDER: ICD-10-CM

## 2017-02-10 PROCEDURE — 93299 LOOP RECORDER REMOTE: CPT | Mod: PBBFAC | Performed by: INTERNAL MEDICINE

## 2017-02-10 PROCEDURE — 93297 REM INTERROG DEV EVAL ICPMS: CPT | Mod: ,,, | Performed by: INTERNAL MEDICINE

## 2017-03-20 ENCOUNTER — CLINICAL SUPPORT (OUTPATIENT)
Dept: ELECTROPHYSIOLOGY | Facility: CLINIC | Age: 67
End: 2017-03-20
Payer: MEDICARE

## 2017-03-20 DIAGNOSIS — Z95.818 STATUS POST PLACEMENT OF IMPLANTABLE LOOP RECORDER: ICD-10-CM

## 2017-03-20 DIAGNOSIS — I63.9 CRYPTOGENIC STROKE: ICD-10-CM

## 2017-03-20 PROCEDURE — 93297 REM INTERROG DEV EVAL ICPMS: CPT | Mod: ,,, | Performed by: INTERNAL MEDICINE

## 2017-03-20 PROCEDURE — 93299 LOOP RECORDER REMOTE: CPT | Mod: PBBFAC | Performed by: INTERNAL MEDICINE

## 2017-03-23 ENCOUNTER — LAB VISIT (OUTPATIENT)
Dept: LAB | Facility: HOSPITAL | Age: 67
End: 2017-03-23
Attending: INTERNAL MEDICINE
Payer: MEDICARE

## 2017-03-23 DIAGNOSIS — E11.9 DIABETES MELLITUS, TYPE 2: ICD-10-CM

## 2017-03-23 DIAGNOSIS — N18.9 ANEMIA IN CHRONIC KIDNEY DISEASE(285.21): ICD-10-CM

## 2017-03-23 DIAGNOSIS — N28.1 SIMPLE RENAL CYST: ICD-10-CM

## 2017-03-23 DIAGNOSIS — D63.1 ANEMIA IN CHRONIC KIDNEY DISEASE(285.21): ICD-10-CM

## 2017-03-23 DIAGNOSIS — N18.4 CKD (CHRONIC KIDNEY DISEASE), STAGE IV: Primary | ICD-10-CM

## 2017-03-23 DIAGNOSIS — I10 ESSENTIAL (PRIMARY) HYPERTENSION: ICD-10-CM

## 2017-03-23 DIAGNOSIS — E87.5 HYPERKALEMIA: ICD-10-CM

## 2017-03-23 LAB
ALBUMIN SERPL BCP-MCNC: 2.7 G/DL
ANION GAP SERPL CALC-SCNC: 7 MMOL/L
BASOPHILS # BLD AUTO: 0.02 K/UL
BASOPHILS NFR BLD: 0.3 %
BUN SERPL-MCNC: 52 MG/DL
CALCIUM SERPL-MCNC: 8.3 MG/DL
CHLORIDE SERPL-SCNC: 106 MMOL/L
CO2 SERPL-SCNC: 22 MMOL/L
CREAT SERPL-MCNC: 2.6 MG/DL
DIFFERENTIAL METHOD: ABNORMAL
EOSINOPHIL # BLD AUTO: 0.3 K/UL
EOSINOPHIL NFR BLD: 5.2 %
ERYTHROCYTE [DISTWIDTH] IN BLOOD BY AUTOMATED COUNT: 13.7 %
EST. GFR  (AFRICAN AMERICAN): 29 ML/MIN/1.73 M^2
EST. GFR  (NON AFRICAN AMERICAN): 25 ML/MIN/1.73 M^2
GLUCOSE SERPL-MCNC: 326 MG/DL
HCT VFR BLD AUTO: 27.1 %
HGB BLD-MCNC: 9.1 G/DL
LYMPHOCYTES # BLD AUTO: 0.7 K/UL
LYMPHOCYTES NFR BLD: 11.3 %
MCH RBC QN AUTO: 28.9 PG
MCHC RBC AUTO-ENTMCNC: 33.6 %
MCV RBC AUTO: 86 FL
MONOCYTES # BLD AUTO: 0.3 K/UL
MONOCYTES NFR BLD: 5.9 %
NEUTROPHILS # BLD AUTO: 4.5 K/UL
NEUTROPHILS NFR BLD: 77.1 %
PHOSPHATE SERPL-MCNC: 4.2 MG/DL
PLATELET # BLD AUTO: 240 K/UL
PMV BLD AUTO: 9 FL
POTASSIUM SERPL-SCNC: 4.6 MMOL/L
PTH-INTACT SERPL-MCNC: 140 PG/ML
RBC # BLD AUTO: 3.15 M/UL
SODIUM SERPL-SCNC: 135 MMOL/L
WBC # BLD AUTO: 5.77 K/UL

## 2017-03-23 PROCEDURE — 82040 ASSAY OF SERUM ALBUMIN: CPT

## 2017-03-23 PROCEDURE — 83036 HEMOGLOBIN GLYCOSYLATED A1C: CPT

## 2017-03-23 PROCEDURE — 80048 BASIC METABOLIC PNL TOTAL CA: CPT

## 2017-03-23 PROCEDURE — 83970 ASSAY OF PARATHORMONE: CPT

## 2017-03-23 PROCEDURE — 36415 COLL VENOUS BLD VENIPUNCTURE: CPT

## 2017-03-23 PROCEDURE — 84100 ASSAY OF PHOSPHORUS: CPT

## 2017-03-23 PROCEDURE — 85025 COMPLETE CBC W/AUTO DIFF WBC: CPT

## 2017-03-24 ENCOUNTER — LAB VISIT (OUTPATIENT)
Dept: LAB | Facility: HOSPITAL | Age: 67
End: 2017-03-24
Attending: INTERNAL MEDICINE
Payer: MEDICARE

## 2017-03-24 DIAGNOSIS — D63.1 ANEMIA IN CHRONIC KIDNEY DISEASE(285.21): ICD-10-CM

## 2017-03-24 DIAGNOSIS — N18.4 CKD (CHRONIC KIDNEY DISEASE), STAGE IV: ICD-10-CM

## 2017-03-24 DIAGNOSIS — E11.9 TYPE II DIABETES MELLITUS: ICD-10-CM

## 2017-03-24 DIAGNOSIS — I10 ESSENTIAL (PRIMARY) HYPERTENSION: ICD-10-CM

## 2017-03-24 DIAGNOSIS — D64.9 ANEMIA: ICD-10-CM

## 2017-03-24 DIAGNOSIS — N18.9 ANEMIA IN CHRONIC KIDNEY DISEASE(285.21): ICD-10-CM

## 2017-03-24 DIAGNOSIS — E87.5 HYPERKALEMIA: ICD-10-CM

## 2017-03-24 DIAGNOSIS — N28.1 SIMPLE RENAL CYST: ICD-10-CM

## 2017-03-24 LAB
CREAT CL/1.73 SQ M 12H UR+SERPL-ARVRAT: 22 ML/MIN
CREAT SERPL-MCNC: 2.7 MG/DL
CREAT UR-MCNC: 74.7 MG/DL
CREATININE, URINE (MG/SPEC): 859.1 MG/SPEC
ESTIMATED AVG GLUCOSE: 226 MG/DL
HBA1C MFR BLD HPLC: 9.5 %
PROT 24H UR-MRATE: 4232 MG/SPEC
PROT UR-MCNC: 368 MG/DL
URINE COLLECTION DURATION: 24 HR
URINE COLLECTION DURATION: 24 HR
URINE VOLUME: 1150 ML
URINE VOLUME: 1150 ML

## 2017-03-24 PROCEDURE — 82575 CREATININE CLEARANCE TEST: CPT

## 2017-03-24 PROCEDURE — 84156 ASSAY OF PROTEIN URINE: CPT

## 2017-04-24 ENCOUNTER — CLINICAL SUPPORT (OUTPATIENT)
Dept: ELECTROPHYSIOLOGY | Facility: CLINIC | Age: 67
End: 2017-04-24
Payer: MEDICARE

## 2017-04-24 DIAGNOSIS — I63.9 CRYPTOGENIC STROKE: ICD-10-CM

## 2017-04-24 DIAGNOSIS — Z95.818 STATUS POST PLACEMENT OF IMPLANTABLE LOOP RECORDER: ICD-10-CM

## 2017-04-24 PROCEDURE — 93299 LOOP RECORDER REMOTE: CPT | Mod: PBBFAC | Performed by: INTERNAL MEDICINE

## 2017-04-24 PROCEDURE — 93297 REM INTERROG DEV EVAL ICPMS: CPT | Mod: ,,, | Performed by: INTERNAL MEDICINE

## 2017-04-28 ENCOUNTER — LAB VISIT (OUTPATIENT)
Dept: LAB | Facility: HOSPITAL | Age: 67
End: 2017-04-28
Attending: INTERNAL MEDICINE
Payer: MEDICARE

## 2017-04-28 DIAGNOSIS — N18.9 ANEMIA IN CHRONIC KIDNEY DISEASE(285.21): ICD-10-CM

## 2017-04-28 DIAGNOSIS — D63.1 ANEMIA IN CHRONIC KIDNEY DISEASE(285.21): ICD-10-CM

## 2017-04-28 DIAGNOSIS — E87.5 HYPERKALEMIA: ICD-10-CM

## 2017-04-28 DIAGNOSIS — I10 ESSENTIAL (PRIMARY) HYPERTENSION: ICD-10-CM

## 2017-04-28 DIAGNOSIS — E11.9 DIABETES MELLITUS, TYPE II: ICD-10-CM

## 2017-04-28 DIAGNOSIS — N28.1 SIMPLE RENAL CYST: ICD-10-CM

## 2017-04-28 DIAGNOSIS — D64.9 CHRONIC ANEMIA: ICD-10-CM

## 2017-04-28 DIAGNOSIS — N18.4 CHRONIC KIDNEY DISEASE, STAGE IV (SEVERE): Primary | ICD-10-CM

## 2017-04-28 LAB
ALBUMIN SERPL BCP-MCNC: 3.3 G/DL
ANION GAP SERPL CALC-SCNC: 7 MMOL/L
BASOPHILS # BLD AUTO: 0.04 K/UL
BASOPHILS NFR BLD: 0.7 %
BUN SERPL-MCNC: 80 MG/DL
CALCIUM SERPL-MCNC: 9.3 MG/DL
CHLORIDE SERPL-SCNC: 99 MMOL/L
CO2 SERPL-SCNC: 29 MMOL/L
CREAT CL/1.73 SQ M 12H UR+SERPL-ARVRAT: 19 ML/MIN
CREAT SERPL-MCNC: 3.6 MG/DL
CREAT SERPL-MCNC: 3.7 MG/DL
CREAT UR-MCNC: 33.6 MG/DL
CREATININE, URINE (MG/SPEC): 1008 MG/SPEC
DIFFERENTIAL METHOD: ABNORMAL
EOSINOPHIL # BLD AUTO: 0.9 K/UL
EOSINOPHIL NFR BLD: 14.1 %
ERYTHROCYTE [DISTWIDTH] IN BLOOD BY AUTOMATED COUNT: 14.2 %
EST. GFR  (AFRICAN AMERICAN): 19 ML/MIN/1.73 M^2
EST. GFR  (NON AFRICAN AMERICAN): 16 ML/MIN/1.73 M^2
GLUCOSE SERPL-MCNC: 302 MG/DL
HCT VFR BLD AUTO: 32.7 %
HGB BLD-MCNC: 11.2 G/DL
LYMPHOCYTES # BLD AUTO: 0.7 K/UL
LYMPHOCYTES NFR BLD: 11.8 %
MCH RBC QN AUTO: 29.6 PG
MCHC RBC AUTO-ENTMCNC: 34.3 %
MCV RBC AUTO: 87 FL
MONOCYTES # BLD AUTO: 0.4 K/UL
MONOCYTES NFR BLD: 5.8 %
NEUTROPHILS # BLD AUTO: 4.1 K/UL
NEUTROPHILS NFR BLD: 67.4 %
PHOSPHATE SERPL-MCNC: 4.7 MG/DL
PLATELET # BLD AUTO: 262 K/UL
PMV BLD AUTO: 9.2 FL
POTASSIUM SERPL-SCNC: 4.4 MMOL/L
PROT 24H UR-MRATE: 3570 MG/SPEC
PROT UR-MCNC: 119 MG/DL
PTH-INTACT SERPL-MCNC: 210 PG/ML
RBC # BLD AUTO: 3.78 M/UL
SODIUM SERPL-SCNC: 135 MMOL/L
URINE COLLECTION DURATION: 24 HR
URINE COLLECTION DURATION: 24 HR
URINE VOLUME: 3000 ML
URINE VOLUME: 3000 ML
WBC # BLD AUTO: 6.01 K/UL

## 2017-04-28 PROCEDURE — 80048 BASIC METABOLIC PNL TOTAL CA: CPT

## 2017-04-28 PROCEDURE — 84156 ASSAY OF PROTEIN URINE: CPT

## 2017-04-28 PROCEDURE — 82575 CREATININE CLEARANCE TEST: CPT

## 2017-04-28 PROCEDURE — 36415 COLL VENOUS BLD VENIPUNCTURE: CPT

## 2017-04-28 PROCEDURE — 85025 COMPLETE CBC W/AUTO DIFF WBC: CPT

## 2017-04-28 PROCEDURE — 84100 ASSAY OF PHOSPHORUS: CPT

## 2017-04-28 PROCEDURE — 82040 ASSAY OF SERUM ALBUMIN: CPT

## 2017-04-28 PROCEDURE — 83970 ASSAY OF PARATHORMONE: CPT

## 2017-05-25 ENCOUNTER — CLINICAL SUPPORT (OUTPATIENT)
Dept: ELECTROPHYSIOLOGY | Facility: CLINIC | Age: 67
End: 2017-05-25
Payer: MEDICARE

## 2017-05-25 DIAGNOSIS — Z95.818 STATUS POST PLACEMENT OF IMPLANTABLE LOOP RECORDER: ICD-10-CM

## 2017-05-25 DIAGNOSIS — I63.9 CRYPTOGENIC STROKE: ICD-10-CM

## 2017-05-25 PROCEDURE — 93297 REM INTERROG DEV EVAL ICPMS: CPT | Mod: ,,, | Performed by: INTERNAL MEDICINE

## 2017-07-21 DIAGNOSIS — N18.4 CHRONIC KIDNEY DISEASE, STAGE IV (SEVERE): Primary | ICD-10-CM

## 2017-07-21 DIAGNOSIS — I10 HYPERTENSION: ICD-10-CM

## 2017-08-02 ENCOUNTER — LAB VISIT (OUTPATIENT)
Dept: LAB | Facility: HOSPITAL | Age: 67
End: 2017-08-02
Attending: INTERNAL MEDICINE
Payer: MEDICARE

## 2017-08-02 DIAGNOSIS — D63.1 ANEMIA IN CHRONIC KIDNEY DISEASE(285.21): ICD-10-CM

## 2017-08-02 DIAGNOSIS — E87.5 HYPERKALEMIA: ICD-10-CM

## 2017-08-02 DIAGNOSIS — E11.9 DIABETES MELLITUS, TYPE 2: ICD-10-CM

## 2017-08-02 DIAGNOSIS — N18.4 CHRONIC KIDNEY DISEASE, STAGE IV (SEVERE): Primary | ICD-10-CM

## 2017-08-02 DIAGNOSIS — D64.9 CHRONIC ANEMIA: ICD-10-CM

## 2017-08-02 DIAGNOSIS — N18.9 ANEMIA IN CHRONIC KIDNEY DISEASE(285.21): ICD-10-CM

## 2017-08-02 DIAGNOSIS — I10 ESSENTIAL (PRIMARY) HYPERTENSION: ICD-10-CM

## 2017-08-02 DIAGNOSIS — N28.1 SIMPLE RENAL CYST: ICD-10-CM

## 2017-08-02 LAB
ALBUMIN SERPL BCP-MCNC: 3.4 G/DL
ANION GAP SERPL CALC-SCNC: 9 MMOL/L
BASOPHILS # BLD AUTO: 0.01 K/UL
BASOPHILS NFR BLD: 0.2 %
BUN SERPL-MCNC: 110 MG/DL
CALCIUM SERPL-MCNC: 8.6 MG/DL
CHLORIDE SERPL-SCNC: 94 MMOL/L
CO2 SERPL-SCNC: 28 MMOL/L
CREAT SERPL-MCNC: 5.3 MG/DL
DIFFERENTIAL METHOD: ABNORMAL
EOSINOPHIL # BLD AUTO: 0.2 K/UL
EOSINOPHIL NFR BLD: 4.1 %
ERYTHROCYTE [DISTWIDTH] IN BLOOD BY AUTOMATED COUNT: 13.1 %
EST. GFR  (AFRICAN AMERICAN): 12 ML/MIN/1.73 M^2
EST. GFR  (NON AFRICAN AMERICAN): 11 ML/MIN/1.73 M^2
GLUCOSE SERPL-MCNC: 536 MG/DL
HCT VFR BLD AUTO: 27 %
HGB BLD-MCNC: 9.3 G/DL
LYMPHOCYTES # BLD AUTO: 0.5 K/UL
LYMPHOCYTES NFR BLD: 8.7 %
MCH RBC QN AUTO: 30.4 PG
MCHC RBC AUTO-ENTMCNC: 34.4 G/DL
MCV RBC AUTO: 88 FL
MONOCYTES # BLD AUTO: 0.4 K/UL
MONOCYTES NFR BLD: 8.2 %
NEUTROPHILS # BLD AUTO: 4.1 K/UL
NEUTROPHILS NFR BLD: 78.6 %
PHOSPHATE SERPL-MCNC: 6.3 MG/DL
PLATELET # BLD AUTO: 183 K/UL
PMV BLD AUTO: 9.2 FL
POTASSIUM SERPL-SCNC: 5.1 MMOL/L
PTH-INTACT SERPL-MCNC: 325 PG/ML
RBC # BLD AUTO: 3.06 M/UL
SODIUM SERPL-SCNC: 131 MMOL/L
WBC # BLD AUTO: 5.15 K/UL

## 2017-08-02 PROCEDURE — 84100 ASSAY OF PHOSPHORUS: CPT

## 2017-08-02 PROCEDURE — 80048 BASIC METABOLIC PNL TOTAL CA: CPT

## 2017-08-02 PROCEDURE — 83970 ASSAY OF PARATHORMONE: CPT

## 2017-08-02 PROCEDURE — 36415 COLL VENOUS BLD VENIPUNCTURE: CPT

## 2017-08-02 PROCEDURE — 85025 COMPLETE CBC W/AUTO DIFF WBC: CPT

## 2017-08-02 PROCEDURE — 82040 ASSAY OF SERUM ALBUMIN: CPT

## 2017-08-10 ENCOUNTER — HOSPITAL ENCOUNTER (OUTPATIENT)
Dept: RADIOLOGY | Facility: HOSPITAL | Age: 67
Discharge: HOME | End: 2017-08-10
Attending: INTERNAL MEDICINE
Payer: MEDICARE

## 2017-08-10 DIAGNOSIS — I10 HYPERTENSION: ICD-10-CM

## 2017-08-10 DIAGNOSIS — N18.4 CHRONIC KIDNEY DISEASE, STAGE IV (SEVERE): ICD-10-CM

## 2017-08-10 PROCEDURE — 76770 US EXAM ABDO BACK WALL COMP: CPT | Mod: 26,,, | Performed by: RADIOLOGY

## 2017-08-10 PROCEDURE — 76770 US EXAM ABDO BACK WALL COMP: CPT | Mod: TC

## 2017-08-30 ENCOUNTER — TELEPHONE (OUTPATIENT)
Dept: ELECTROPHYSIOLOGY | Facility: CLINIC | Age: 67
End: 2017-08-30

## 2017-08-30 NOTE — TELEPHONE ENCOUNTER
Called pt to send ILR transmission, pt stated he is busy and cannot talk. Asked pt to please send transmission when he has a chance, he stated ok.

## 2017-10-05 ENCOUNTER — TELEPHONE (OUTPATIENT)
Dept: ELECTROPHYSIOLOGY | Facility: CLINIC | Age: 67
End: 2017-10-05

## 2017-10-05 NOTE — TELEPHONE ENCOUNTER
Left message for patient in regards to home monitor transmission. I asked him to submit a new transmission. I told him to call with any questions.

## 2017-11-08 ENCOUNTER — TELEPHONE (OUTPATIENT)
Dept: ELECTROPHYSIOLOGY | Facility: CLINIC | Age: 67
End: 2017-11-08

## 2017-11-08 NOTE — TELEPHONE ENCOUNTER
Called patient in relation to [employer] LOOP recorder not being connected to the home monitor, patient stated he will do later when he is home.

## 2017-11-14 ENCOUNTER — TELEPHONE (OUTPATIENT)
Dept: ELECTROPHYSIOLOGY | Facility: CLINIC | Age: 67
End: 2017-11-14

## 2017-11-14 NOTE — TELEPHONE ENCOUNTER
Called patient back to assist with ILR, patient stated the monitor would not turn on, advised patient to call [employer] technical support. Pt hung up the phone.

## 2017-11-14 NOTE — TELEPHONE ENCOUNTER
Called patient in relation to [employer] LOOP recorder not being connected to the home monitor, patient stated he was not home and to please call him back around 3 PM to assist him with sending a manual transmission.

## 2018-06-22 ENCOUNTER — TELEPHONE (OUTPATIENT)
Dept: ELECTROPHYSIOLOGY | Facility: CLINIC | Age: 68
End: 2018-06-22

## 2018-06-22 DIAGNOSIS — I63.9 CRYPTOGENIC STROKE: ICD-10-CM

## 2018-06-22 DIAGNOSIS — Z95.818 STATUS POST PLACEMENT OF IMPLANTABLE LOOP RECORDER: Primary | ICD-10-CM

## 2018-06-22 NOTE — TELEPHONE ENCOUNTER
Mr Albrecht' home # is not a working #. Called Margie, his daughter. Will go to his house later and see if he is home. I thanked her. We need a manual transmission from his loop home monitor.

## 2018-06-25 ENCOUNTER — TELEPHONE (OUTPATIENT)
Dept: ELECTROPHYSIOLOGY | Facility: CLINIC | Age: 68
End: 2018-06-25

## 2018-06-25 NOTE — TELEPHONE ENCOUNTER
Ms Hanson called back. She is estranged from her father. But she offered to go to his home to get a new # so we could reach him re: his loop recorder. She tried 3 times over the weekend. Mr Chin would not answer the door. On the 3rd try, his roommate answered the door, stated Mr Chin was sleeping and not to be disturbed. She asked if her father had a new #. He said yes, but he (the roommate) did not know it.I thanked her for trying.

## 2018-07-05 ENCOUNTER — HOSPITAL ENCOUNTER (INPATIENT)
Facility: HOSPITAL | Age: 68
LOS: 4 days | Discharge: HOME | DRG: 291 | End: 2018-07-09
Attending: EMERGENCY MEDICINE | Admitting: FAMILY MEDICINE
Payer: MEDICARE

## 2018-07-05 DIAGNOSIS — E11.00 UNCONTROLLED TYPE 2 DIABETES MELLITUS WITH HYPEROSMOLARITY WITHOUT COMA, WITH LONG-TERM CURRENT USE OF INSULIN: ICD-10-CM

## 2018-07-05 DIAGNOSIS — N18.9 ACUTE RENAL FAILURE SUPERIMPOSED ON CHRONIC KIDNEY DISEASE, UNSPECIFIED CKD STAGE, UNSPECIFIED ACUTE RENAL FAILURE TYPE: ICD-10-CM

## 2018-07-05 DIAGNOSIS — I50.9 CONGESTIVE HEART FAILURE, UNSPECIFIED HF CHRONICITY, UNSPECIFIED HEART FAILURE TYPE: ICD-10-CM

## 2018-07-05 DIAGNOSIS — J81.0 ACUTE PULMONARY EDEMA: Primary | ICD-10-CM

## 2018-07-05 DIAGNOSIS — E11.59 HYPERTENSION ASSOCIATED WITH DIABETES: ICD-10-CM

## 2018-07-05 DIAGNOSIS — I63.9 LEFT-SIDED CEREBROVASCULAR ACCIDENT (CVA): ICD-10-CM

## 2018-07-05 DIAGNOSIS — N17.9 ACUTE RENAL FAILURE SUPERIMPOSED ON STAGE 5 CHRONIC KIDNEY DISEASE, NOT ON CHRONIC DIALYSIS, UNSPECIFIED ACUTE RENAL FAILURE TYPE: ICD-10-CM

## 2018-07-05 DIAGNOSIS — I10 ESSENTIAL HYPERTENSION: ICD-10-CM

## 2018-07-05 DIAGNOSIS — E78.5 HYPERLIPIDEMIA, UNSPECIFIED HYPERLIPIDEMIA TYPE: Chronic | ICD-10-CM

## 2018-07-05 DIAGNOSIS — R73.9 HYPERGLYCEMIA: ICD-10-CM

## 2018-07-05 DIAGNOSIS — R06.02 SHORTNESS OF BREATH: ICD-10-CM

## 2018-07-05 DIAGNOSIS — E11.65 TYPE 2 DIABETES MELLITUS WITH HYPERGLYCEMIA, WITH LONG-TERM CURRENT USE OF INSULIN: ICD-10-CM

## 2018-07-05 DIAGNOSIS — N17.9 AKI (ACUTE KIDNEY INJURY): ICD-10-CM

## 2018-07-05 DIAGNOSIS — I50.23 ACUTE ON CHRONIC SYSTOLIC HEART FAILURE: ICD-10-CM

## 2018-07-05 DIAGNOSIS — E78.2 COMBINED HYPERLIPIDEMIA ASSOCIATED WITH TYPE 2 DIABETES MELLITUS: ICD-10-CM

## 2018-07-05 DIAGNOSIS — N18.5 ACUTE RENAL FAILURE SUPERIMPOSED ON STAGE 5 CHRONIC KIDNEY DISEASE, NOT ON CHRONIC DIALYSIS, UNSPECIFIED ACUTE RENAL FAILURE TYPE: ICD-10-CM

## 2018-07-05 DIAGNOSIS — E11.22 CKD STAGE 3 DUE TO TYPE 2 DIABETES MELLITUS: ICD-10-CM

## 2018-07-05 DIAGNOSIS — Z79.4 TYPE 2 DIABETES MELLITUS WITH HYPERGLYCEMIA, WITH LONG-TERM CURRENT USE OF INSULIN: ICD-10-CM

## 2018-07-05 DIAGNOSIS — N18.30 CKD STAGE 3 DUE TO TYPE 2 DIABETES MELLITUS: ICD-10-CM

## 2018-07-05 DIAGNOSIS — E87.70 HYPERVOLEMIA, UNSPECIFIED HYPERVOLEMIA TYPE: ICD-10-CM

## 2018-07-05 DIAGNOSIS — N17.9 ACUTE RENAL FAILURE SUPERIMPOSED ON CHRONIC KIDNEY DISEASE, UNSPECIFIED CKD STAGE, UNSPECIFIED ACUTE RENAL FAILURE TYPE: ICD-10-CM

## 2018-07-05 DIAGNOSIS — I15.2 HYPERTENSION ASSOCIATED WITH DIABETES: ICD-10-CM

## 2018-07-05 DIAGNOSIS — I50.9 CHF (CONGESTIVE HEART FAILURE): ICD-10-CM

## 2018-07-05 DIAGNOSIS — Z79.4 UNCONTROLLED TYPE 2 DIABETES MELLITUS WITH HYPEROSMOLARITY WITHOUT COMA, WITH LONG-TERM CURRENT USE OF INSULIN: ICD-10-CM

## 2018-07-05 DIAGNOSIS — E11.69 COMBINED HYPERLIPIDEMIA ASSOCIATED WITH TYPE 2 DIABETES MELLITUS: ICD-10-CM

## 2018-07-05 LAB
ALBUMIN SERPL BCP-MCNC: 3.4 G/DL
ALP SERPL-CCNC: 145 U/L
ALT SERPL W/O P-5'-P-CCNC: 13 U/L
ANION GAP SERPL CALC-SCNC: 10 MMOL/L
AST SERPL-CCNC: 10 U/L
BACTERIA #/AREA URNS HPF: NORMAL /HPF
BASOPHILS # BLD AUTO: 0.03 K/UL
BASOPHILS NFR BLD: 0.4 %
BILIRUB SERPL-MCNC: 1.1 MG/DL
BILIRUB UR QL STRIP: NEGATIVE
BNP SERPL-MCNC: 1917 PG/ML
BUN SERPL-MCNC: 65 MG/DL
CALCIUM SERPL-MCNC: 9.2 MG/DL
CHLORIDE SERPL-SCNC: 99 MMOL/L
CLARITY UR: CLEAR
CO2 SERPL-SCNC: 24 MMOL/L
COLOR UR: YELLOW
CREAT SERPL-MCNC: 4.1 MG/DL
DIFFERENTIAL METHOD: ABNORMAL
EOSINOPHIL # BLD AUTO: 0.1 K/UL
EOSINOPHIL NFR BLD: 1.8 %
ERYTHROCYTE [DISTWIDTH] IN BLOOD BY AUTOMATED COUNT: 15.2 %
EST. GFR  (AFRICAN AMERICAN): 17 ML/MIN/1.73 M^2
EST. GFR  (NON AFRICAN AMERICAN): 14 ML/MIN/1.73 M^2
ESTIMATED AVG GLUCOSE: 192 MG/DL
GLUCOSE SERPL-MCNC: 514 MG/DL
GLUCOSE UR QL STRIP: ABNORMAL
HBA1C MFR BLD HPLC: 8.3 %
HCT VFR BLD AUTO: 27 %
HGB BLD-MCNC: 8.6 G/DL
HGB UR QL STRIP: NEGATIVE
HYALINE CASTS #/AREA URNS LPF: 0 /LPF
INR PPP: 1.1
KETONES UR QL STRIP: NEGATIVE
LEUKOCYTE ESTERASE UR QL STRIP: NEGATIVE
LYMPHOCYTES # BLD AUTO: 0.2 K/UL
LYMPHOCYTES NFR BLD: 2.8 %
MCH RBC QN AUTO: 30 PG
MCHC RBC AUTO-ENTMCNC: 31.9 G/DL
MCV RBC AUTO: 94 FL
MICROSCOPIC COMMENT: NORMAL
MONOCYTES # BLD AUTO: 0.5 K/UL
MONOCYTES NFR BLD: 6.3 %
NEUTROPHILS # BLD AUTO: 7 K/UL
NEUTROPHILS NFR BLD: 88.7 %
NITRITE UR QL STRIP: NEGATIVE
PH UR STRIP: 6 [PH] (ref 5–8)
PLATELET # BLD AUTO: 255 K/UL
PMV BLD AUTO: 9.4 FL
POCT GLUCOSE: 265 MG/DL (ref 70–110)
POCT GLUCOSE: 276 MG/DL (ref 70–110)
POCT GLUCOSE: 314 MG/DL (ref 70–110)
POCT GLUCOSE: 352 MG/DL (ref 70–110)
POCT GLUCOSE: 452 MG/DL (ref 70–110)
POTASSIUM SERPL-SCNC: 5.1 MMOL/L
PROT SERPL-MCNC: 7.6 G/DL
PROT UR QL STRIP: ABNORMAL
PROTHROMBIN TIME: 11.8 SEC
RBC # BLD AUTO: 2.87 M/UL
RBC #/AREA URNS HPF: 1 /HPF (ref 0–4)
SODIUM SERPL-SCNC: 133 MMOL/L
SP GR UR STRIP: 1.02 (ref 1–1.03)
TROPONIN I SERPL DL<=0.01 NG/ML-MCNC: <0.006 NG/ML
URN SPEC COLLECT METH UR: ABNORMAL
UROBILINOGEN UR STRIP-ACNC: NEGATIVE EU/DL
WBC # BLD AUTO: 7.93 K/UL
WBC #/AREA URNS HPF: 2 /HPF (ref 0–5)

## 2018-07-05 PROCEDURE — 96374 THER/PROPH/DIAG INJ IV PUSH: CPT

## 2018-07-05 PROCEDURE — 93306 TTE W/DOPPLER COMPLETE: CPT

## 2018-07-05 PROCEDURE — 25000003 PHARM REV CODE 250: Performed by: STUDENT IN AN ORGANIZED HEALTH CARE EDUCATION/TRAINING PROGRAM

## 2018-07-05 PROCEDURE — 25000003 PHARM REV CODE 250: Performed by: EMERGENCY MEDICINE

## 2018-07-05 PROCEDURE — 63600175 PHARM REV CODE 636 W HCPCS: Performed by: STUDENT IN AN ORGANIZED HEALTH CARE EDUCATION/TRAINING PROGRAM

## 2018-07-05 PROCEDURE — 99285 EMERGENCY DEPT VISIT HI MDM: CPT | Mod: 25

## 2018-07-05 PROCEDURE — 94761 N-INVAS EAR/PLS OXIMETRY MLT: CPT

## 2018-07-05 PROCEDURE — 11000001 HC ACUTE MED/SURG PRIVATE ROOM

## 2018-07-05 PROCEDURE — 83880 ASSAY OF NATRIURETIC PEPTIDE: CPT

## 2018-07-05 PROCEDURE — 82962 GLUCOSE BLOOD TEST: CPT

## 2018-07-05 PROCEDURE — 96375 TX/PRO/DX INJ NEW DRUG ADDON: CPT

## 2018-07-05 PROCEDURE — 81000 URINALYSIS NONAUTO W/SCOPE: CPT

## 2018-07-05 PROCEDURE — 93005 ELECTROCARDIOGRAM TRACING: CPT

## 2018-07-05 PROCEDURE — 85610 PROTHROMBIN TIME: CPT

## 2018-07-05 PROCEDURE — 80053 COMPREHEN METABOLIC PANEL: CPT

## 2018-07-05 PROCEDURE — 85025 COMPLETE CBC W/AUTO DIFF WBC: CPT

## 2018-07-05 PROCEDURE — 96376 TX/PRO/DX INJ SAME DRUG ADON: CPT

## 2018-07-05 PROCEDURE — 84484 ASSAY OF TROPONIN QUANT: CPT

## 2018-07-05 PROCEDURE — 27000221 HC OXYGEN, UP TO 24 HOURS

## 2018-07-05 PROCEDURE — 63600175 PHARM REV CODE 636 W HCPCS: Performed by: EMERGENCY MEDICINE

## 2018-07-05 PROCEDURE — 93010 ELECTROCARDIOGRAM REPORT: CPT | Mod: ,,, | Performed by: INTERNAL MEDICINE

## 2018-07-05 PROCEDURE — 83036 HEMOGLOBIN GLYCOSYLATED A1C: CPT

## 2018-07-05 RX ORDER — ONDANSETRON 2 MG/ML
4 INJECTION INTRAMUSCULAR; INTRAVENOUS EVERY 6 HOURS PRN
Status: DISCONTINUED | OUTPATIENT
Start: 2018-07-05 | End: 2018-07-09 | Stop reason: HOSPADM

## 2018-07-05 RX ORDER — CITALOPRAM 20 MG/1
20 TABLET, FILM COATED ORAL DAILY
Status: DISCONTINUED | OUTPATIENT
Start: 2018-07-05 | End: 2018-07-09 | Stop reason: HOSPADM

## 2018-07-05 RX ORDER — INSULIN ASPART 100 [IU]/ML
1-10 INJECTION, SOLUTION INTRAVENOUS; SUBCUTANEOUS
Status: DISCONTINUED | OUTPATIENT
Start: 2018-07-05 | End: 2018-07-09 | Stop reason: HOSPADM

## 2018-07-05 RX ORDER — ROSUVASTATIN CALCIUM 10 MG/1
40 TABLET, COATED ORAL DAILY
Status: DISCONTINUED | OUTPATIENT
Start: 2018-07-06 | End: 2018-07-09 | Stop reason: HOSPADM

## 2018-07-05 RX ORDER — IBUPROFEN 200 MG
16 TABLET ORAL
Status: DISCONTINUED | OUTPATIENT
Start: 2018-07-05 | End: 2018-07-09 | Stop reason: HOSPADM

## 2018-07-05 RX ORDER — IBUPROFEN 200 MG
24 TABLET ORAL
Status: DISCONTINUED | OUTPATIENT
Start: 2018-07-05 | End: 2018-07-09 | Stop reason: HOSPADM

## 2018-07-05 RX ORDER — SODIUM CHLORIDE 0.9 % (FLUSH) 0.9 %
5 SYRINGE (ML) INJECTION
Status: DISCONTINUED | OUTPATIENT
Start: 2018-07-05 | End: 2018-07-09 | Stop reason: HOSPADM

## 2018-07-05 RX ORDER — GLUCAGON 1 MG
1 KIT INJECTION
Status: DISCONTINUED | OUTPATIENT
Start: 2018-07-05 | End: 2018-07-09 | Stop reason: HOSPADM

## 2018-07-05 RX ORDER — HYDRALAZINE HYDROCHLORIDE 25 MG/1
50 TABLET, FILM COATED ORAL 3 TIMES DAILY
Status: DISCONTINUED | OUTPATIENT
Start: 2018-07-05 | End: 2018-07-09 | Stop reason: HOSPADM

## 2018-07-05 RX ORDER — FUROSEMIDE 10 MG/ML
80 INJECTION INTRAMUSCULAR; INTRAVENOUS
Status: COMPLETED | OUTPATIENT
Start: 2018-07-05 | End: 2018-07-05

## 2018-07-05 RX ORDER — FERROUS SULFATE 325(65) MG
325 TABLET, DELAYED RELEASE (ENTERIC COATED) ORAL DAILY
Status: DISCONTINUED | OUTPATIENT
Start: 2018-07-05 | End: 2018-07-09 | Stop reason: HOSPADM

## 2018-07-05 RX ORDER — RAMELTEON 8 MG/1
8 TABLET ORAL NIGHTLY
Status: DISCONTINUED | OUTPATIENT
Start: 2018-07-05 | End: 2018-07-09 | Stop reason: HOSPADM

## 2018-07-05 RX ORDER — FUROSEMIDE 10 MG/ML
80 INJECTION INTRAMUSCULAR; INTRAVENOUS 2 TIMES DAILY
Status: DISCONTINUED | OUTPATIENT
Start: 2018-07-05 | End: 2018-07-07

## 2018-07-05 RX ORDER — TRAZODONE HYDROCHLORIDE 50 MG/1
50 TABLET ORAL NIGHTLY
Status: DISCONTINUED | OUTPATIENT
Start: 2018-07-05 | End: 2018-07-09 | Stop reason: HOSPADM

## 2018-07-05 RX ORDER — LISINOPRIL 10 MG/1
40 TABLET ORAL DAILY
Status: CANCELLED | OUTPATIENT
Start: 2018-07-05

## 2018-07-05 RX ORDER — ASPIRIN 81 MG/1
81 TABLET ORAL DAILY
Status: DISCONTINUED | OUTPATIENT
Start: 2018-07-05 | End: 2018-07-09 | Stop reason: HOSPADM

## 2018-07-05 RX ADMIN — INSULIN HUMAN 5 UNITS: 100 INJECTION, SOLUTION PARENTERAL at 12:07

## 2018-07-05 RX ADMIN — INSULIN ASPART 4 UNITS: 100 INJECTION, SOLUTION INTRAVENOUS; SUBCUTANEOUS at 09:07

## 2018-07-05 RX ADMIN — FUROSEMIDE 80 MG: 10 INJECTION, SOLUTION INTRAMUSCULAR; INTRAVENOUS at 05:07

## 2018-07-05 RX ADMIN — NITROGLYCERIN 1 INCH: 20 OINTMENT TOPICAL at 09:07

## 2018-07-05 RX ADMIN — INSULIN ASPART 6 UNITS: 100 INJECTION, SOLUTION INTRAVENOUS; SUBCUTANEOUS at 05:07

## 2018-07-05 RX ADMIN — ASPIRIN 81 MG: 81 TABLET, COATED ORAL at 03:07

## 2018-07-05 RX ADMIN — RAMELTEON 8 MG: 8 TABLET, FILM COATED ORAL at 09:07

## 2018-07-05 RX ADMIN — HYDRALAZINE HYDROCHLORIDE 50 MG: 25 TABLET, FILM COATED ORAL at 03:07

## 2018-07-05 RX ADMIN — FERROUS SULFATE TAB EC 325 MG (65 MG FE EQUIVALENT) 325 MG: 325 (65 FE) TABLET DELAYED RESPONSE at 03:07

## 2018-07-05 RX ADMIN — CITALOPRAM HYDROBROMIDE 20 MG: 20 TABLET ORAL at 03:07

## 2018-07-05 RX ADMIN — INSULIN DETEMIR 5 UNITS: 100 INJECTION, SOLUTION SUBCUTANEOUS at 09:07

## 2018-07-05 RX ADMIN — INSULIN HUMAN 5 UNITS: 100 INJECTION, SOLUTION PARENTERAL at 10:07

## 2018-07-05 RX ADMIN — HYDRALAZINE HYDROCHLORIDE 50 MG: 25 TABLET, FILM COATED ORAL at 09:07

## 2018-07-05 RX ADMIN — TRAZODONE HYDROCHLORIDE 50 MG: 50 TABLET ORAL at 09:07

## 2018-07-05 RX ADMIN — FUROSEMIDE 80 MG: 10 INJECTION, SOLUTION INTRAMUSCULAR; INTRAVENOUS at 10:07

## 2018-07-05 NOTE — ED PROVIDER NOTES
Encounter Date: 7/5/2018    SCRIBE #1 NOTE: I, Kadie Jose, am scribing for, and in the presence of, Dr. Hicks.       History     Chief Complaint   Patient presents with    Shortness of Breath     SOB x4-5 days and worsenign lower ext edema    Shortness of Breath     67-year-old male presents emergency department complaining of shortness of breath.  Reports onset 5 days ago, gradually worsening over the last 5 days.  Reports it is mostly exertional, although over the last 48 hr he has had shortness of breath at rest.  He also reports that is worse with lying supine.  He does not report any chest pain.  He does report feeling somewhat lightheaded when he stands but denies any other symptoms at this time.  Reports a little bit of increased swelling to bilateral lower extremities. Denies any fever, nausea, vomiting. Does reports some nasal congestion and postnasal drip on review of systems.      The history is provided by the patient.     Review of patient's allergies indicates:  No Known Allergies  Past Medical History:   Diagnosis Date    CHF (congestive heart failure)     Diabetes mellitus     Hypertension     PVD (peripheral vascular disease)     Stroke     Lt pontine stroke 11/15/2016     Past Surgical History:   Procedure Laterality Date    TOE AMPUTATION Left     5th toe     Family History   Problem Relation Age of Onset    Stroke Mother     Diabetes Father      Social History   Substance Use Topics    Smoking status: Never Smoker    Smokeless tobacco: Not on file    Alcohol use No     Review of Systems   Constitutional: Positive for fatigue. Negative for chills and fever.   HENT: Positive for congestion and postnasal drip. Negative for trouble swallowing.    Eyes: Negative for photophobia, pain and redness.   Respiratory: Positive for shortness of breath. Negative for cough and choking.    Cardiovascular: Positive for leg swelling. Negative for chest pain and palpitations.   Gastrointestinal:  Negative for abdominal pain, diarrhea, nausea and vomiting.   Genitourinary: Negative for dysuria, frequency and urgency.   Musculoskeletal: Negative for back pain, neck pain and neck stiffness.   Neurological: Positive for light-headedness. Negative for seizures, speech difficulty, numbness and headaches.   Hematological: Does not bruise/bleed easily.   All other systems reviewed and are negative.      Physical Exam     Initial Vitals   BP Pulse Resp Temp SpO2   07/05/18 0936 07/05/18 0936 07/05/18 0938 07/05/18 0938 07/05/18 0936   132/71 78 (!) 28 98.2 °F (36.8 °C) (!) 85 %      MAP       --                Physical Exam    Nursing note and vitals reviewed.  Constitutional: He appears well-developed and well-nourished. No distress.   HENT:   Head: Normocephalic and atraumatic.   Mouth/Throat: Mucous membranes are dry.   Oropharynx clear   Eyes: Conjunctivae and EOM are normal. Pupils are equal, round, and reactive to light.   Neck: Normal range of motion. Neck supple. No tracheal deviation present.   Cardiovascular: Normal rate, regular rhythm, normal heart sounds and intact distal pulses.   Pulmonary/Chest: No respiratory distress. He has no wheezes. He has no rhonchi. He has rales (bibasilar).   Abdominal: Soft. Bowel sounds are normal. He exhibits no distension. There is no tenderness.   Musculoskeletal: Normal range of motion. He exhibits edema (BLE). He exhibits no tenderness.   Neurological: He is alert and oriented to person, place, and time. He has normal strength. No cranial nerve deficit or sensory deficit.   Skin: Skin is warm and dry. Capillary refill takes less than 2 seconds.         ED Course   Critical Care  Date/Time: 7/5/2018 2:02 PM  Performed by: NAKUL OLIVEIRA.  Authorized by: NAKUL OLIVEIRA   Direct patient critical care time: 15 minutes  Additional history critical care time: 15 minutes  Ordering / reviewing critical care time: 15 minutes  Documentation critical care time: 15  minutes  Consulting other physicians critical care time: 15 minutes  Consult with family critical care time: 10 minutes  Total critical care time (exclusive of procedural time) : 85 minutes  Critical care time was exclusive of separately billable procedures and treating other patients and teaching time.  Critical care was necessary to treat or prevent imminent or life-threatening deterioration of the following conditions: circulatory failure, respiratory failure, cardiac failure and endocrine crisis.  Critical care was time spent personally by me on the following activities: obtaining history from patient or surrogate, examination of patient, evaluation of patient's response to treatment, ordering and performing treatments and interventions, ordering and review of laboratory studies, ordering and review of radiographic studies, pulse oximetry, re-evaluation of patient's condition, review of old charts and interpretation of cardiac output measurements.        Labs Reviewed   CBC W/ AUTO DIFFERENTIAL - Abnormal; Notable for the following:        Result Value    RBC 2.87 (*)     Hemoglobin 8.6 (*)     Hematocrit 27.0 (*)     MCHC 31.9 (*)     RDW 15.2 (*)     Lymph # 0.2 (*)     Gran% 88.7 (*)     Lymph% 2.8 (*)     All other components within normal limits   COMPREHENSIVE METABOLIC PANEL - Abnormal; Notable for the following:     Sodium 133 (*)     Glucose 514 (*)     BUN, Bld 65 (*)     Creatinine 4.1 (*)     Albumin 3.4 (*)     Total Bilirubin 1.1 (*)     Alkaline Phosphatase 145 (*)     eGFR if  17 (*)     eGFR if non  14 (*)     All other components within normal limits    Narrative:     glu   critical result(s) called and verbal readback obtained from  m.   moellar rn, 07/05/2018 10:17   B-TYPE NATRIURETIC PEPTIDE - Abnormal; Notable for the following:     BNP 1,917 (*)     All other components within normal limits   POCT GLUCOSE - Abnormal; Notable for the following:     POCT  Glucose 452 (*)     All other components within normal limits   PROTIME-INR   TROPONIN I   URINALYSIS   POCT GLUCOSE MONITORING CONTINUOUS     EKG Readings: (Independently Interpreted)   Initial Reading: No STEMI. Previous EKG: Compared with most recent EKG Previous EKG Date: 1/4/17 (nonspecific change). Heart Rate: 80. Ectopy: No Ectopy. Conduction: Normal. ST Segments: Normal ST Segments. T Waves Flipped: I, III and AVL. Axis: Normal.         X-Rays:   Independently Interpreted Readings:   Other Readings:  Reviewed by myself, read by radiology.     Imaging Results          X-Ray Chest AP Portable (Final result)  Result time 07/05/18 09:51:32    Final result by Nav Tiwari DO (07/05/18 09:51:32)                 Impression:      Please see above      Electronically signed by: Nav Tiwari DO  Date:    07/05/2018  Time:    09:51             Narrative:    EXAMINATION:  XR CHEST AP PORTABLE    CLINICAL HISTORY:  Shortness of breath;    TECHNIQUE:  Single frontal view of the chest was performed.    COMPARISON:  01/05/2017    FINDINGS:  Ill-defined perihilar and bibasilar lung opacities concerning for vascular congestion and edema similar to prior.  There is poor definition of the lung bases concerning for superimposed effusions greater on the left.  There is no large pneumothorax.  Clinical correlation and continued follow-up advised                                Medical Decision Making:   History:   Old Medical Records: I decided to obtain old medical records.  Initial Assessment:   67 y.o. male presents with SOB and worsening BLE edema  Differential Diagnosis:   Pulmonary infectious process, COPD, asthma, pulmonary embolus and congestive heart failure.   Independently Interpreted Test(s):   I have ordered and independently interpreted X-rays - see prior notes.  I have ordered and independently interpreted EKG Reading(s) - see prior notes  Clinical Tests:   Lab Tests: Reviewed       <> Summary of Lab: Elevated  BNP and CBG  ED Management:  Patient given nitropaste, 80 Lasix, 10 total units of insulin.  His CBG is responded appropriately but is not diuresed very much and he has low oxygen saturation on room air despite the previously mentioned management.  He prefers admission and I agree given his hypoxemia.  I [facility] the case with [facility], who will see and admit the patient.  Other:   I have discussed this case with another health care provider.                      Clinical Impression:     1. Acute pulmonary edema    2. Shortness of breath    3. Hypervolemia, unspecified hypervolemia type    4. Hyperglycemia           Disposition:   Disposition: Admitted  Condition: Fair    I, Dr. Guillaume Hicks, personally performed the services described in this documentation. All medical record entries made by the scribe were at my direction and in my presence.  I have reviewed the chart and agree that the record reflects my personal performance and is accurate and complete. Guillaume Hicks MD.  2:28 PM 07/05/2018                       Guillaume Hicks MD  07/05/18 1887

## 2018-07-05 NOTE — H&P
"History & Physical  Family Medicine    Subjective:    Chief Complaint   Patient presents with    Shortness of Breath     SOB x4-5 days and worsenign lower ext edema    Shortness of Breath       History of Present Illness:   66yo Malian speaking M w/ PMHx significant for HFrEF (EF 65% 1/2017), L pontine infarct on 11/15/16, HTN, CAD s/p MI, DM2, HLD, CKD4, diabetic retinopathy who presents to ED with complaint of SOB and generalized fatigue x1 week. Patient states that he is followed by [facility] and that he had an appointment last week at which time "tests were run on me and they told me that my heart was working well." Says his shortness of breath is worse with exertion. At baseline, he can ambulate across his house with out SOB and fatigue, however, currently can only take a few steps before becoming SOB. States he sleeps with 2 pillows however admits to have to sleep sitting up and feels short of breath when sleepign laying down. Endorses swollen legs and abdomen over the last couple of days but does not know specifically how much weight he has gained. He denies any chest pains or cough,  fever, chills, chest pain, cough, abdominal pain, dizziness, lightheadedness. Patient most recently admitted to [facility] service in 1/2017 with similar presentation.       Past Medical History:   Diagnosis Date    CHF (congestive heart failure)     Diabetes mellitus     Hypertension     PVD (peripheral vascular disease)     Stroke     Lt pontine stroke 11/15/2016       Past Surgical History:   Procedure Laterality Date    TOE AMPUTATION Left     5th toe       Family History   Problem Relation Age of Onset    Stroke Mother     Diabetes Father        Social History     Social History    Marital status: Single     Spouse name: N/A    Number of children: N/A    Years of education: N/A     Social History Main Topics    Smoking status: Never Smoker    Smokeless tobacco: None    Alcohol use " No    Drug use: No    Sexual activity: Not Asked     Other Topics Concern    None     Social History Narrative    None       Current Facility-Administered Medications   Medication Dose Route Frequency Provider Last Rate Last Dose    furosemide injection 80 mg  80 mg Intravenous BID John Wadsworth MD        insulin detemir U-100 pen 10 Units  10 Units Subcutaneous BID John Wadsworth MD         Current Outpatient Prescriptions   Medication Sig Dispense Refill    blood sugar diagnostic Strp 100 strips by Misc.(Non-Drug; Combo Route) route once daily. 100 strip 11    ergocalciferol (ERGOCALCIFEROL) 50,000 unit Cap Take 1 capsule (50,000 Units total) by mouth every 7 days. 8 capsule 0    ferrous sulfate 325 (65 FE) MG EC tablet Take 1 tablet (325 mg total) by mouth once daily.  0    nut.tx.impaired renal fxn,soy 0.04-1.79 gram-kcal/mL Liqd 1 Can with Meals Three Times a Day 30 Can 3    rosuvastatin (CRESTOR) 20 MG tablet Take 2 tablets (40 mg total) by mouth once daily. 30 tablet 3    vitamin renal formula, B-complex-vitamin c-folic acid, (NEPHROCAP) 1 mg Cap Take 1 capsule by mouth once daily. 90 capsule 3    amlodipine (NORVASC) 10 MG tablet Take 1 tablet (10 mg total) by mouth every evening. 90 tablet 3    aspirin (ECOTRIN) 81 MG EC tablet Take 1 tablet (81 mg total) by mouth once daily.  0    carvedilol (COREG) 12.5 MG tablet Take 1 tablet (12.5 mg total) by mouth 2 (two) times daily. 180 tablet 3    citalopram (CELEXA) 20 MG tablet Take 1 tablet (20 mg total) by mouth once daily. 30 tablet 3    furosemide (LASIX) 80 MG tablet Take 1 tablet (80 mg total) by mouth every 8 (eight) hours. 90 tablet 11    hydrALAZINE (APRESOLINE) 50 MG tablet Take 1 tablet (50 mg total) by mouth 3 (three) times daily. 90 tablet 3    insulin glargine (LANTUS) 100 unit/mL injection Inject 12 Units into the skin every evening. 10.8 mL 0    lisinopril (PRINIVIL,ZESTRIL) 40 MG tablet Take 1 tablet (40 mg  total) by mouth once daily. 90 tablet 0    metOLazone (ZAROXOLYN) 10 MG tablet Take 1 tablet (10 mg total) by mouth once daily. 30 tablet 11    trazodone (DESYREL) 50 MG tablet Take 1 tablet (50 mg total) by mouth every evening. 30 tablet 3       Review of patient's allergies indicates:  No Known Allergies    Review of Systems   Constitutional: Negative for chills, fever and weight loss.   HENT: Negative for congestion.    Eyes: Negative for blurred vision and double vision.   Respiratory: Positive for shortness of breath. Negative for cough.    Cardiovascular: Positive for orthopnea, leg swelling and PND. Negative for chest pain.   Gastrointestinal: Positive for nausea. Negative for blood in stool, constipation and diarrhea.   Genitourinary: Negative for frequency and hematuria.   Musculoskeletal: Negative for myalgias.   Neurological: Positive for weakness. Negative for dizziness and headaches.   Psychiatric/Behavioral: Negative for depression and suicidal ideas.       Objective    Vital Signs (Most Recent):  Temp:  [98.2 °F (36.8 °C)]   Pulse:  [60-78]   Resp:  [28]   BP: (130-138)/(65-77)   SpO2:  [85 %-98 %]     Physical Exam   Constitutional: He is oriented to person, place, and time. He appears well-developed and well-nourished.   HENT:   Head: Normocephalic and atraumatic.   Eyes: EOM are normal.   Neck: Normal range of motion. Neck supple. JVD present.   Cardiovascular: Normal rate and regular rhythm.    Pulmonary/Chest: He has decreased breath sounds. He has rales.   Abdominal: Soft. Bowel sounds are normal. He exhibits distension. There is no tenderness.   Musculoskeletal: He exhibits no edema or tenderness.   3+ bilateral pitting edema   Neurological: He is alert and oriented to person, place, and time.   Skin: Skin is warm.   Psychiatric: He has a normal mood and affect. His behavior is normal.       Laboratory:    Most Recent Data:  CBC: Lab Results   Component Value Date    WBC 7.93 07/05/2018     HGB 8.6 (L) 07/05/2018    HCT 27.0 (L) 07/05/2018     07/05/2018    MCV 94 07/05/2018    RDW 15.2 (H) 07/05/2018     BMP: Lab Results   Component Value Date     (L) 07/05/2018    K 5.1 07/05/2018    CL 99 07/05/2018    CO2 24 07/05/2018    BUN 65 (H) 07/05/2018     (HH) 07/05/2018    CALCIUM 9.2 07/05/2018    MG 2.6 01/11/2017    PHOS 6.3 (H) 08/02/2017     LFTs: Lab Results   Component Value Date    PROT 7.6 07/05/2018    ALBUMIN 3.4 (L) 07/05/2018    BILITOT 1.1 (H) 07/05/2018    AST 10 07/05/2018    ALKPHOS 145 (H) 07/05/2018    ALT 13 07/05/2018     Coags:   Lab Results   Component Value Date    INR 1.1 07/05/2018     FLP: Lab Results   Component Value Date    CHOL 183 11/15/2016    HDL 27 (L) 11/15/2016    LDLCALC 126.6 11/15/2016    TRIG 147 11/15/2016    CHOLHDL 14.8 (L) 11/15/2016     DM: Lab Results   Component Value Date    HGBA1C 9.5 (H) 03/23/2017    HGBA1C 10.5 (H) 11/15/2016    LDLCALC 126.6 11/15/2016    CREATININE 4.1 (H) 07/05/2018     HgbA1c:   Lab Results   Component Value Date    HGBA1C 9.5 (H) 03/23/2017     Thyroid: Lab Results   Component Value Date    TSH 1.872 11/15/2016     Anemia: Lab Results   Component Value Date    IRON 39 (L) 01/03/2017    TIBC 275 01/03/2017    FERRITIN 231 01/08/2017    DYEDWWKE95 454 01/04/2017    FOLATE 13.6 01/04/2017     Cardiac: Lab Results   Component Value Date    TROPONINI <0.006 07/05/2018    BNP 1,917 (H) 07/05/2018     Trended Lab Data:    Recent Labs  Lab 07/05/18  0940   WBC 7.93   HGB 8.6*   HCT 27.0*      MCV 94   RDW 15.2*   *   K 5.1   CL 99   CO2 24   BUN 65*   *   PROT 7.6   ALBUMIN 3.4*   BILITOT 1.1*   AST 10   ALKPHOS 145*   ALT 13     Trended Cardiac Data:    Recent Labs  Lab 07/05/18  0940   TROPONINI <0.006   BNP 1,917*       EF   Date Value Ref Range Status   01/05/2017 65 55 - 65    11/16/2016 35 (A) 55 - 65        No results found for this visit on 07/05/18.    Microbiology Results (last 7 days)      ** No results found for the last 168 hours. **          Urinalysis: Lab Results   Component Value Date    COLORU Yellow 07/05/2018    SPECGRAV 1.020 07/05/2018    NITRITE Negative 07/05/2018    KETONESU Negative 07/05/2018    UROBILINOGEN Negative 07/05/2018     Radiology:   X-Ray Chest AP Portable   Final Result      Please see above         Electronically signed by: Nav Tiwari DO   Date:    07/05/2018   Time:    09:51          Assessment/Plan    Acute Exacerbation of HFrEF  - SOB and generalized fatigue x 1 week  - question of medication compliance   - 3+ pitting edema up to mid thighs  - CXR in ED: cardiomegaly and pulmonary vascular congestion along with pulmonary edema and pleural effusions consistent w/ CHF  - BNP 1917  - troponin negative  - Echo 1/2017 with EF of 65%  - Hold home BB and CCB in setting acute HFrEF exacerbation.   - Hold home lisinopril in setting of JANEL  - Lasix 80 mg IV in ED  - will start on lasix 80 IV BID  - daily weights  - strict I/O     JANEL on CKD5  - Likely secondary to cardio-renal syndrome due to volume overload secondary to HFrEF  - On admission, Cr 4.1  - baseline Cr appears to be ~2.7  - Hold home lisinopril in setting of JANEL  - diuresed with 80 lasix IV BID  - will continue aggressive diuresis in an effort to improve kidney function    Hyperglycemia  -initial glucose of 514 in ED  -given 10 units regular insulin with glucose down to 200s  -continue to monitor  -no signs of DKA- no anion gap, bicarb wnl     Hyperkalemia  - On admission, K 5.1  - No EKG changes, no peaked T-waves, no ST-changes  - received 10 units regular insulin in ED for elevated blood glucose  - lasix for diuresis.      HTN  - On admission, /65  - Hold home coreg due to acute HFrEF decompensation      Normocytic Anemia  - H/H 8.6/27.0  - Likely anemia of chronic disease  - Continue home ferrous sulfate/colace     DM2  - A1c 3/2017 9.5  - ISS  - Detemir 10 units BID     HLD  - lipid panel ordered  -  Continue home rosuvastatin    H/O LV Thrombus  - 11/17/16 TTE: small round apical thrombus attached to the lateral part of the apex.  - Patient previously on Warfarin , was d/c'ed after last hospitalization   -patient currently only on ASA  -will resume     Anxiety/Depression  - Stable, denies SI/HI  - Continue home citalopram and trazodone qHS     DVT PPx: SCDs   Diet: Renal  Code: Full     Disposition: pending clinical improvement, f/u urine output       John Wadsworth MD  [employer] [facility] PGY2  07/05/2018

## 2018-07-05 NOTE — PLAN OF CARE
Problem: Patient Care Overview  Goal: Plan of Care Review  Outcome: Ongoing (interventions implemented as appropriate)  Reviewed plan of care with patient. Patient verbalized understanding. AAOx3. Pt is bilingual - prefers  service. MARYSOL Stokes at bedside to serve as . Pt on diabetic 2000 calorie diet; tolerates PO meds whole. Voids spontaneously per urinal. Up with assist - ALAS. Pt remains on 3L NC; sats low-mid 90s. Denies pain. Blood glucose monitored and covered appropriately per sliding scale. SCDs on. Legs elevated on pillows. Patient on continuous tele monitoring; SR; HR 70s-80s. Bed alarm set, bed in lowest position, call bell in reach. Will continue to monitor.

## 2018-07-06 LAB
ALBUMIN SERPL BCP-MCNC: 2.9 G/DL
ALP SERPL-CCNC: 118 U/L
ALT SERPL W/O P-5'-P-CCNC: 11 U/L
ANION GAP SERPL CALC-SCNC: 7 MMOL/L
AORTIC VALVE REGURGITATION: NORMAL
AST SERPL-CCNC: 10 U/L
BASOPHILS # BLD AUTO: 0.01 K/UL
BASOPHILS NFR BLD: 0.2 %
BILIRUB SERPL-MCNC: 0.6 MG/DL
BUN SERPL-MCNC: 74 MG/DL
CALCIUM SERPL-MCNC: 8.8 MG/DL
CHLORIDE SERPL-SCNC: 102 MMOL/L
CHOLEST SERPL-MCNC: 63 MG/DL
CHOLEST/HDLC SERPL: 3.3 {RATIO}
CO2 SERPL-SCNC: 27 MMOL/L
CREAT SERPL-MCNC: 4.1 MG/DL
DIFFERENTIAL METHOD: ABNORMAL
EOSINOPHIL # BLD AUTO: 0.2 K/UL
EOSINOPHIL NFR BLD: 4.7 %
ERYTHROCYTE [DISTWIDTH] IN BLOOD BY AUTOMATED COUNT: 15.3 %
EST. GFR  (AFRICAN AMERICAN): 17 ML/MIN/1.73 M^2
EST. GFR  (NON AFRICAN AMERICAN): 14 ML/MIN/1.73 M^2
ESTIMATED PA SYSTOLIC PRESSURE: 15.25
GLUCOSE SERPL-MCNC: 176 MG/DL
HCT VFR BLD AUTO: 23.1 %
HDLC SERPL-MCNC: 19 MG/DL
HDLC SERPL: 30.2 %
HGB BLD-MCNC: 7.3 G/DL
LDLC SERPL CALC-MCNC: 29.2 MG/DL
LYMPHOCYTES # BLD AUTO: 0.4 K/UL
LYMPHOCYTES NFR BLD: 7.3 %
MAGNESIUM SERPL-MCNC: 2.6 MG/DL
MCH RBC QN AUTO: 28.9 PG
MCHC RBC AUTO-ENTMCNC: 31.6 G/DL
MCV RBC AUTO: 91 FL
MITRAL VALVE REGURGITATION: NORMAL
MONOCYTES # BLD AUTO: 0.5 K/UL
MONOCYTES NFR BLD: 9.3 %
NEUTROPHILS # BLD AUTO: 3.9 K/UL
NEUTROPHILS NFR BLD: 78.5 %
NONHDLC SERPL-MCNC: 44 MG/DL
PHOSPHATE SERPL-MCNC: 4.4 MG/DL
PLATELET # BLD AUTO: 188 K/UL
PMV BLD AUTO: 8.7 FL
POCT GLUCOSE: 167 MG/DL (ref 70–110)
POCT GLUCOSE: 211 MG/DL (ref 70–110)
POCT GLUCOSE: 236 MG/DL (ref 70–110)
POCT GLUCOSE: 244 MG/DL (ref 70–110)
POTASSIUM SERPL-SCNC: 5.1 MMOL/L
PROT SERPL-MCNC: 6.6 G/DL
RBC # BLD AUTO: 2.53 M/UL
RETIRED EF AND QEF - SEE NOTES: 55 (ref 55–65)
SODIUM SERPL-SCNC: 136 MMOL/L
TRIGL SERPL-MCNC: 74 MG/DL
WBC # BLD AUTO: 4.92 K/UL

## 2018-07-06 PROCEDURE — 36415 COLL VENOUS BLD VENIPUNCTURE: CPT

## 2018-07-06 PROCEDURE — 80061 LIPID PANEL: CPT

## 2018-07-06 PROCEDURE — 83735 ASSAY OF MAGNESIUM: CPT

## 2018-07-06 PROCEDURE — 94761 N-INVAS EAR/PLS OXIMETRY MLT: CPT

## 2018-07-06 PROCEDURE — 80053 COMPREHEN METABOLIC PANEL: CPT

## 2018-07-06 PROCEDURE — 63600175 PHARM REV CODE 636 W HCPCS: Performed by: STUDENT IN AN ORGANIZED HEALTH CARE EDUCATION/TRAINING PROGRAM

## 2018-07-06 PROCEDURE — 27000221 HC OXYGEN, UP TO 24 HOURS

## 2018-07-06 PROCEDURE — 85025 COMPLETE CBC W/AUTO DIFF WBC: CPT

## 2018-07-06 PROCEDURE — 25000003 PHARM REV CODE 250: Performed by: STUDENT IN AN ORGANIZED HEALTH CARE EDUCATION/TRAINING PROGRAM

## 2018-07-06 PROCEDURE — 11000001 HC ACUTE MED/SURG PRIVATE ROOM

## 2018-07-06 PROCEDURE — 84100 ASSAY OF PHOSPHORUS: CPT

## 2018-07-06 RX ADMIN — HYDRALAZINE HYDROCHLORIDE 50 MG: 25 TABLET, FILM COATED ORAL at 02:07

## 2018-07-06 RX ADMIN — FERROUS SULFATE TAB EC 325 MG (65 MG FE EQUIVALENT) 325 MG: 325 (65 FE) TABLET DELAYED RESPONSE at 08:07

## 2018-07-06 RX ADMIN — CITALOPRAM HYDROBROMIDE 20 MG: 20 TABLET ORAL at 08:07

## 2018-07-06 RX ADMIN — TRAZODONE HYDROCHLORIDE 50 MG: 50 TABLET ORAL at 09:07

## 2018-07-06 RX ADMIN — ASPIRIN 81 MG: 81 TABLET, COATED ORAL at 08:07

## 2018-07-06 RX ADMIN — INSULIN ASPART 2 UNITS: 100 INJECTION, SOLUTION INTRAVENOUS; SUBCUTANEOUS at 08:07

## 2018-07-06 RX ADMIN — INSULIN DETEMIR 5 UNITS: 100 INJECTION, SOLUTION SUBCUTANEOUS at 09:07

## 2018-07-06 RX ADMIN — RAMELTEON 8 MG: 8 TABLET, FILM COATED ORAL at 09:07

## 2018-07-06 RX ADMIN — INSULIN ASPART 4 UNITS: 100 INJECTION, SOLUTION INTRAVENOUS; SUBCUTANEOUS at 12:07

## 2018-07-06 RX ADMIN — ROSUVASTATIN CALCIUM 40 MG: 10 TABLET, FILM COATED ORAL at 08:07

## 2018-07-06 RX ADMIN — ONDANSETRON 4 MG: 2 INJECTION INTRAMUSCULAR; INTRAVENOUS at 08:07

## 2018-07-06 RX ADMIN — INSULIN DETEMIR 5 UNITS: 100 INJECTION, SOLUTION SUBCUTANEOUS at 08:07

## 2018-07-06 RX ADMIN — HYDRALAZINE HYDROCHLORIDE 50 MG: 25 TABLET, FILM COATED ORAL at 09:07

## 2018-07-06 RX ADMIN — Medication 1 CAPSULE: at 08:07

## 2018-07-06 RX ADMIN — INSULIN ASPART 4 UNITS: 100 INJECTION, SOLUTION INTRAVENOUS; SUBCUTANEOUS at 05:07

## 2018-07-06 RX ADMIN — FUROSEMIDE 80 MG: 10 INJECTION, SOLUTION INTRAMUSCULAR; INTRAVENOUS at 08:07

## 2018-07-06 RX ADMIN — INSULIN ASPART 2 UNITS: 100 INJECTION, SOLUTION INTRAVENOUS; SUBCUTANEOUS at 09:07

## 2018-07-06 RX ADMIN — HYDRALAZINE HYDROCHLORIDE 50 MG: 25 TABLET, FILM COATED ORAL at 08:07

## 2018-07-06 RX ADMIN — FUROSEMIDE 80 MG: 10 INJECTION, SOLUTION INTRAMUSCULAR; INTRAVENOUS at 05:07

## 2018-07-06 NOTE — PLAN OF CARE
Nurse informed patient (through translation of an [employer] employee 'Alf) that a language line was available, free of charge. Patient refused

## 2018-07-06 NOTE — PLAN OF CARE
Problem: Patient Care Overview  Goal: Plan of Care Review  Outcome: Ongoing (interventions implemented as appropriate)  POC reviewed with patient. Patient AAOx4 and denies any pain. Patient had episode of vomiting this morning, PRN Zofran administered. IV lasix administered. Strict intake and output maintained. Oxygen 2L NC maintained. Patient reports improvement in SOB. Blood glucose monitoring ACHS, PRN insulin administered per sliding scale. Tele monitor on patient reading NSR/sinus jose, HR 50s-60s. No red alarms or ectopy noted. Patient verbalizes clear understanding of POC.

## 2018-07-06 NOTE — PLAN OF CARE
Problem: Patient Care Overview  Goal: Plan of Care Review  Outcome: Ongoing (interventions implemented as appropriate)  Patient on oxygen with documented flow.  Will attempt to wean per O2 order protocol. No resp distress noted. Decreased to 2L. Will continue to monitor.

## 2018-07-06 NOTE — PLAN OF CARE
Unaccompanied, lives with supportive friend who is available for help and a ride home at [place].    Patient prefers AM appointments, [place] unable to establish with [facility] as that clinic is closed today.    [place] set for his PCP.  Follow-up With  Details  Why  Contact Info   Hemal Eaton MD  Go on 7/12/2018  @ 11am  [address] [id number]  [phone]               07/06/18 1004   Discharge Assessment   Assessment Type Discharge Planning Assessment   Assessment information obtained from? Patient   Prior to hospitilization cognitive status: Alert/Oriented   Prior to hospitalization functional status: Assistive Equipment   Current cognitive status: Alert/Oriented   Current Functional Status: Assistive Equipment   Lives With friend(s)   Able to Return to Prior Arrangements yes   Is patient able to care for self after discharge? Yes   Patient's perception of discharge disposition home or selfcare   Readmission Within The Last 30 Days no previous admission in last 30 days   Patient currently being followed by outpatient case management? No   Patient currently receives any other outside agency services? No   Equipment Currently Used at Home walker, rolling   Do you have any problems affording any of your prescribed medications? No   Is the patient taking medications as prescribed? yes   Does the patient have transportation home? Yes   Discharge Plan A Home with family   Patient/Family In Agreement With Plan yes   Readmission Questionnaire   Have you felt down, depressed, or hopeless? 0   Have you felt little interest or pleasure in doing things? 0

## 2018-07-06 NOTE — PLAN OF CARE
Problem: Patient Care Overview  Goal: Plan of Care Review  Outcome: Ongoing (interventions implemented as appropriate)  Plan of care reviewed with patient. Patient verbalized complete understanding. Fall precautions maintained. Bed in lowest position, locked, call light within reach and bed alarm is on. Side rails up x's 2 with slip resistant socks on.Accuchecks performed, PRN insulin given.  Nurse instructed patient to notify staff for any assistance and the patient verbalized complete understanding. Patient on telemetry throughout shift with no ectopy noted. Will continue to monitor.

## 2018-07-06 NOTE — PROGRESS NOTES
Progress Note  [facility]  Admit Date: 7/5/2018   LOS: 1 day   SUBJECTIVE:   Follow-up For: CHF exacerbation    Patient seen and examined this AM. Reports feeling better overnight and says that he has been peeing alot    ROS   +SOB    OBJECTIVE:   Vital Signs (Most Recent)  Temp: 98 °F (36.7 °C) (07/06/18 1157)  Pulse: 71 (07/06/18 1157)  Resp: (!) 22 (07/06/18 1157)  BP: (!) 143/78 (07/06/18 1157)  SpO2: (!) 92 % (07/06/18 1157)    I & O (Last 24H):  Intake/Output Summary (Last 24 hours) at 07/06/18 1225  Last data filed at 07/06/18 1032   Gross per 24 hour   Intake              485 ml   Output              575 ml   Net              -90 ml     Wt Readings from Last 3 Encounters:   07/06/18 96.3 kg (212 lb 4.9 oz)   01/31/17 84.9 kg (187 lb 2.7 oz)   01/26/17 86.6 kg (191 lb)       Current Diet Order   Procedures    Diet diabetic [facility]; 2000 Calorie     Low sodium     Order Specific Question:   Indicate patient location for additional diet options:     Answer:   [facility]     Order Specific Question:   Total calories:     Answer:   2000 Calorie        Physical Exam   Constitutional: He is oriented to person, place, and time and well-developed, well-nourished, and in no distress.   HENT:   Head: Normocephalic and atraumatic.   Eyes: EOM are normal.   Neck: Normal range of motion.   Cardiovascular: Normal rate and regular rhythm.    Pulmonary/Chest: Effort normal. No respiratory distress. He has decreased breath sounds.   Abdominal: Soft. Bowel sounds are normal. He exhibits no distension. There is no tenderness.   Musculoskeletal: Normal range of motion. He exhibits no edema or tenderness.   2+ edema lower etremities   Neurological: He is alert and oriented to person, place, and time.   Skin: Skin is warm and dry.         Laboratory Data:  CBC    Recent Labs  Lab 07/05/18  0940 07/06/18  0444   WBC 7.93 4.92   RBC 2.87* 2.53*   HGB 8.6* 7.3*   HCT 27.0* 23.1*    188   MCV 94 91    MCH 30.0 28.9   MCHC 31.9* 31.6*     CMP    Recent Labs  Lab 07/05/18  0940 07/06/18  0444   CALCIUM 9.2 8.8   PROT 7.6 6.6   * 136   K 5.1 5.1   CO2 24 27   CL 99 102   BUN 65* 74*   CREATININE 4.1* 4.1*   ALKPHOS 145* 118   ALT 13 11   AST 10 10   BILITOT 1.1* 0.6     POCT-Glucose  POCT Glucose   Date Value Ref Range Status   07/06/2018 211 (H) 70 - 110 mg/dL Final   07/06/2018 167 (H) 70 - 110 mg/dL Final   07/05/2018 314 (H) 70 - 110 mg/dL Final   07/05/2018 265 (H) 70 - 110 mg/dL Final   07/05/2018 276 (H) 70 - 110 mg/dL Final   07/05/2018 352 (H) 70 - 110 mg/dL Final   07/05/2018 452 (HH) 70 - 110 mg/dL Final     COAGS    Recent Labs  Lab 07/05/18  0940   INR 1.1     UA  No results for input(s): COLORU, CLARITYU, SPECGRAV, PHUR, PROTEINUA, GLUCOSEU, BLOODU, WBCU, RBCU, BACTERIA, MUCUS in the last 24 hours.    Invalid input(s):  BILIRUBINCON  MICRO  Microbiology Results (last 7 days)     ** No results found for the last 168 hours. **        LIPID PANEL  Lab Results   Component Value Date    CHOL 63 (L) 07/06/2018     Lab Results   Component Value Date    HDL 19 (L) 07/06/2018     Lab Results   Component Value Date    LDLCALC 29.2 (L) 07/06/2018     Lab Results   Component Value Date    TRIG 74 07/06/2018     Lab Results   Component Value Date    CHOLHDL 30.2 07/06/2018       Diagnostic Results:  Imaging in last 24 hours: reviewed    ASSESSMENT/PLAN:   Ambrocio Chin is a 67 y.o. male    Assessment/Plan    Acute Exacerbation of HFrEF  - SOB and generalized fatigue x 1 week  - question of medication compliance   - 3+ pitting edema up to mid thighs  - CXR in ED: cardiomegaly and pulmonary vascular congestion along with pulmonary edema and pleural effusions consistent w/ CHF  - BNP 1917  - troponin negative  - Echo 1/2017 with EF of 65%  - Hold home BB and CCB in setting acute HFrEF exacerbation.   - Hold home lisinopril in setting of JANEL  - Lasix 80 mg IV in ED  - will start on lasix 80 IV BID  - daily  weights  - strict I/O   - down 500 cc over night. Continue to diurese    JANEL on CKD5  - Likely secondary to cardio-renal syndrome due to volume overload secondary to HFrEF  - On admission, Cr 4.1  - baseline Cr appears to be ~2.7  - Hold home lisinopril in setting of JANEL  - diuresed with 80 lasix IV BID  - will continue aggressive diuresis in an effort to improve kidney function  -Cr remained at 4.1 this am- continue diuresis    Hyperglycemia  -initial glucose of 514 in ED  -given 10 units regular insulin with glucose down to 200s  -continue to monitor  -no signs of DKA- no anion gap, bicarb wnl     Hyperkalemia  - On admission, K 5.1  - No EKG changes, no peaked T-waves, no ST-changes  - received 10 units regular insulin in ED for elevated blood glucose  - lasix for diuresis.   -stable this am     HTN  -/78 this am  - Hold home coreg due to acute HFrEF decompensation      Normocytic Anemia  - H/H 8.6/27.0  - Likely anemia of chronic disease  - Continue home ferrous sulfate/colace     DM2  - ISS  - Detemir 10 units BID     HLD  - lipid panel ordered  - Continue home rosuvastatin    H/O LV Thrombus  - 11/17/16 TTE: small round apical thrombus attached to the lateral part of the apex.  - Patient previously on Warfarin , was d/c'ed after last hospitalization   -patient currently only on ASA  -will resume     Anxiety/Depression  - Stable, denies SI/HI  - Continue home citalopram and trazodone qHS     DVT PPx: SCDs   Diet: Renal  Code: Full     Dispo: F/u urine output, Cr         7/6/2018 oJhn Wadsworth MD  12:25 PM

## 2018-07-07 LAB
ALBUMIN SERPL BCP-MCNC: 3.1 G/DL
ALP SERPL-CCNC: 133 U/L
ALT SERPL W/O P-5'-P-CCNC: 13 U/L
ANION GAP SERPL CALC-SCNC: 8 MMOL/L
AST SERPL-CCNC: 13 U/L
BACTERIA #/AREA URNS HPF: NORMAL /HPF
BASOPHILS # BLD AUTO: 0.02 K/UL
BASOPHILS NFR BLD: 0.3 %
BILIRUB SERPL-MCNC: 0.6 MG/DL
BILIRUB UR QL STRIP: NEGATIVE
BUN SERPL-MCNC: 78 MG/DL
CALCIUM SERPL-MCNC: 8.6 MG/DL
CHLORIDE SERPL-SCNC: 103 MMOL/L
CLARITY UR: CLEAR
CO2 SERPL-SCNC: 27 MMOL/L
COLOR UR: YELLOW
CREAT SERPL-MCNC: 4.2 MG/DL
CREAT UR-MCNC: 54.9 MG/DL
DIFFERENTIAL METHOD: ABNORMAL
EOSINOPHIL # BLD AUTO: 0.3 K/UL
EOSINOPHIL NFR BLD: 4.5 %
ERYTHROCYTE [DISTWIDTH] IN BLOOD BY AUTOMATED COUNT: 15.2 %
EST. GFR  (AFRICAN AMERICAN): 16 ML/MIN/1.73 M^2
EST. GFR  (NON AFRICAN AMERICAN): 14 ML/MIN/1.73 M^2
GLUCOSE SERPL-MCNC: 170 MG/DL
GLUCOSE UR QL STRIP: NEGATIVE
HCT VFR BLD AUTO: 25.4 %
HGB BLD-MCNC: 8 G/DL
HGB UR QL STRIP: NEGATIVE
HYALINE CASTS #/AREA URNS LPF: 0 /LPF
KETONES UR QL STRIP: NEGATIVE
LEUKOCYTE ESTERASE UR QL STRIP: NEGATIVE
LYMPHOCYTES # BLD AUTO: 0.4 K/UL
LYMPHOCYTES NFR BLD: 7.6 %
MAGNESIUM SERPL-MCNC: 2.6 MG/DL
MCH RBC QN AUTO: 28.9 PG
MCHC RBC AUTO-ENTMCNC: 31.5 G/DL
MCV RBC AUTO: 92 FL
MICROSCOPIC COMMENT: NORMAL
MONOCYTES # BLD AUTO: 0.6 K/UL
MONOCYTES NFR BLD: 10.1 %
NEUTROPHILS # BLD AUTO: 4.5 K/UL
NEUTROPHILS NFR BLD: 77.3 %
NITRITE UR QL STRIP: NEGATIVE
PH UR STRIP: 6 [PH] (ref 5–8)
PHOSPHATE SERPL-MCNC: 5.4 MG/DL
PLATELET # BLD AUTO: 228 K/UL
PMV BLD AUTO: 9.2 FL
POCT GLUCOSE: 170 MG/DL (ref 70–110)
POCT GLUCOSE: 248 MG/DL (ref 70–110)
POCT GLUCOSE: 282 MG/DL (ref 70–110)
POCT GLUCOSE: 287 MG/DL (ref 70–110)
POTASSIUM SERPL-SCNC: 4.7 MMOL/L
PROT SERPL-MCNC: 7 G/DL
PROT UR QL STRIP: ABNORMAL
PROT UR-MCNC: 34 MG/DL
PROT/CREAT RATIO, UR: 0.62
RBC # BLD AUTO: 2.77 M/UL
RBC #/AREA URNS HPF: 1 /HPF (ref 0–4)
SODIUM SERPL-SCNC: 138 MMOL/L
SP GR UR STRIP: 1.02 (ref 1–1.03)
URN SPEC COLLECT METH UR: ABNORMAL
UROBILINOGEN UR STRIP-ACNC: 1 EU/DL
WBC # BLD AUTO: 5.82 K/UL
WBC #/AREA URNS HPF: 2 /HPF (ref 0–5)

## 2018-07-07 PROCEDURE — 63600175 PHARM REV CODE 636 W HCPCS: Performed by: STUDENT IN AN ORGANIZED HEALTH CARE EDUCATION/TRAINING PROGRAM

## 2018-07-07 PROCEDURE — 83735 ASSAY OF MAGNESIUM: CPT

## 2018-07-07 PROCEDURE — 85025 COMPLETE CBC W/AUTO DIFF WBC: CPT

## 2018-07-07 PROCEDURE — 27000221 HC OXYGEN, UP TO 24 HOURS

## 2018-07-07 PROCEDURE — 25000003 PHARM REV CODE 250: Performed by: STUDENT IN AN ORGANIZED HEALTH CARE EDUCATION/TRAINING PROGRAM

## 2018-07-07 PROCEDURE — 36415 COLL VENOUS BLD VENIPUNCTURE: CPT

## 2018-07-07 PROCEDURE — 84100 ASSAY OF PHOSPHORUS: CPT

## 2018-07-07 PROCEDURE — 80053 COMPREHEN METABOLIC PANEL: CPT

## 2018-07-07 PROCEDURE — 94761 N-INVAS EAR/PLS OXIMETRY MLT: CPT

## 2018-07-07 PROCEDURE — 11000001 HC ACUTE MED/SURG PRIVATE ROOM

## 2018-07-07 PROCEDURE — 82570 ASSAY OF URINE CREATININE: CPT

## 2018-07-07 PROCEDURE — 81000 URINALYSIS NONAUTO W/SCOPE: CPT

## 2018-07-07 RX ORDER — FUROSEMIDE 80 MG/1
80 TABLET ORAL 2 TIMES DAILY
Qty: 60 TABLET | Refills: 11 | Status: SHIPPED | OUTPATIENT
Start: 2018-07-07 | End: 2019-07-07

## 2018-07-07 RX ORDER — FUROSEMIDE 40 MG/1
80 TABLET ORAL 2 TIMES DAILY
Status: DISCONTINUED | OUTPATIENT
Start: 2018-07-07 | End: 2018-07-08

## 2018-07-07 RX ORDER — METOLAZONE 5 MG/1
5 TABLET ORAL DAILY
Status: DISCONTINUED | OUTPATIENT
Start: 2018-07-08 | End: 2018-07-08

## 2018-07-07 RX ADMIN — ASPIRIN 81 MG: 81 TABLET, COATED ORAL at 08:07

## 2018-07-07 RX ADMIN — RAMELTEON 8 MG: 8 TABLET, FILM COATED ORAL at 08:07

## 2018-07-07 RX ADMIN — Medication 1 CAPSULE: at 08:07

## 2018-07-07 RX ADMIN — INSULIN DETEMIR 5 UNITS: 100 INJECTION, SOLUTION SUBCUTANEOUS at 08:07

## 2018-07-07 RX ADMIN — FERROUS SULFATE TAB EC 325 MG (65 MG FE EQUIVALENT) 325 MG: 325 (65 FE) TABLET DELAYED RESPONSE at 08:07

## 2018-07-07 RX ADMIN — HYDRALAZINE HYDROCHLORIDE 50 MG: 25 TABLET, FILM COATED ORAL at 08:07

## 2018-07-07 RX ADMIN — INSULIN ASPART 3 UNITS: 100 INJECTION, SOLUTION INTRAVENOUS; SUBCUTANEOUS at 08:07

## 2018-07-07 RX ADMIN — CITALOPRAM HYDROBROMIDE 20 MG: 20 TABLET ORAL at 08:07

## 2018-07-07 RX ADMIN — INSULIN ASPART 4 UNITS: 100 INJECTION, SOLUTION INTRAVENOUS; SUBCUTANEOUS at 04:07

## 2018-07-07 RX ADMIN — ROSUVASTATIN CALCIUM 40 MG: 10 TABLET, FILM COATED ORAL at 08:07

## 2018-07-07 RX ADMIN — FUROSEMIDE 80 MG: 40 TABLET ORAL at 08:07

## 2018-07-07 RX ADMIN — TRAZODONE HYDROCHLORIDE 50 MG: 50 TABLET ORAL at 08:07

## 2018-07-07 RX ADMIN — FUROSEMIDE 80 MG: 10 INJECTION, SOLUTION INTRAMUSCULAR; INTRAVENOUS at 08:07

## 2018-07-07 RX ADMIN — HYDRALAZINE HYDROCHLORIDE 50 MG: 25 TABLET, FILM COATED ORAL at 02:07

## 2018-07-07 RX ADMIN — INSULIN ASPART 6 UNITS: 100 INJECTION, SOLUTION INTRAVENOUS; SUBCUTANEOUS at 11:07

## 2018-07-07 NOTE — PLAN OF CARE
Problem: Patient Care Overview  Goal: Plan of Care Review  Plan of care reviewed with patient, understanding verbalized. Pt remains NSR/sinus jose on tele. No complains overnight. Bed alarm on, call light in reach, fall precautions in place. Will continue to monitor.

## 2018-07-07 NOTE — PLAN OF CARE
[facility] ,[facility] alarm on. Denies any discomfort SCDs in place. Will  Start weaning off o2 as ordered.

## 2018-07-07 NOTE — PLAN OF CARE
Problem: Patient Care Overview  Goal: Plan of Care Review  Outcome: Ongoing (interventions implemented as appropriate)  Patient on oxygen with documented flow.  Will attempt to wean per O2 order protocol.Will continue to monitor.

## 2018-07-07 NOTE — CONSULTS
[facility]  Nephrology  Consult Note    Patient Name: Ambrocio Chin  MRN: [id number]  Admission Date: 7/5/2018  Hospital Length of Stay: 2 days  Attending Provider: Felipe Rivers III, MD   Primary Care Physician: Berhane  Principal Problem:Acute on chronic systolic heart failure  Date of service 7/7/2018    Inpatient consult to [facility] (Waqas Schmitz, [place])  Consult performed by: ELIEL WEBSTER  Consult ordered by: MATEUS LEVIN  Reason for consult: Renal failure        Subjective:     HPI: 66yo M with PMH Sys HF, CAD, DM, CKD4 who follows with Dr Schmitz as an outpatient, presented to our facility with progressively worsening SOB and BLE edema that began 5 days prior to admission.  SOB worsened by exertion, does not use home O2 normally, +orthopnea.  Associated with fatigue.    Since admission, UOP has increased after IV Lasix 80mg BID given, edema improved and SOB improved, weaned from 3L to 1L O2 NC. His renal function remains relatively stable on labs although very poor.  Some nausea yesterday, but resolved.  He states that he has discussed dialysis with Dr Schmitz but has not done dialysis access planning yet.  He thinks he last saw Dr Schmitz ~5-6 months ago.    Past Medical History:   Diagnosis Date    CHF (congestive heart failure)     Diabetes mellitus     Hypertension     PVD (peripheral vascular disease)     Stroke     Lt pontine stroke 11/15/2016       Past Surgical History:   Procedure Laterality Date    TOE AMPUTATION Left     5th toe       Review of patient's allergies indicates:  No Known Allergies  Current Facility-Administered Medications   Medication Frequency    aspirin EC tablet 81 mg Daily    citalopram tablet 20 mg Daily    dextrose 50% injection 12.5 g PRN    dextrose 50% injection 25 g PRN    ferrous sulfate EC tablet 325 mg Daily    furosemide tablet 80 mg BID    glucagon (human recombinant) injection 1 mg PRN    glucose  chewable tablet 16 g PRN    glucose chewable tablet 24 g PRN    hydrALAZINE tablet 50 mg TID    insulin aspart U-100 pen 1-10 Units QID (AC + HS) PRN    insulin detemir U-100 pen 5 Units BID    [START ON 7/8/2018] metOLazone tablet 5 mg Daily    ondansetron injection 4 mg Q6H PRN    pneumoc 13-eugenio conj-dip cr(PF) 0.5 mL vaccine x 1 dose    ramelteon tablet 8 mg QHS    rosuvastatin tablet 40 mg Daily    sodium chloride 0.9% flush 5 mL PRN    traZODone tablet 50 mg QHS    vitamin renal formula (B-complex-vitamin c-folic acid) 1 mg per capsule 1 capsule Daily     Family History     Problem Relation (Age of Onset)    Diabetes Father    Stroke Mother        Social History Main Topics    Smoking status: Never Smoker    Smokeless tobacco: Not on file    Alcohol use No    Drug use: No    Sexual activity: Not on file     Review of Systems   Constitutional: Positive for chills. Negative for appetite change and fever.   HENT:        No metallic taste   Eyes: Negative for visual disturbance.   Respiratory: Positive for shortness of breath.    Cardiovascular: Positive for leg swelling. Negative for chest pain.   Gastrointestinal: Negative for abdominal pain, diarrhea, nausea and vomiting.   Genitourinary: Negative for decreased urine volume, difficulty urinating, dysuria and hematuria.   Skin: Negative for rash and wound.   Neurological: Negative for tremors and weakness.   Psychiatric/Behavioral: Negative for confusion.   All other systems reviewed and are negative.    Objective:     Vital Signs (Most Recent):  Temp: 96.4 °F (35.8 °C) (07/07/18 1223)  Pulse: 80 (07/07/18 1223)  Resp: 15 (07/07/18 1223)  BP: (!) 146/74 (07/07/18 1223)  SpO2: 96 % (07/07/18 1300)  O2 Device (Oxygen Therapy): nasal cannula (07/07/18 1300) Vital Signs (24h Range):  Temp:  [96.4 °F (35.8 °C)-98.9 °F (37.2 °C)] 96.4 °F (35.8 °C)  Pulse:  [60-85] 80  Resp:  [15-21] 15  SpO2:  [92 %-97 %] 96 %  BP: (123-168)/(63-84) 146/74     Weight:  96.3 kg (212 lb 4.9 oz) (07/06/18 0700)  Body mass index is 31.35 kg/m².  Body surface area is 2.17 meters squared.    I/O last 3 completed shifts:  In: 1265 [P.O.:1265]  Out: 2000 [Urine:2000]    Physical Exam   Constitutional: He is oriented to person, place, and time. He appears well-developed and well-nourished. No distress.   HENT:   Head: Normocephalic and atraumatic.   Eyes: EOM are normal. No scleral icterus.   Cardiovascular: Normal rate and regular rhythm.  Exam reveals no friction rub.    No murmur heard.  Pulmonary/Chest: Effort normal. No respiratory distress. He has no wheezes. He has no rales.   Abdominal: Soft. There is no tenderness.   Musculoskeletal: He exhibits edema (trace BLE). He exhibits no tenderness.   Neurological: He is alert and oriented to person, place, and time.   No asterixis   Skin: Skin is warm and dry. No rash noted. No erythema.   Psychiatric: He has a normal mood and affect. His behavior is normal. Thought content normal.   Nursing note and vitals reviewed.      Significant Labs:  CBC:   Recent Labs  Lab 07/07/18  0404   WBC 5.82   RBC 2.77*   HGB 8.0*   HCT 25.4*      MCV 92   MCH 28.9   MCHC 31.5*     CMP:   Recent Labs  Lab 07/07/18  0404   *   CALCIUM 8.6*   ALBUMIN 3.1*   PROT 7.0      K 4.7   CO2 27      BUN 78*   CREATININE 4.2*   ALKPHOS 133   ALT 13   AST 13   BILITOT 0.6       Recent Labs  Lab 07/05/18  1225   COLORU Yellow   SPECGRAV 1.020   PHUR 6.0   PROTEINUA 1+*   BACTERIA None   NITRITE Negative   LEUKOCYTESUR Negative   UROBILINOGEN Negative   HYALINECASTS 0     All labs within the past 24 hours have been reviewed.    Significant Imaging:  CXR:   Ill-defined perihilar and bibasilar lung opacities concerning for vascular congestion and edema similar to prior.  There is poor definition of the lung bases concerning for superimposed effusions greater on the left.  There is no large pneumothorax.  Clinical correlation and continued follow-up  advised   Impression         Echo: 7/5/18    1 - Normal left ventricular systolic function (EF 55-60%).     2 - No wall motion abnormalities.     3 - Mild left atrial enlargement.     4 - Normal right ventricular systolic function .     Assessment/Plan:     Active Diagnoses:    Diagnosis Date Noted POA    PRINCIPAL PROBLEM:  Acute on chronic systolic heart failure [I50.23] 11/15/2016 Yes    Acute pulmonary edema [J81.0] 07/05/2018 Yes    Left-sided cerebrovascular accident (CVA) [I63.9] 11/18/2016 Yes    Essential hypertension [I10] 11/15/2016 Yes    Type 2 diabetes mellitus with hyperglycemia, with long-term current use of insulin [E11.65, Z79.4] 11/15/2016 Not Applicable    Hyperlipidemia [E78.5] 11/15/2016 Yes     Chronic    JANEL (acute kidney injury) [N17.9] 11/15/2016 Yes    CKD stage 3 due to type 2 diabetes mellitus [E11.22, N18.3] 11/15/2016 Yes      Problems Resolved During this Admission:    Diagnosis Date Noted Date Resolved POA       ARF on CKD4 vs progression to CKD5  - follows with Dr Schmitz as an outpatient  - last Cr in our system was 2.6-3.7 on 4/2017  - suspect progression of CKD, will refer to Dr Chau for dialysis access planning  - UOP adequate, ok to convert IV Lasix to PO Lasix 80mg BID with Metolazone 5mg PO QDay  - stable from renal standpoint for discharge  - will need f/u with Dr Schmitz in 1-2 weeks with labs prior, orders placed  - vein mapping done 1/2017 may need to be updated if plans for HD  - renally dose medications for GFR <15  - avoid nephrotoxic agents including NSAIDs  - hold Lisinopril on discharge    Anemia of chronic renal failure  - check iron studies on discharge    Hypervolemia, Acute on chronic systolic heart failure  - volume status improved with IV diuresis, ok to convert to PO from renal standpoint  - PO Lasix 80 BID, metolazone 5 qday    Hyperphosphatemia  - low phos diet, monitor lab    DM  PVD  H/o LV thrombus  Anxiety/depression    Thank you for allowing me  to participate in the care of your patient.  Please call with any questions.    Date of service: 7/7/2018  Venita Cohen MD   Nephrology  [facility]  Office [phone]   [facility] covering for:  [facility]  MATT Schmitz MD, CASEY MARRERO MD,   MD HEIDI Lira, NP  [address]  [place], [id number]  [phone]

## 2018-07-07 NOTE — PROGRESS NOTES
PGY-2 Progress Note [facility]    Follow up for:   Chief Complaint   Patient presents with    Shortness of Breath     SOB x4-5 days and worsenign lower ext edema    Shortness of Breath     Hospital Stay Day 2    Subjective: AF VSS, denies any CP, N/V/D, headaches, fever or chills.  Patient reports shortness of breath is much improved since admit.  He also reports good UOP.      Scheduled Meds:   aspirin  81 mg Oral Daily    citalopram  20 mg Oral Daily    ferrous sulfate  325 mg Oral Daily    furosemide  80 mg Intravenous BID    hydrALAZINE  50 mg Oral TID    insulin detemir U-100  5 Units Subcutaneous BID    ramelteon  8 mg Oral QHS    rosuvastatin  40 mg Oral Daily    traZODone  50 mg Oral QHS    vitamin renal formula (B-complex-vitamin c-folic acid)  1 capsule Oral Daily     Continuous Infusions:  PRN Meds:dextrose 50%, dextrose 50%, glucagon (human recombinant), glucose, glucose, insulin aspart U-100, ondansetron, pneumoc 13-eugenio conj-dip cr(PF), sodium chloride 0.9%    Review of patient's allergies indicates:  No Known Allergies    Objectives:     Vitals(Most Recent)      BP  Min: 123/63  Max: 168/84  Temp  Av.3 °F (36.8 °C)  Min: 97.1 °F (36.2 °C)  Max: 99.1 °F (37.3 °C)  Pulse  Av.8  Min: 60  Max: 79  Resp  Av  Min: 16  Max: 22  SpO2  Av.2 %  Min: 92 %  Max: 97 %             Vitals(Ymvu90m)  Temp:  [97.1 °F (36.2 °C)-99.1 °F (37.3 °C)]   Pulse:  [60-79]   Resp:  [16-22]   BP: (123-168)/(63-84)   SpO2:  [92 %-97 %]     I & O([id number])    Intake/Output Summary (Last 24 hours) at 18 0804  Last data filed at 18 0746   Gross per 24 hour   Intake             1140 ml   Output             1775 ml   Net             -635 ml     General: AAOx3. NAD.  HEENT: NCAT. PERRLA. EOMI.   Neck: Supple. No JVD.     CV: RRR, no murmurs  Chest: decreased breath sounds bilaterally   Abd: Soft. Bowel sounds present, ND/NT.   Ext: trace bilateral lower extremity edema   Skin: Intact. No rash. No  lesions.   Neuro: CN II-XII intact. No focal deficit. Strength 5/5 throughout. Sensation intact.   Psych: Good judgement and reason. No A/V hallucinations. NL affect. No abnormal behaviors noted    LABS  CBC    Recent Labs  Lab 07/05/18  0940 07/06/18 0444 07/07/18  0404   WBC 7.93 4.92 5.82   RBC 2.87* 2.53* 2.77*   HGB 8.6* 7.3* 8.0*   HCT 27.0* 23.1* 25.4*    188 228   MCV 94 91 92   MCH 30.0 28.9 28.9   MCHC 31.9* 31.6* 31.5*     BMP    Recent Labs  Lab 07/05/18 0940 07/06/18 0444 07/07/18  0404   * 136 138   K 5.1 5.1 4.7   CO2 24 27 27   CL 99 102 103   BUN 65* 74* 78*   CREATININE 4.1* 4.1* 4.2*   * 176* 170*       POCT-Glucose  POCT Glucose   Date Value Ref Range Status   07/07/2018 170 (H) 70 - 110 mg/dL Final   07/06/2018 244 (H) 70 - 110 mg/dL Final   07/06/2018 236 (H) 70 - 110 mg/dL Final   07/06/2018 211 (H) 70 - 110 mg/dL Final   07/06/2018 167 (H) 70 - 110 mg/dL Final   07/05/2018 314 (H) 70 - 110 mg/dL Final   07/05/2018 265 (H) 70 - 110 mg/dL Final   07/05/2018 276 (H) 70 - 110 mg/dL Final   07/05/2018 352 (H) 70 - 110 mg/dL Final   07/05/2018 452 (HH) 70 - 110 mg/dL Final         Recent Labs  Lab 07/05/18  0940 07/06/18 0444 07/07/18  0404   CALCIUM 9.2 8.8 8.6*   MG  --  2.6 2.6   PHOS  --  4.4 5.4*     LFT    Recent Labs  Lab 07/05/18  0940 07/06/18 0444 07/07/18  0404   PROT 7.6 6.6 7.0   ALBUMIN 3.4* 2.9* 3.1*   BILITOT 1.1* 0.6 0.6   AST 10 10 13   ALKPHOS 145* 118 133   ALT 13 11 13     COAGS    Recent Labs  Lab 07/05/18  0940   INR 1.1     CE    Recent Labs  Lab 07/05/18  0940   TROPONINI <0.006     BNP    Recent Labs  Lab 07/05/18  0940   BNP 1,917*     LAST HbA1c  Lab Results   Component Value Date    HGBA1C 8.3 (H) 07/05/2018     Imaging  Imaging Results          X-Ray Chest AP Portable (Final result)  Result time 07/05/18 09:51:32    Final result by Nav Tiwari DO (07/05/18 09:51:32)                 Impression:      Please see above      Electronically  signed by: Nav Tiwari DO  Date:    07/05/2018  Time:    09:51             Narrative:    EXAMINATION:  XR CHEST AP PORTABLE    CLINICAL HISTORY:  Shortness of breath;    TECHNIQUE:  Single frontal view of the chest was performed.    COMPARISON:  01/05/2017    FINDINGS:  Ill-defined perihilar and bibasilar lung opacities concerning for vascular congestion and edema similar to prior.  There is poor definition of the lung bases concerning for superimposed effusions greater on the left.  There is no large pneumothorax.  Clinical correlation and continued follow-up advised                                Micro:  Microbiology Results (last 7 days)     ** No results found for the last 168 hours. **         Assessment/Plan:     Acute Exacerbation of HFrEF  - SOB and generalized fatigue x 1 week  - question of medication compliance   - 3+ pitting edema up to mid thighs  - CXR in ED: cardiomegaly and pulmonary vascular congestion along with pulmonary edema and pleural effusions consistent w/ CHF  - BNP 1917  - troponin negative  - Echo 1/2017 with EF of 65%  - Hold home BB and CCB in setting acute HFrEF exacerbation.   - Hold home lisinopril in setting of JANEL  - Lasix 80 mg IV in ED  - will start on lasix 80 IV BID  - daily weights  - strict I/O   - 1 L urine output since admission     JANEL on CKD5  - Likely secondary to cardio-renal syndrome due to volume overload secondary to HFrEF  - On admission, Cr 4.1  - baseline Cr appears to be ~2.7  - Hold home lisinopril in setting of JANEL  - diuresed with 80 lasix IV BID  - will continue aggressive diuresis in an effort to improve kidney function  -Cr remained at 4.1 this am- continue diuresis  - Patient seen by Dr. Schmitz, nephrology.  Have consulted and will follow-up on recs      Hyperglycemia  -initial glucose of 514 in ED  -given 10 units regular insulin with glucose down to 200s  -continue to monitor  -no signs of DKA- no anion gap, bicarb wnl     Hyperkalemia- resolved  - On  admission, K 5.1  - No EKG changes, no peaked T-waves, no ST-changes  - received 10 units regular insulin in ED for elevated blood glucose  - lasix for diuresis.   -stable this am     HTN  -/78 this am  - Hold home coreg due to acute HFrEF decompensation      Normocytic Anemia  - H/H 8.6/27.0  - Likely anemia of chronic disease  - Continue home ferrous sulfate/colace     DM2  - ISS  - Detemir 10 units BID     HLD  - lipid panel ordered  - Continue home rosuvastatin     H/O LV Thrombus  - 11/17/16 TTE: small round apical thrombus attached to the lateral part of the apex.  - Patient previously on Warfarin , was d/c'ed after last hospitalization   -patient currently only on ASA  -will resume     Anxiety/Depression  - Stable, denies SI/HI  - Continue home citalopram and trazodone qHS     DVT PPx: SCDs   Diet: Renal  Code: Full  Dispo: F/u urine output, Cr    Case discussed with staff    Alyson Santana MD  [facility] HO2  07/07/2018

## 2018-07-08 LAB
ALBUMIN SERPL BCP-MCNC: 3 G/DL
ALP SERPL-CCNC: 131 U/L
ALT SERPL W/O P-5'-P-CCNC: 12 U/L
ANION GAP SERPL CALC-SCNC: 10 MMOL/L
ANION GAP SERPL CALC-SCNC: 11 MMOL/L
AST SERPL-CCNC: 13 U/L
BASOPHILS # BLD AUTO: 0.02 K/UL
BASOPHILS NFR BLD: 0.3 %
BILIRUB SERPL-MCNC: 0.6 MG/DL
BUN SERPL-MCNC: 69 MG/DL
BUN SERPL-MCNC: 72 MG/DL
CALCIUM SERPL-MCNC: 8.4 MG/DL
CALCIUM SERPL-MCNC: 8.7 MG/DL
CHLORIDE SERPL-SCNC: 101 MMOL/L
CHLORIDE SERPL-SCNC: 103 MMOL/L
CO2 SERPL-SCNC: 26 MMOL/L
CO2 SERPL-SCNC: 26 MMOL/L
CREAT SERPL-MCNC: 3.9 MG/DL
CREAT SERPL-MCNC: 4 MG/DL
DIFFERENTIAL METHOD: ABNORMAL
EOSINOPHIL # BLD AUTO: 0.2 K/UL
EOSINOPHIL NFR BLD: 3.8 %
ERYTHROCYTE [DISTWIDTH] IN BLOOD BY AUTOMATED COUNT: 15.1 %
EST. GFR  (AFRICAN AMERICAN): 17 ML/MIN/1.73 M^2
EST. GFR  (AFRICAN AMERICAN): 18 ML/MIN/1.73 M^2
EST. GFR  (NON AFRICAN AMERICAN): 15 ML/MIN/1.73 M^2
EST. GFR  (NON AFRICAN AMERICAN): 15 ML/MIN/1.73 M^2
GLUCOSE SERPL-MCNC: 142 MG/DL
GLUCOSE SERPL-MCNC: 175 MG/DL
HCT VFR BLD AUTO: 25.4 %
HGB BLD-MCNC: 7.9 G/DL
LYMPHOCYTES # BLD AUTO: 0.6 K/UL
LYMPHOCYTES NFR BLD: 9.4 %
MAGNESIUM SERPL-MCNC: 2.4 MG/DL
MCH RBC QN AUTO: 28.3 PG
MCHC RBC AUTO-ENTMCNC: 31.1 G/DL
MCV RBC AUTO: 91 FL
MONOCYTES # BLD AUTO: 0.4 K/UL
MONOCYTES NFR BLD: 5.7 %
NEUTROPHILS # BLD AUTO: 5.1 K/UL
NEUTROPHILS NFR BLD: 80.3 %
PHOSPHATE SERPL-MCNC: 5.3 MG/DL
PLATELET # BLD AUTO: 224 K/UL
PMV BLD AUTO: 8.8 FL
POCT GLUCOSE: 156 MG/DL (ref 70–110)
POCT GLUCOSE: 180 MG/DL (ref 70–110)
POCT GLUCOSE: 199 MG/DL (ref 70–110)
POCT GLUCOSE: 258 MG/DL (ref 70–110)
POTASSIUM SERPL-SCNC: 4.2 MMOL/L
POTASSIUM SERPL-SCNC: 4.2 MMOL/L
PROT SERPL-MCNC: 6.7 G/DL
RBC # BLD AUTO: 2.79 M/UL
SODIUM SERPL-SCNC: 138 MMOL/L
SODIUM SERPL-SCNC: 139 MMOL/L
WBC # BLD AUTO: 6.29 K/UL

## 2018-07-08 PROCEDURE — 80048 BASIC METABOLIC PNL TOTAL CA: CPT

## 2018-07-08 PROCEDURE — 85025 COMPLETE CBC W/AUTO DIFF WBC: CPT

## 2018-07-08 PROCEDURE — 84100 ASSAY OF PHOSPHORUS: CPT

## 2018-07-08 PROCEDURE — 36415 COLL VENOUS BLD VENIPUNCTURE: CPT

## 2018-07-08 PROCEDURE — 27000221 HC OXYGEN, UP TO 24 HOURS

## 2018-07-08 PROCEDURE — 25000003 PHARM REV CODE 250: Performed by: STUDENT IN AN ORGANIZED HEALTH CARE EDUCATION/TRAINING PROGRAM

## 2018-07-08 PROCEDURE — 11000001 HC ACUTE MED/SURG PRIVATE ROOM

## 2018-07-08 PROCEDURE — 83735 ASSAY OF MAGNESIUM: CPT

## 2018-07-08 PROCEDURE — 80053 COMPREHEN METABOLIC PANEL: CPT

## 2018-07-08 PROCEDURE — 94799 UNLISTED PULMONARY SVC/PX: CPT

## 2018-07-08 PROCEDURE — 94761 N-INVAS EAR/PLS OXIMETRY MLT: CPT

## 2018-07-08 PROCEDURE — 25000003 PHARM REV CODE 250: Performed by: INTERNAL MEDICINE

## 2018-07-08 RX ORDER — METOLAZONE 5 MG/1
10 TABLET ORAL DAILY
Status: DISCONTINUED | OUTPATIENT
Start: 2018-07-09 | End: 2018-07-09 | Stop reason: HOSPADM

## 2018-07-08 RX ORDER — CARVEDILOL 12.5 MG/1
12.5 TABLET ORAL 2 TIMES DAILY
Status: DISCONTINUED | OUTPATIENT
Start: 2018-07-08 | End: 2018-07-09 | Stop reason: HOSPADM

## 2018-07-08 RX ORDER — FUROSEMIDE 40 MG/1
80 TABLET ORAL 3 TIMES DAILY
Status: DISCONTINUED | OUTPATIENT
Start: 2018-07-08 | End: 2018-07-09 | Stop reason: HOSPADM

## 2018-07-08 RX ADMIN — ROSUVASTATIN CALCIUM 40 MG: 10 TABLET, FILM COATED ORAL at 09:07

## 2018-07-08 RX ADMIN — FERROUS SULFATE TAB EC 325 MG (65 MG FE EQUIVALENT) 325 MG: 325 (65 FE) TABLET DELAYED RESPONSE at 09:07

## 2018-07-08 RX ADMIN — INSULIN ASPART 6 UNITS: 100 INJECTION, SOLUTION INTRAVENOUS; SUBCUTANEOUS at 11:07

## 2018-07-08 RX ADMIN — FUROSEMIDE 80 MG: 40 TABLET ORAL at 09:07

## 2018-07-08 RX ADMIN — CARVEDILOL 12.5 MG: 12.5 TABLET, FILM COATED ORAL at 08:07

## 2018-07-08 RX ADMIN — HYDRALAZINE HYDROCHLORIDE 50 MG: 25 TABLET, FILM COATED ORAL at 09:07

## 2018-07-08 RX ADMIN — Medication 1 CAPSULE: at 09:07

## 2018-07-08 RX ADMIN — METOLAZONE 5 MG: 5 TABLET ORAL at 09:07

## 2018-07-08 RX ADMIN — TRAZODONE HYDROCHLORIDE 50 MG: 50 TABLET ORAL at 08:07

## 2018-07-08 RX ADMIN — HYDRALAZINE HYDROCHLORIDE 50 MG: 25 TABLET, FILM COATED ORAL at 04:07

## 2018-07-08 RX ADMIN — INSULIN ASPART 2 UNITS: 100 INJECTION, SOLUTION INTRAVENOUS; SUBCUTANEOUS at 04:07

## 2018-07-08 RX ADMIN — INSULIN ASPART 2 UNITS: 100 INJECTION, SOLUTION INTRAVENOUS; SUBCUTANEOUS at 07:07

## 2018-07-08 RX ADMIN — RAMELTEON 8 MG: 8 TABLET, FILM COATED ORAL at 08:07

## 2018-07-08 RX ADMIN — FUROSEMIDE 80 MG: 40 TABLET ORAL at 04:07

## 2018-07-08 RX ADMIN — INSULIN DETEMIR 5 UNITS: 100 INJECTION, SOLUTION SUBCUTANEOUS at 08:07

## 2018-07-08 RX ADMIN — INSULIN DETEMIR 5 UNITS: 100 INJECTION, SOLUTION SUBCUTANEOUS at 09:07

## 2018-07-08 RX ADMIN — ASPIRIN 81 MG: 81 TABLET, COATED ORAL at 09:07

## 2018-07-08 RX ADMIN — HYDRALAZINE HYDROCHLORIDE 50 MG: 25 TABLET, FILM COATED ORAL at 08:07

## 2018-07-08 RX ADMIN — FUROSEMIDE 80 MG: 40 TABLET ORAL at 08:07

## 2018-07-08 RX ADMIN — CITALOPRAM HYDROBROMIDE 20 MG: 20 TABLET ORAL at 09:07

## 2018-07-08 NOTE — PROGRESS NOTES
[facility]  Nephrology  Progress Note    Patient Name: Ambrocio Chin  MRN: [id number]  Admission Date: 7/5/2018  Hospital Length of Stay: 3 days  Attending Provider: Felipe Rivers III, MD   Primary Care Physician: Berhane  Principal Problem:Acute on chronic systolic heart failure  Date of service 7/8/2018    Consults   Inpatient consult to [facility] (Waqas Schmitz Nimkevych)  Consult performed by: ELIEL WEBSTER  Consult ordered by: MATEUS LEVIN  Reason for consult: Renal failure    Subjective:     Interval History: He is feeling good, some ALAS but no SOB at rest.  No N/V/D.  Good UOP, 2L per 24 hrs.  Still requiring O2 NC    Review of patient's allergies indicates:  No Known Allergies  Current Facility-Administered Medications   Medication Frequency    aspirin EC tablet 81 mg Daily    citalopram tablet 20 mg Daily    dextrose 50% injection 12.5 g PRN    dextrose 50% injection 25 g PRN    ferrous sulfate EC tablet 325 mg Daily    furosemide tablet 80 mg TID    glucagon (human recombinant) injection 1 mg PRN    glucose chewable tablet 16 g PRN    glucose chewable tablet 24 g PRN    hydrALAZINE tablet 50 mg TID    insulin aspart U-100 pen 1-10 Units QID (AC + HS) PRN    insulin detemir U-100 pen 5 Units BID    [START ON 7/9/2018] metOLazone tablet 10 mg Daily    ondansetron injection 4 mg Q6H PRN    pneumoc 13-eugenio conj-dip cr(PF) 0.5 mL vaccine x 1 dose    ramelteon tablet 8 mg QHS    rosuvastatin tablet 40 mg Daily    sodium chloride 0.9% flush 5 mL PRN    traZODone tablet 50 mg QHS    vitamin renal formula (B-complex-vitamin c-folic acid) 1 mg per capsule 1 capsule Daily       Objective:     Vital Signs (Most Recent):  Temp: 97.6 °F (36.4 °C) (07/08/18 1146)  Pulse: 81 (07/08/18 1200)  Resp: 18 (07/08/18 1146)  BP: (!) 174/81 (07/08/18 1146)  SpO2: 98 % (07/08/18 1236)  O2 Device (Oxygen Therapy): nasal cannula (07/08/18 1236) Vital Signs  (24h Range):  Temp:  [96.7 °F (35.9 °C)-98.6 °F (37 °C)] 97.6 °F (36.4 °C)  Pulse:  [65-88] 81  Resp:  [18-22] 18  SpO2:  [87 %-98 %] 98 %  BP: (135-174)/(67-81) 174/81     Weight: 95.4 kg (210 lb 5.1 oz) (07/08/18 0600)  Body mass index is 31.06 kg/m².  Body surface area is 2.16 meters squared.    I/O last 3 completed shifts:  In: 675 [P.O.:675]  Out: 2930 [Urine:2930]    Physical Exam   Constitutional: He is oriented to person, place, and time. He appears well-developed and well-nourished. No distress.   HENT:   Head: Normocephalic and atraumatic.   Eyes: EOM are normal. No scleral icterus.   Cardiovascular: Normal rate and regular rhythm.  Exam reveals no friction rub.    No murmur heard.  Pulmonary/Chest: Effort normal. No respiratory distress. He has no wheezes. He has rales.   O2 NC   Abdominal: Soft. There is no tenderness.   Musculoskeletal: He exhibits no edema or tenderness.   Neurological: He is alert and oriented to person, place, and time.   No asterixis   Skin: Skin is warm and dry. No rash noted. No erythema.   Psychiatric: He has a normal mood and affect. His behavior is normal. Thought content normal.   Nursing note and vitals reviewed.      Significant Labs:  CBC:   Recent Labs  Lab 07/08/18  0442   WBC 6.29   RBC 2.79*   HGB 7.9*   HCT 25.4*      MCV 91   MCH 28.3   MCHC 31.1*     CMP:   Recent Labs  Lab 07/08/18  0442   *   CALCIUM 8.4*   ALBUMIN 3.0*   PROT 6.7      K 4.2   CO2 26      BUN 72*   CREATININE 4.0*   ALKPHOS 131   ALT 12   AST 13   BILITOT 0.6       Recent Labs  Lab 07/07/18  1724   COLORU Yellow   SPECGRAV 1.020   PHUR 6.0   PROTEINUA 1+*   BACTERIA None   NITRITE Negative   LEUKOCYTESUR Negative   UROBILINOGEN 1.0   HYALINECASTS 0     All labs within the past 24 hours have been reviewed.    Significant Imaging:  CXR:   Ill-defined perihilar and bibasilar lung opacities concerning for vascular congestion and edema similar to prior.  There is poor  definition of the lung bases concerning for superimposed effusions greater on the left.  There is no large pneumothorax.  Clinical correlation and continued follow-up advised   Impression            Echo: 7/5/18    1 - Normal left ventricular systolic function (EF 55-60%).     2 - No wall motion abnormalities.     3 - Mild left atrial enlargement.     4 - Normal right ventricular systolic function .       Assessment/Plan:     Active Diagnoses:    Diagnosis Date Noted POA    PRINCIPAL PROBLEM:  Acute on chronic systolic heart failure [I50.23] 11/15/2016 Yes    Acute renal failure superimposed on chronic kidney disease [N17.9, N18.9]  Unknown    Hyperglycemia [R73.9]  Unknown    Hypervolemia [E87.70]  Unknown    Shortness of breath [R06.02]  Unknown    Acute pulmonary edema [J81.0] 07/05/2018 Yes    Left-sided cerebrovascular accident (CVA) [I63.9] 11/18/2016 Yes    Essential hypertension [I10] 11/15/2016 Yes    Type 2 diabetes mellitus with hyperglycemia, with long-term current use of insulin [E11.65, Z79.4] 11/15/2016 Not Applicable    Hyperlipidemia [E78.5] 11/15/2016 Yes     Chronic    JANEL (acute kidney injury) [N17.9] 11/15/2016 Yes    CKD stage 3 due to type 2 diabetes mellitus [E11.22, N18.3] 11/15/2016 Yes      Problems Resolved During this Admission:    Diagnosis Date Noted Date Resolved POA       ARF on CKD4 vs progression to CKD5  - follows with Dr Schmitz as an outpatient  - last Cr in our system was 2.6-3.7 on 4/2017  - suspect progression of CKD, will refer to Dr Chau for dialysis access planning  - UOP adequate although O2 requirements persist, increase to PO Lasix 80mg TID with Metolazone 10mg PO QDay  - renal function is stable and stable from renal standpoint for discharge  - will need f/u with Dr Schmitz in 1-2 weeks with labs prior, orders placed  - vein mapping done 1/2017 may need to be updated if plans for HD  - renally dose medications for GFR <15  - avoid nephrotoxic agents  including NSAIDs  - hold Lisinopril on discharge     Anemia of chronic renal failure  - check iron studies on discharge     Hypervolemia, Acute on chronic systolic heart failure  - stable  - increased to PO Lasix 80 TID, metolazone 10 qday     Hyperphosphatemia  - low phos diet, monitor labs     DM  PVD  H/o LV thrombus  Anxiety/depression     Thank you for allowing me to participate in the care of your patient.  Please call with any questions.     Venita Cohen MD   Nephrology  [facility]  Office [phone]   [facility] covering for:  [facility]  MATT Schmitz MD, FACCASEY ROBERTSON MD,   MD HEIDI Lira, NP  [address]  [place], [address] [phone]

## 2018-07-08 NOTE — PROGRESS NOTES
Progress Note   [facility]       SUBJECTIVE:     Patient was examined at bedside this AM/ Patient states that he is tolerating PO intake, passing flatus and has adequate uop. Patient states that he becomes SOB when he ambulates. Denies any HA, fever, chills, n/v, chest pain, abdominal pain.       Review of patient's allergies indicates:  No Known Allergies    Past Medical History:   Diagnosis Date    CHF (congestive heart failure)     Diabetes mellitus     Hypertension     PVD (peripheral vascular disease)     Stroke     Lt pontine stroke 11/15/2016     Past Surgical History:   Procedure Laterality Date    TOE AMPUTATION Left     5th toe     Family History   Problem Relation Age of Onset    Stroke Mother     Diabetes Father      Social History   Substance Use Topics    Smoking status: Never Smoker    Smokeless tobacco: Not on file    Alcohol use No       Review of Systems:  Review of Systems   Constitutional: Negative.    Respiratory: Negative.         Except when ambulatory, patient is SOB.    Cardiovascular: Negative.    Gastrointestinal: Negative.    Genitourinary: Negative.    Skin: Negative.    Psychiatric/Behavioral: Negative for depression and suicidal ideas.       OBJECTIVE:     Vital Signs:  Temp:  [96.7 °F (35.9 °C)-98 °F (36.7 °C)]   Pulse:  [65-81]   Resp:  [20-22]   BP: (136-159)/(67-79)   SpO2:  [94 %-96 %]     Physical Exam:  Physical Exam   Constitutional: He is oriented to person, place, and time. He appears well-developed and well-nourished.   Cardiovascular: Normal rate, regular rhythm and normal heart sounds.    Pulmonary/Chest: He has wheezes. He has rales.   Crackles noted in the lower lobes b/l    Abdominal: Soft. Bowel sounds are normal. He exhibits no distension. There is no tenderness. There is no guarding.   Neurological: He is alert and oriented to person, place, and time.   Skin: Skin is warm. Capillary refill takes less than 2 seconds. No rash noted. No erythema. No  pallor.   Psychiatric: He has a normal mood and affect.       Laboratory:  CBC:   Recent Labs  Lab 07/07/18  0404   WBC 5.82   RBC 2.77*   HGB 8.0*   HCT 25.4*      MCV 92   MCH 28.9   MCHC 31.5*     BMP:   Recent Labs  Lab 07/07/18  0404   *      K 4.7      CO2 27   BUN 78*   CREATININE 4.2*   CALCIUM 8.6*   MG 2.6     CMP:   Recent Labs  Lab 07/07/18  0404   *   CALCIUM 8.6*   ALBUMIN 3.1*   PROT 7.0      K 4.7   CO2 27      BUN 78*   CREATININE 4.2*   ALKPHOS 133   ALT 13   AST 13   BILITOT 0.6     LFTs:   Recent Labs  Lab 07/07/18  0404   ALT 13   AST 13   ALKPHOS 133   BILITOT 0.6   PROT 7.0   ALBUMIN 3.1*     Coagulation:   Recent Labs  Lab 07/05/18  0940   LABPROT 11.8   INR 1.1     Cardiac markers:   Recent Labs  Lab 07/05/18  0940   TROPONINI <0.006     Imaging Results          X-Ray Chest AP Portable (Final result)  Result time 07/05/18 09:51:32    Final result by Nav Tiwari DO (07/05/18 09:51:32)                 Impression:      Please see above      Electronically signed by: Nav Tiwari DO  Date:    07/05/2018  Time:    09:51             Narrative:    EXAMINATION:  XR CHEST AP PORTABLE    CLINICAL HISTORY:  Shortness of breath;    TECHNIQUE:  Single frontal view of the chest was performed.    COMPARISON:  01/05/2017    FINDINGS:  Ill-defined perihilar and bibasilar lung opacities concerning for vascular congestion and edema similar to prior.  There is poor definition of the lung bases concerning for superimposed effusions greater on the left.  There is no large pneumothorax.  Clinical correlation and continued follow-up advised                                ASSESSMENT/PLAN:     68 y/o male with a pmhx of HF with perserved ejection fraction, L pontine infarct, HTN, CAD s/p MI, DM2, HLDand CKD4 with acute exacerbation of CHF. CXR shows bilateral effusions.  Patient with cardio-renal syndrome with increase in creatine from baseline of 2.7 to 4.2. Patient  has worsening kidney function with progression to CKD5 likely needing hemodialysis. Echo shows HF with preserved ejection fraction. Patient is not dyspneic at rest, but becomes SOB on exertion and will likely need home oxygen.     #Acute decompensated heart failure - patient with hf with preserved ejection fraction. Patient currently being under dieuresis with Lasix 80mg PO BID and Metolazone 5mg qday as per renal recs. Home oxygen.   #CKD4 with progression to CKD5 - Creatinine increased from baseline of 2.7 to 4.2 Patient being followed by Dr. grant as an outpatient in 1-2 weeks. Will be referred to Dr. Cleaning for dialysis access planning.   #Anemia of CKD - Patient with normocytic anemia. H/h stable. 8.6/27 mcv 94. Will continue to monitor.   #DMII - will continue to monitor glucose on Insulin   #HTN - hydralazine 50mg TID, In addition to Diuretics above  #Hyperlipidemia - rosuvastatin 40mg daily   #Depression - Citalopram 20mg Po daily    DVT ppx: SCD  GI PPx: none   Diet: Diabetic diet     Dispo: acute exacerbation of CHF requiring die uresis and airway management

## 2018-07-08 NOTE — PLAN OF CARE
Problem: Patient Care Overview  Goal: Plan of Care Review  Outcome: Ongoing (interventions implemented as appropriate)  Plan of care reviewed verbalized understanding.  Medications administer. Blood glucose monitored insulin administered per sliding scale. Cardiac monitoring NSR HR 70's. Bed in lowest position, call bell in reach, bed alarm on. Will continue to monitor.

## 2018-07-08 NOTE — NURSING
"Ambulated pt down the harman and in his room on room air. Pt SpO2  >92%. Pt stated, " I feel much better than I did when I was first admitted."   "

## 2018-07-09 VITALS
TEMPERATURE: 98 F | BODY MASS INDEX: 30.4 KG/M2 | OXYGEN SATURATION: 93 % | SYSTOLIC BLOOD PRESSURE: 142 MMHG | RESPIRATION RATE: 17 BRPM | HEIGHT: 69 IN | WEIGHT: 205.25 LBS | HEART RATE: 71 BPM | DIASTOLIC BLOOD PRESSURE: 67 MMHG

## 2018-07-09 LAB
ALBUMIN SERPL BCP-MCNC: 3.1 G/DL
ALP SERPL-CCNC: 122 U/L
ALT SERPL W/O P-5'-P-CCNC: 12 U/L
ANION GAP SERPL CALC-SCNC: 10 MMOL/L
AST SERPL-CCNC: 13 U/L
BASOPHILS # BLD AUTO: 0.01 K/UL
BASOPHILS NFR BLD: 0.2 %
BILIRUB SERPL-MCNC: 0.5 MG/DL
BUN SERPL-MCNC: 68 MG/DL
CALCIUM SERPL-MCNC: 8.6 MG/DL
CHLORIDE SERPL-SCNC: 100 MMOL/L
CO2 SERPL-SCNC: 29 MMOL/L
CREAT SERPL-MCNC: 3.9 MG/DL
DIFFERENTIAL METHOD: ABNORMAL
EOSINOPHIL # BLD AUTO: 0.2 K/UL
EOSINOPHIL NFR BLD: 3.2 %
ERYTHROCYTE [DISTWIDTH] IN BLOOD BY AUTOMATED COUNT: 15 %
EST. GFR  (AFRICAN AMERICAN): 18 ML/MIN/1.73 M^2
EST. GFR  (NON AFRICAN AMERICAN): 15 ML/MIN/1.73 M^2
GLUCOSE SERPL-MCNC: 136 MG/DL
HCT VFR BLD AUTO: 25.6 %
HGB BLD-MCNC: 8.1 G/DL
LYMPHOCYTES # BLD AUTO: 0.3 K/UL
LYMPHOCYTES NFR BLD: 5.3 %
MAGNESIUM SERPL-MCNC: 2.5 MG/DL
MCH RBC QN AUTO: 28.4 PG
MCHC RBC AUTO-ENTMCNC: 31.6 G/DL
MCV RBC AUTO: 90 FL
MONOCYTES # BLD AUTO: 0.7 K/UL
MONOCYTES NFR BLD: 10.5 %
NEUTROPHILS # BLD AUTO: 5.1 K/UL
NEUTROPHILS NFR BLD: 80.6 %
PHOSPHATE SERPL-MCNC: 5.4 MG/DL
PLATELET # BLD AUTO: 229 K/UL
PMV BLD AUTO: 9 FL
POCT GLUCOSE: 154 MG/DL (ref 70–110)
POCT GLUCOSE: 239 MG/DL (ref 70–110)
POTASSIUM SERPL-SCNC: 3.8 MMOL/L
PROT SERPL-MCNC: 6.9 G/DL
RBC # BLD AUTO: 2.85 M/UL
SODIUM SERPL-SCNC: 139 MMOL/L
WBC # BLD AUTO: 6.27 K/UL

## 2018-07-09 PROCEDURE — 25000003 PHARM REV CODE 250: Performed by: STUDENT IN AN ORGANIZED HEALTH CARE EDUCATION/TRAINING PROGRAM

## 2018-07-09 PROCEDURE — 94799 UNLISTED PULMONARY SVC/PX: CPT

## 2018-07-09 PROCEDURE — 80053 COMPREHEN METABOLIC PANEL: CPT

## 2018-07-09 PROCEDURE — 83735 ASSAY OF MAGNESIUM: CPT

## 2018-07-09 PROCEDURE — 94761 N-INVAS EAR/PLS OXIMETRY MLT: CPT

## 2018-07-09 PROCEDURE — 85025 COMPLETE CBC W/AUTO DIFF WBC: CPT

## 2018-07-09 PROCEDURE — 84100 ASSAY OF PHOSPHORUS: CPT

## 2018-07-09 PROCEDURE — 36415 COLL VENOUS BLD VENIPUNCTURE: CPT

## 2018-07-09 PROCEDURE — 25000003 PHARM REV CODE 250: Performed by: INTERNAL MEDICINE

## 2018-07-09 PROCEDURE — 27000221 HC OXYGEN, UP TO 24 HOURS

## 2018-07-09 RX ORDER — ASPIRIN 81 MG/1
81 TABLET ORAL DAILY
Refills: 0 | COMMUNITY
Start: 2018-07-10 | End: 2019-07-10

## 2018-07-09 RX ORDER — AMLODIPINE BESYLATE 10 MG/1
10 TABLET ORAL NIGHTLY
Qty: 90 TABLET | Refills: 3 | Status: SHIPPED | OUTPATIENT
Start: 2018-07-09 | End: 2019-07-09

## 2018-07-09 RX ORDER — TRAZODONE HYDROCHLORIDE 50 MG/1
50 TABLET ORAL NIGHTLY
Qty: 30 TABLET | Refills: 3 | Status: SHIPPED | OUTPATIENT
Start: 2018-07-09 | End: 2019-07-09

## 2018-07-09 RX ORDER — METOLAZONE 10 MG/1
10 TABLET ORAL DAILY
Qty: 30 TABLET | Refills: 11 | Status: SHIPPED | OUTPATIENT
Start: 2018-07-09 | End: 2019-07-09

## 2018-07-09 RX ORDER — CARVEDILOL 12.5 MG/1
12.5 TABLET ORAL 2 TIMES DAILY
Qty: 180 TABLET | Refills: 3 | Status: SHIPPED | OUTPATIENT
Start: 2018-07-09 | End: 2019-07-09

## 2018-07-09 RX ADMIN — INSULIN ASPART 4 UNITS: 100 INJECTION, SOLUTION INTRAVENOUS; SUBCUTANEOUS at 01:07

## 2018-07-09 RX ADMIN — ROSUVASTATIN CALCIUM 40 MG: 10 TABLET, FILM COATED ORAL at 09:07

## 2018-07-09 RX ADMIN — INSULIN DETEMIR 5 UNITS: 100 INJECTION, SOLUTION SUBCUTANEOUS at 09:07

## 2018-07-09 RX ADMIN — Medication 1 CAPSULE: at 09:07

## 2018-07-09 RX ADMIN — METOLAZONE 10 MG: 5 TABLET ORAL at 09:07

## 2018-07-09 RX ADMIN — CITALOPRAM HYDROBROMIDE 20 MG: 20 TABLET ORAL at 09:07

## 2018-07-09 RX ADMIN — FUROSEMIDE 80 MG: 40 TABLET ORAL at 09:07

## 2018-07-09 RX ADMIN — HYDRALAZINE HYDROCHLORIDE 50 MG: 25 TABLET, FILM COATED ORAL at 09:07

## 2018-07-09 RX ADMIN — FERROUS SULFATE TAB EC 325 MG (65 MG FE EQUIVALENT) 325 MG: 325 (65 FE) TABLET DELAYED RESPONSE at 09:07

## 2018-07-09 RX ADMIN — CARVEDILOL 12.5 MG: 12.5 TABLET, FILM COATED ORAL at 09:07

## 2018-07-09 RX ADMIN — ASPIRIN 81 MG: 81 TABLET, COATED ORAL at 09:07

## 2018-07-09 NOTE — PROGRESS NOTES
.Pharmacy New Medication Education    Patient accepted medication education.    Pharmacy educated patient on name and purpose of medications and possible side effects, using the teach-back method.     Current Inpatient Medication Orders   aspirin EC tablet 81 mg   carvedilol tablet 12.5 mg   citalopram tablet 20 mg   dextrose 50% injection 12.5 g   dextrose 50% injection 25 g   ferrous sulfate EC tablet 325 mg   furosemide tablet 80 mg   glucagon (human recombinant) injection 1 mg   glucose chewable tablet 16 g   glucose chewable tablet 24 g   hydrALAZINE tablet 50 mg   insulin aspart U-100 pen 1-10 Units   insulin detemir U-100 pen 5 Units   metOLazone tablet 10 mg   ondansetron injection 4 mg   pneumoc 13-eugenio conj-dip cr(PF) 0.5 mL   ramelteon tablet 8 mg   rosuvastatin tablet 40 mg   sodium chloride 0.9% flush 5 mL   traZODone tablet 50 mg   vitamin renal formula (B-complex-vitamin c-folic acid) 1 mg per capsule 1 capsule       Learners of pharmacy medication education included:  Patient    Patient +/- learner response:  Verbalized Understanding, Teachback

## 2018-07-09 NOTE — PROGRESS NOTES
Progress Note  [facility]  Admit Date: 7/5/2018   LOS: 4 days   SUBJECTIVE:   Follow-up For: CHF exacerbation    Patient seen and examined this AM. States that he is feeling much improved since admission and feels that he is ready to go home today. Explained to her that he will likely need to set up dialysis as an outpatient.     ROS   +SOB    OBJECTIVE:   Vital Signs (Most Recent)  Temp: 98.3 °F (36.8 °C) (07/09/18 0823)  Pulse: 78 (07/09/18 0823)  Resp: 20 (07/09/18 0823)  BP: (!) 147/73 (07/09/18 0823)  SpO2: 98 % (07/09/18 0809)    I & O (Last 24H):    Intake/Output Summary (Last 24 hours) at 07/09/18 0956  Last data filed at 07/09/18 0600   Gross per 24 hour   Intake              680 ml   Output             1975 ml   Net            -1295 ml     Wt Readings from Last 3 Encounters:   07/09/18 93.1 kg (205 lb 4 oz)   01/31/17 84.9 kg (187 lb 2.7 oz)   01/26/17 86.6 kg (191 lb)       Current Diet Order   Procedures    Diet diabetic [facility]; 2000 Calorie; Renal; Fluid - 1500mL     Low sodium     Order Specific Question:   Indicate patient location for additional diet options:     Answer:   [facility]     Order Specific Question:   Total calories:     Answer:   2000 Calorie     Order Specific Question:   Additional Diet Options:     Answer:   Renal     Order Specific Question:   Fluid restriction:     Answer:   Fluid - 1500mL        Physical Exam   Constitutional: He is oriented to person, place, and time and well-developed, well-nourished, and in no distress.   HENT:   Head: Normocephalic and atraumatic.   Eyes: EOM are normal.   Neck: Normal range of motion.   Cardiovascular: Normal rate and regular rhythm.    Pulmonary/Chest: Effort normal. No respiratory distress. He has decreased breath sounds.   Abdominal: Soft. Bowel sounds are normal. He exhibits no distension. There is no tenderness.   Musculoskeletal: Normal range of motion. He exhibits no edema or tenderness.   2+ edema lower  etremities   Neurological: He is alert and oriented to person, place, and time.   Skin: Skin is warm and dry.         Laboratory Data:  CBC    Recent Labs  Lab 07/07/18  0404 07/08/18  0442 07/09/18  0516   WBC 5.82 6.29 6.27   RBC 2.77* 2.79* 2.85*   HGB 8.0* 7.9* 8.1*   HCT 25.4* 25.4* 25.6*    224 229   MCV 92 91 90   MCH 28.9 28.3 28.4   MCHC 31.5* 31.1* 31.6*     CMP    Recent Labs  Lab 07/07/18  0404 07/08/18  0442 07/08/18  1616 07/09/18  0516   CALCIUM 8.6* 8.4* 8.7 8.6*   PROT 7.0 6.7  --  6.9    139 138 139   K 4.7 4.2 4.2 3.8   CO2 27 26 26 29    103 101 100   BUN 78* 72* 69* 68*   CREATININE 4.2* 4.0* 3.9* 3.9*   ALKPHOS 133 131  --  122   ALT 13 12  --  12   AST 13 13  --  13   BILITOT 0.6 0.6  --  0.5     POCT-Glucose  POCT Glucose   Date Value Ref Range Status   07/09/2018 154 (H) 70 - 110 mg/dL Final   07/08/2018 180 (H) 70 - 110 mg/dL Final   07/08/2018 199 (H) 70 - 110 mg/dL Final   07/08/2018 258 (H) 70 - 110 mg/dL Final   07/08/2018 156 (H) 70 - 110 mg/dL Final   07/07/2018 282 (H) 70 - 110 mg/dL Final   07/07/2018 248 (H) 70 - 110 mg/dL Final   07/07/2018 287 (H) 70 - 110 mg/dL Final   07/07/2018 170 (H) 70 - 110 mg/dL Final   07/06/2018 244 (H) 70 - 110 mg/dL Final   07/06/2018 236 (H) 70 - 110 mg/dL Final   07/06/2018 211 (H) 70 - 110 mg/dL Final     COAGS    Recent Labs  Lab 07/05/18  0940   INR 1.1     UA  No results for input(s): COLORU, CLARITYU, SPECGRAV, PHUR, PROTEINUA, GLUCOSEU, BLOODU, WBCU, RBCU, BACTERIA, MUCUS in the last 24 hours.    Invalid input(s):  BILIRUBINCON  MICRO  Microbiology Results (last 7 days)     ** No results found for the last 168 hours. **        LIPID PANEL  Lab Results   Component Value Date    CHOL 63 (L) 07/06/2018     Lab Results   Component Value Date    HDL 19 (L) 07/06/2018     Lab Results   Component Value Date    LDLCALC 29.2 (L) 07/06/2018     Lab Results   Component Value Date    TRIG 74 07/06/2018     Lab Results   Component Value  Date    CHOLHDL 30.2 07/06/2018       Diagnostic Results:  Imaging in last 24 hours: reviewed    ASSESSMENT/PLAN:   Ambrocio Chin is a 67 y.o. male    Assessment/Plan    Acute Exacerbation of HFrEF  - SOB and generalized fatigue x 1 week  - question of medication compliance   - 3+ pitting edema up to mid thighs  - CXR in ED: cardiomegaly and pulmonary vascular congestion along with pulmonary edema and pleural effusions consistent w/ CHF  - BNP 1917 on admission  - troponin negative  - Echo 1/2017 with EF of 65%  - Hold home BB and CCB in setting acute HFrEF exacerbation.   - Hold home lisinopril in setting of JANEL  - Lasix 80 mg IV in ED  - start on lasix 80 IV BID  -transitioned to oral  - daily weights  - strict I/O     JANEL on CKD5  - Likely secondary to cardio-renal syndrome due to volume overload secondary to HFrEF  - On admission, Cr 4.1  - baseline Cr appears to be ~2.7  - Hold home lisinopril in setting of JANEL  - diuresed with 80 lasix IV BID  - will continue aggressive diuresis in an effort to improve kidney function  -Cr down to 3.9 this am    Hyperglycemia  -initial glucose of 514 in ED  -given 10 units regular insulin with glucose down to 200s  -continue to monitor  -no signs of DKA- no anion gap, bicarb wnl  -stable     Hyperkalemia  - On admission, K 5.1  - No EKG changes, no peaked T-waves, no ST-changes  - received 10 units regular insulin in ED for elevated blood glucose  - lasix for diuresis.   -stable this am     HTN  Stable this am     Normocytic Anemia  - stable  - Likely anemia of chronic disease  - Continue home ferrous sulfate/colace     DM2  - ISS  - Detemir 10 units BID     HLD  - lipid panel ordered  - Continue home rosuvastatin    H/O LV Thrombus  - 11/17/16 TTE: small round apical thrombus attached to the lateral part of the apex.  - Patient previously on Warfarin , was d/c'ed after last hospitalization   -patient currently only on ASA  -will resume     Anxiety/Depression  - Stable,  denies SI/HI  - Continue home citalopram and trazodone qHS     DVT PPx: SCDs   Diet: Renal  Code: Full     Dispo: F/u likely discharge today       7/9/2018 John Wadsworth MD  12:25 PM

## 2018-07-09 NOTE — PLAN OF CARE
Follow-up With  Details  Why  Contact Info   Hemal Eaton MD  Go on 7/12/2018  @ 11am  [address]  [place] [id number]  [phone]   Luis Alberto Schmitz MD  Go on 7/18/2018  @ 2:30pm  [address]  [address]  [facility]  [place] [address]  [phone]   Braxton Chau MD  Go on 7/16/2018  @ 8:20am, For access planning per nephrology  [address]  [address]  [place] [address]  [phone]          Patient has friend coming to bring him home, [place] has made his appointments for him as indicated above. Patient has opted for his pharmacy of choice for DC meds.       07/09/18 1256   Final Note   Assessment Type Final Discharge Note   Discharge Disposition Home   What phone number can be called within the next 1-3 days to see how you are doing after discharge? [phone]   Hospital Follow Up  Appt(s) scheduled? Yes   Discharge plans and expectations educations in teach back method with documentation complete? Yes   Right Care Referral Info   Post Acute Recommendation No Care

## 2018-07-09 NOTE — PLAN OF CARE
Date Performed: 2018     1)         Patient's Home O2 Sat on room air, while at rest: 95%                               If O2 sats on room air at rest are 88% or below, patient qualifies. No additional testing needed. Document N/A in steps 2 and 3. If 89% or above, complete steps 2.        2)         Patient's O2 Sat on room air while exercisin%                                             If O2 sats on room air while exercising remain 89% or above patient does not qualify, no further testing needed Document N/A in step 3. If O2 sats on room air while exercising are 88% or below, continue to step 3.        3)         Patient's O2 Sat while exercising on O2: 92 at 2L LPM                                           (Must show improvement from #2 for patients to qualify)     If O2 sats improve on oxygen, patient qualifies for portable oxygen. If not, the patient does not qualify.

## 2018-07-09 NOTE — PLAN OF CARE
Problem: Patient Care Overview  Goal: Plan of Care Review  Outcome: Revised  Plan of care reviewed with patient. Patient verbalized complete understanding. Fall/safety precautions maintained. Slip resistant socks on. Bed in lowest position, locked, call light within reach. Side rails up x's 2. Nurse instructed patient to notify staff for any assistance and pt verbalized complete understanding. Pt sleeping throughout shift.  utilized for medication administration, pt verbalized understanding. Blood glucose monitored throughout shift. Lasix administered as scheduled. No acute distress noted, will continue to monitor. Pt on telemetry, no ectopy or true red alarms noted, SR, HR 60-70's

## 2018-07-09 NOTE — PLAN OF CARE
Discharge instructions provided, voices understanding. Telemetry discontinued and returned to monitor tech. Peripheral line discontinued, tip intact, pressure applied. Discharged on stable conditions, all belongings with patient.

## 2018-07-09 NOTE — PLAN OF CARE
Care plan reviewed with patient, AAOx4, no respiratory distress noted, on room air. NSR per cardiac monitor, no red alarm noted. Denies any pain of discomfort, education provided on medication effect and side effect, including insulin administration for hyperglycemia, voices understanding. Call light within his reach, no apparent distress noted, bed in low position, bed alarm on, educated on the importance of calling as needed, voices understanding, stable at this time.

## 2018-07-09 NOTE — PLAN OF CARE
07/09/18 1125   Medicare Message   Important Message from Medicare regarding Discharge Appeal Rights Given to patient/caregiver;Explained to patient/caregiver;Signed/date by patient/caregiver   Date IMM was signed 07/09/18   Time IMM was signed 1122

## 2018-07-10 ENCOUNTER — PATIENT OUTREACH (OUTPATIENT)
Dept: ADMINISTRATIVE | Facility: CLINIC | Age: 68
End: 2018-07-10

## 2018-07-10 NOTE — PATIENT INSTRUCTIONS

## 2018-07-26 NOTE — DISCHARGE SUMMARY
[facility]  Discharge Summary      Admit Date: 7/5/2018    Discharge Date and Time: 7/9/2018  2:21 PM    Attending Physician: Felipe Rivers MD    Reason for Admission: CHF exacerbation    Procedures Performed: * No surgery found *     History of Present Illness:   66yo Bulgarian speaking M w/ PMHx significant for HFrEF (EF 65% 1/2017), L pontine infarct on 11/15/16, HTN, CAD s/p MI, DM2, HLD, CKD4, diabetic retinopathy who presents to ED with complaint of SOB and generalized fatigue x1 week. At baseline, he can ambulate across his house with out SOB and fatigue, however, currently can only take a few steps before becoming SOB. States he sleeps with 2 pillows however admits to have to sleep sitting up and feels short of breath when sleepign laying down. Endorses swollen legs and abdomen over the last couple of days but does not know specifically how much weight he has gained. He denies any chest pains or cough,  fever, chills, chest pain, cough, abdominal pain, dizziness, lightheadedness.     Hospital Course by problem:     Acute Exacerbation of HFrEF  - SOB and generalized fatigue x 1 week  - question of medication compliance   - 3+ pitting edema up to mid thighs  - CXR in ED: cardiomegaly and pulmonary vascular congestion along with pulmonary edema and pleural effusions consistent w/ CHF  - BNP 1917 on admission  - troponin negative  - Echo 1/2017 with EF of 65%  - Held home BB and CCB in setting acute HFrEF exacerbation.   - Held home lisinopril in setting of JANEL  - Lasix 80 mg IV in ED  - start on lasix 80 IV BID  - daily weights  - strict I/O   -transitioned to oral lasix during hospital stay     JANEL on CKD5  - Likely secondary to cardio-renal syndrome due to volume overload secondary to HFrEF  - On admission, Cr 4.1  - baseline Cr appears to be ~2.7  - Hold home lisinopril in setting of JANEL  - diuresed with 80 lasix IV BID  - continue aggressive diuresis in an effort to improve kidney  function  -Cr down to 3.9 on day of discharge  - will likely need outpatient dialysis set up on discharge.      Hyperglycemia  -initial glucose of 514 in ED  -given 10 units regular insulin with glucose down to 200s  -no signs of DKA- no anion gap, bicarb wnl     Hyperkalemia  - On admission, K 5.1  - No EKG changes, no peaked T-waves, no ST-changes  - received 10 units regular insulin in ED for elevated blood glucose     HTN  Stable during admission on home regimen     Normocytic Anemia  - stable  - Likely anemia of chronic disease  - Continue home ferrous sulfate/colace     DM2  - ISS  - Detemir 10 units BID     HLD  - Continue home rosuvastatin     H/O LV Thrombus  - 11/17/16 TTE: small round apical thrombus attached to the lateral part of the apex.  - Patient previously on Warfarin , was d/c'ed after last hospitalization   -patient resumed on ASA     Anxiety/Depression  - Stable, denies SI/HI  - Continue home citalopram and trazodone qHS    Patient discharged in stable condition with follow ups with PCP, his outpatient nephrologist, Dr. Schmitz, as well as an appointment with general surgery for placement of dialysis access.     Consults: nephrology    Significant Diagnostic Studies: Labs:   CMP No results for input(s): NA, K, CL, CO2, GLU, BUN, CREATININE, CALCIUM, PROT, ALBUMIN, BILITOT, ALKPHOS, AST, ALT, ANIONGAP, ESTGFRAFRICA, EGFRNONAA in the last 48 hours., CBC No results for input(s): WBC, HGB, HCT, PLT in the last 48 hours., INR   Lab Results   Component Value Date    INR 1.1 07/05/2018    INR 1.1 01/07/2017    INR 1.1 01/06/2017   , Lipid Panel   Lab Results   Component Value Date    CHOL 63 (L) 07/06/2018    HDL 19 (L) 07/06/2018    LDLCALC 29.2 (L) 07/06/2018    TRIG 74 07/06/2018    CHOLHDL 30.2 07/06/2018   , Troponin No results for input(s): TROPONINI in the last 168 hours. and A1C:   Recent Labs  Lab 07/05/18  1526   HGBA1C 8.3*     Radiology: X-Ray: CXR: X-Ray Chest 1 View (CXR):   Results for  orders placed or performed during the hospital encounter of 07/05/18   X-Ray Chest 1 View    Narrative    EXAMINATION:  XR CHEST 1 VIEW    CLINICAL HISTORY:  sob; Acute pulmonary edema    TECHNIQUE:  Single frontal view of the chest was performed.    COMPARISON:  07/05/2018    FINDINGS:  Lungs are symmetrically inflated.  There is some interval improvement in aeration of the right lower lobe.  Layering pleural effusion likely persists however.  No significant interval change in pleural fluid volume with likely compressive atelectasis affecting the left lower lobe.  No pneumothorax or focal airspace consolidation.  The cardiomediastinal silhouette, osseous and soft tissue structures are stable for this patient.      Impression    Improving aeration of the right lower lobe.  Stable left pleural effusion with likely atelectasis.  No significant pulmonary edema identified      Electronically signed by: Stephany Lopez MD  Date:    07/08/2018  Time:    10:05     Cardiac Graphics: Echocardiogram:   2D echo with color flow doppler:   Results for orders placed or performed during the hospital encounter of 07/05/18   2D echo with color flow doppler   Result Value Ref Range    EF 55 55 - 65    Mitral Valve Regurgitation TRIVIAL     Aortic Valve Regurgitation TRIVIAL     Est. PA Systolic Pressure 15.25        Final Diagnoses:    Principal Problem: Acute on chronic systolic heart failure   Secondary Diagnoses:   Active Hospital Problems    Diagnosis  POA    *Acute on chronic systolic heart failure [I50.23]  Yes    Combined hyperlipidemia associated with type 2 diabetes mellitus [E11.69, E78.2]  Unknown    Hypertension associated with diabetes [E11.59, I10]  Unknown    Uncontrolled type 2 diabetes mellitus with hyperosmolarity without coma, with long-term current use of insulin [E11.00, Z79.4]  Not Applicable    Acute renal failure superimposed on chronic kidney disease [N17.9, N18.9]  Unknown    Hyperglycemia [R73.9]   Unknown    Hypervolemia [E87.70]  Unknown    Shortness of breath [R06.02]  Unknown    Acute pulmonary edema [J81.0]  Yes    Left-sided cerebrovascular accident (CVA) [I63.9]  Yes    Essential hypertension [I10]  Yes    Type 2 diabetes mellitus with hyperglycemia, with long-term current use of insulin [E11.65, Z79.4]  Not Applicable    Hyperlipidemia [E78.5]  Yes     Chronic    JANEL (acute kidney injury) [N17.9]  Yes    CKD stage 3 due to type 2 diabetes mellitus [E11.22, N18.3]  Yes      Resolved Hospital Problems    Diagnosis Date Resolved POA   No resolved problems to display.       Discharged Condition: stable    Disposition: Home or Self Care    Follow Up/Patient Instructions:     Medications:  Reconciled Home Medications:      Medication List      CHANGE how you take these medications    * aspirin 81 MG EC tablet  Commonly known as:  ECOTRIN  Take 1 tablet (81 mg total) by mouth once daily.  What changed:  Another medication with the same name was added. Make sure you understand how and when to take each.     * aspirin 81 MG EC tablet  Commonly known as:  ECOTRIN  Take 1 tablet (81 mg total) by mouth once daily.  What changed:  You were already taking a medication with the same name, and this prescription was added. Make sure you understand how and when to take each.     furosemide 80 MG tablet  Commonly known as:  LASIX  Take 1 tablet (80 mg total) by mouth 2 (two) times daily.  What changed:  when to take this        * This list has 2 medication(s) that are the same as other medications prescribed for you. Read the directions carefully, and ask your doctor or other care provider to review them with you.            CONTINUE taking these medications    amLODIPine 10 MG tablet  Commonly known as:  NORVASC  Take 1 tablet (10 mg total) by mouth every evening.     blood sugar diagnostic Strp  100 strips by Misc.(Non-Drug; Combo Route) route once daily.     carvedilol 12.5 MG tablet  Commonly known as:   COREG  Take 1 tablet (12.5 mg total) by mouth 2 (two) times daily.     ferrous sulfate 325 (65 FE) MG EC tablet  Take 1 tablet (325 mg total) by mouth once daily.     insulin glargine 100 unit/mL injection  Commonly known as:  LANTUS U-100 INSULIN  Inject 12 Units into the skin every evening.     metOLazone 10 MG tablet  Commonly known as:  ZAROXOLYN  Take 1 tablet (10 mg total) by mouth once daily.     nut.tx.imp.renal fxn,lac-reduc 0.04 gram-1.8 kcal/mL Liqd  1 Can with Meals Three Times a Day     rosuvastatin 20 MG tablet  Commonly known as:  CRESTOR  Take 2 tablets (40 mg total) by mouth once daily.     traZODone 50 MG tablet  Commonly known as:  DESYREL  Take 1 tablet (50 mg total) by mouth every evening.     vitamin renal formula (B-complex-vitamin c-folic acid) 1 mg Cap  Commonly known as:  NEPHROCAP  Take 1 capsule by mouth once daily.        STOP taking these medications    hydrALAZINE 50 MG tablet  Commonly known as:  APRESOLINE     lisinopril 40 MG tablet  Commonly known as:  PRINIVIL,ZESTRIL            Discharge Procedure Orders  BASIC METABOLIC PANEL   Standing Status: Future  Standing Exp. Date: 09/05/19     PTH, intact   Standing Status: Future  Standing Exp. Date: 09/05/19     Urinalysis   Standing Status: Future  Standing Exp. Date: 07/07/19     Protein / creatinine ratio, urine   Standing Status: Future  Standing Exp. Date: 07/07/19   Order Specific Question Answer Comments   Specimen Source Urine      CBC auto differential   Standing Status: Future  Standing Exp. Date: 09/05/19     Iron and TIBC   Standing Status: Future  Standing Exp. Date: 09/05/19     Ferritin   Standing Status: Future  Standing Exp. Date: 09/05/19     Phosphorus   Standing Status: Future  Standing Exp. Date: 09/05/19     Ambulatory Referral to General Surgery   Referral Priority: Routine Referral Type: Consultation   Referral Reason: Specialty Services Required    Referred to Provider: MAHNAZ GALVIN Specialty:  General Surgery   Number of Visits Requested: 1        Follow-up Information     Hemal Eaton MD. Go on 7/12/2018.    Specialty:  Family Medicine  Why:  @ 11am  Contact information:  [address] [place] [id number]  [phone]             Luis Alberto Schmitz MD. Go on 7/18/2018.    Specialty:  Nephrology  Why:  @ 2:30pm  Contact information:  [address]  [address]  [facility]  [place] [address]  [phone]             Braxton Chau MD. Go on 7/16/2018.    Specialties:  Surgery, General Surgery  Why:  @ 8:20am, For access planning per nephrology  Contact information:  [address]  [address]  [place] [address]  [phone]                 >30 minutes spent on direct patient care on day of discharge

## 2018-08-02 ENCOUNTER — CLINICAL SUPPORT (OUTPATIENT)
Dept: ELECTROPHYSIOLOGY | Facility: CLINIC | Age: 68
End: 2018-08-02
Attending: INTERNAL MEDICINE
Payer: MEDICARE

## 2018-08-02 DIAGNOSIS — Z95.818 STATUS POST PLACEMENT OF IMPLANTABLE LOOP RECORDER: ICD-10-CM

## 2018-08-02 DIAGNOSIS — I63.9 CRYPTOGENIC STROKE: ICD-10-CM

## 2018-08-10 ENCOUNTER — LAB VISIT (OUTPATIENT)
Dept: LAB | Facility: HOSPITAL | Age: 68
End: 2018-08-10
Attending: INTERNAL MEDICINE
Payer: MEDICARE

## 2018-08-10 DIAGNOSIS — N18.4 CKD (CHRONIC KIDNEY DISEASE) STAGE 4, GFR 15-29 ML/MIN: ICD-10-CM

## 2018-08-10 LAB
ALBUMIN SERPL BCP-MCNC: 3.1 G/DL
ANION GAP SERPL CALC-SCNC: 7 MMOL/L
BASOPHILS # BLD AUTO: 0.01 K/UL
BASOPHILS NFR BLD: 0.2 %
BUN SERPL-MCNC: 69 MG/DL
CALCIUM SERPL-MCNC: 8.6 MG/DL
CALCIUM SERPL-MCNC: 8.6 MG/DL
CHLORIDE SERPL-SCNC: 106 MMOL/L
CO2 SERPL-SCNC: 25 MMOL/L
CREAT SERPL-MCNC: 3.7 MG/DL
DIFFERENTIAL METHOD: ABNORMAL
EOSINOPHIL # BLD AUTO: 0.2 K/UL
EOSINOPHIL NFR BLD: 3.3 %
ERYTHROCYTE [DISTWIDTH] IN BLOOD BY AUTOMATED COUNT: 14.5 %
EST. GFR  (AFRICAN AMERICAN): 19 ML/MIN/1.73 M^2
EST. GFR  (NON AFRICAN AMERICAN): 16 ML/MIN/1.73 M^2
GLUCOSE SERPL-MCNC: 297 MG/DL
HCT VFR BLD AUTO: 26 %
HGB BLD-MCNC: 8.2 G/DL
LYMPHOCYTES # BLD AUTO: 0.3 K/UL
LYMPHOCYTES NFR BLD: 5.9 %
MCH RBC QN AUTO: 28 PG
MCHC RBC AUTO-ENTMCNC: 31.5 G/DL
MCV RBC AUTO: 89 FL
MONOCYTES # BLD AUTO: 0.4 K/UL
MONOCYTES NFR BLD: 7.6 %
NEUTROPHILS # BLD AUTO: 4.8 K/UL
NEUTROPHILS NFR BLD: 83 %
PHOSPHATE SERPL-MCNC: 3.4 MG/DL
PLATELET # BLD AUTO: 218 K/UL
PMV BLD AUTO: 9 FL
POTASSIUM SERPL-SCNC: 4.7 MMOL/L
RBC # BLD AUTO: 2.93 M/UL
SODIUM SERPL-SCNC: 138 MMOL/L
WBC # BLD AUTO: 5.78 K/UL

## 2018-08-10 PROCEDURE — 84100 ASSAY OF PHOSPHORUS: CPT

## 2018-08-10 PROCEDURE — 85025 COMPLETE CBC W/AUTO DIFF WBC: CPT

## 2018-08-10 PROCEDURE — 36415 COLL VENOUS BLD VENIPUNCTURE: CPT

## 2018-08-10 PROCEDURE — 80048 BASIC METABOLIC PNL TOTAL CA: CPT

## 2018-08-10 PROCEDURE — 82040 ASSAY OF SERUM ALBUMIN: CPT

## 2018-08-20 ENCOUNTER — LAB VISIT (OUTPATIENT)
Dept: LAB | Facility: HOSPITAL | Age: 68
End: 2018-08-20
Attending: INTERNAL MEDICINE
Payer: MEDICARE

## 2018-08-20 DIAGNOSIS — D63.1 ANEMIA IN STAGE 4 CHRONIC KIDNEY DISEASE: ICD-10-CM

## 2018-08-20 DIAGNOSIS — N25.81 SECONDARY HYPERPARATHYROIDISM OF RENAL ORIGIN: ICD-10-CM

## 2018-08-20 DIAGNOSIS — N18.5 CHRONIC KIDNEY DISEASE (CKD), STAGE V: ICD-10-CM

## 2018-08-20 DIAGNOSIS — N18.4 ANEMIA IN STAGE 4 CHRONIC KIDNEY DISEASE: ICD-10-CM

## 2018-08-20 LAB
25(OH)D3+25(OH)D2 SERPL-MCNC: 21 NG/ML
IRON SERPL-MCNC: 41 UG/DL
MAGNESIUM SERPL-MCNC: 2.4 MG/DL
PHOSPHATE SERPL-MCNC: 4.5 MG/DL
PTH-INTACT SERPL-MCNC: 330.5 PG/ML
PTH-INTACT SERPL-MCNC: 330.5 PG/ML
SATURATED IRON: 14 %
TOTAL IRON BINDING CAPACITY: 293 UG/DL
TRANSFERRIN SERPL-MCNC: 198 MG/DL
URATE SERPL-MCNC: 14.8 MG/DL

## 2018-08-20 PROCEDURE — 83735 ASSAY OF MAGNESIUM: CPT

## 2018-08-20 PROCEDURE — 84550 ASSAY OF BLOOD/URIC ACID: CPT

## 2018-08-20 PROCEDURE — 36415 COLL VENOUS BLD VENIPUNCTURE: CPT

## 2018-08-20 PROCEDURE — 83970 ASSAY OF PARATHORMONE: CPT

## 2018-08-20 PROCEDURE — 84100 ASSAY OF PHOSPHORUS: CPT

## 2018-08-20 PROCEDURE — 83540 ASSAY OF IRON: CPT

## 2018-08-20 PROCEDURE — 82306 VITAMIN D 25 HYDROXY: CPT

## 2018-08-22 ENCOUNTER — LAB VISIT (OUTPATIENT)
Dept: LAB | Facility: HOSPITAL | Age: 68
End: 2018-08-22
Attending: INTERNAL MEDICINE
Payer: MEDICARE

## 2018-08-22 DIAGNOSIS — E11.65 TYPE 2 DIABETES MELLITUS WITH HYPERGLYCEMIA, WITH LONG-TERM CURRENT USE OF INSULIN: ICD-10-CM

## 2018-08-22 DIAGNOSIS — N25.81 SECONDARY HYPERPARATHYROIDISM OF RENAL ORIGIN: ICD-10-CM

## 2018-08-22 DIAGNOSIS — N18.5 CHRONIC KIDNEY DISEASE (CKD), STAGE V: ICD-10-CM

## 2018-08-22 DIAGNOSIS — E87.5 HYPERKALEMIA: ICD-10-CM

## 2018-08-22 DIAGNOSIS — N18.5 ANEMIA IN STAGE 5 CHRONIC KIDNEY DISEASE, NOT ON CHRONIC DIALYSIS: ICD-10-CM

## 2018-08-22 DIAGNOSIS — I10 ESSENTIAL HYPERTENSION: ICD-10-CM

## 2018-08-22 DIAGNOSIS — D63.1 ANEMIA IN STAGE 5 CHRONIC KIDNEY DISEASE, NOT ON CHRONIC DIALYSIS: ICD-10-CM

## 2018-08-22 DIAGNOSIS — Z79.4 TYPE 2 DIABETES MELLITUS WITH HYPERGLYCEMIA, WITH LONG-TERM CURRENT USE OF INSULIN: ICD-10-CM

## 2018-08-22 LAB
ALBUMIN SERPL BCP-MCNC: 3.6 G/DL
ANION GAP SERPL CALC-SCNC: 9 MMOL/L
BASOPHILS # BLD AUTO: 0.02 K/UL
BASOPHILS NFR BLD: 0.4 %
BUN SERPL-MCNC: 74 MG/DL
CALCIUM SERPL-MCNC: 9 MG/DL
CALCIUM SERPL-MCNC: 9 MG/DL
CHLORIDE SERPL-SCNC: 99 MMOL/L
CO2 SERPL-SCNC: 29 MMOL/L
CREAT SERPL-MCNC: 3.9 MG/DL
DIFFERENTIAL METHOD: ABNORMAL
EOSINOPHIL # BLD AUTO: 0.2 K/UL
EOSINOPHIL NFR BLD: 4.8 %
ERYTHROCYTE [DISTWIDTH] IN BLOOD BY AUTOMATED COUNT: 14.7 %
EST. GFR  (AFRICAN AMERICAN): 18 ML/MIN/1.73 M^2
EST. GFR  (NON AFRICAN AMERICAN): 15 ML/MIN/1.73 M^2
GLUCOSE SERPL-MCNC: 156 MG/DL
HCT VFR BLD AUTO: 29.7 %
HGB BLD-MCNC: 9.7 G/DL
LYMPHOCYTES # BLD AUTO: 0.7 K/UL
LYMPHOCYTES NFR BLD: 13.9 %
MCH RBC QN AUTO: 28.5 PG
MCHC RBC AUTO-ENTMCNC: 32.7 G/DL
MCV RBC AUTO: 87 FL
MONOCYTES # BLD AUTO: 0.4 K/UL
MONOCYTES NFR BLD: 7.9 %
NEUTROPHILS # BLD AUTO: 3.7 K/UL
NEUTROPHILS NFR BLD: 72.8 %
PHOSPHATE SERPL-MCNC: 5.1 MG/DL
PLATELET # BLD AUTO: 264 K/UL
PMV BLD AUTO: 8.3 FL
POTASSIUM SERPL-SCNC: 4 MMOL/L
PTH-INTACT SERPL-MCNC: 424.9 PG/ML
RBC # BLD AUTO: 3.4 M/UL
SODIUM SERPL-SCNC: 137 MMOL/L
WBC # BLD AUTO: 5.04 K/UL

## 2018-08-22 PROCEDURE — 85025 COMPLETE CBC W/AUTO DIFF WBC: CPT

## 2018-08-22 PROCEDURE — 80048 BASIC METABOLIC PNL TOTAL CA: CPT

## 2018-08-22 PROCEDURE — 36415 COLL VENOUS BLD VENIPUNCTURE: CPT

## 2018-08-22 PROCEDURE — 84100 ASSAY OF PHOSPHORUS: CPT

## 2018-08-22 PROCEDURE — 82040 ASSAY OF SERUM ALBUMIN: CPT

## 2018-08-22 PROCEDURE — 83970 ASSAY OF PARATHORMONE: CPT

## 2018-10-01 ENCOUNTER — LAB VISIT (OUTPATIENT)
Dept: LAB | Facility: HOSPITAL | Age: 68
End: 2018-10-01
Attending: INTERNAL MEDICINE
Payer: MEDICARE

## 2018-10-01 DIAGNOSIS — N18.5 CHRONIC KIDNEY DISEASE (CKD), STAGE V: ICD-10-CM

## 2018-10-01 DIAGNOSIS — E87.5 HYPERKALEMIA: ICD-10-CM

## 2018-10-01 DIAGNOSIS — E11.22 TYPE 2 DIABETES MELLITUS WITH STAGE 4 CHRONIC KIDNEY DISEASE, WITH LONG-TERM CURRENT USE OF INSULIN: ICD-10-CM

## 2018-10-01 DIAGNOSIS — Z79.4 TYPE 2 DIABETES MELLITUS WITH STAGE 4 CHRONIC KIDNEY DISEASE, WITH LONG-TERM CURRENT USE OF INSULIN: ICD-10-CM

## 2018-10-01 DIAGNOSIS — I10 ESSENTIAL HYPERTENSION: ICD-10-CM

## 2018-10-01 DIAGNOSIS — N18.4 TYPE 2 DIABETES MELLITUS WITH STAGE 4 CHRONIC KIDNEY DISEASE, WITH LONG-TERM CURRENT USE OF INSULIN: ICD-10-CM

## 2018-10-01 DIAGNOSIS — N18.4 ANEMIA IN STAGE 4 CHRONIC KIDNEY DISEASE: ICD-10-CM

## 2018-10-01 DIAGNOSIS — D63.1 ANEMIA IN STAGE 4 CHRONIC KIDNEY DISEASE: ICD-10-CM

## 2018-10-01 LAB
ALBUMIN SERPL BCP-MCNC: 3.3 G/DL
ANION GAP SERPL CALC-SCNC: 13 MMOL/L
BASOPHILS # BLD AUTO: 0.01 K/UL
BASOPHILS NFR BLD: 0.2 %
BUN SERPL-MCNC: 92 MG/DL
CALCIUM SERPL-MCNC: 8.7 MG/DL
CALCIUM SERPL-MCNC: 8.7 MG/DL
CHLORIDE SERPL-SCNC: 95 MMOL/L
CO2 SERPL-SCNC: 27 MMOL/L
CREAT SERPL-MCNC: 4.4 MG/DL
DIFFERENTIAL METHOD: ABNORMAL
EOSINOPHIL # BLD AUTO: 0.2 K/UL
EOSINOPHIL NFR BLD: 3.3 %
ERYTHROCYTE [DISTWIDTH] IN BLOOD BY AUTOMATED COUNT: 14.4 %
EST. GFR  (AFRICAN AMERICAN): 15 ML/MIN/1.73 M^2
EST. GFR  (NON AFRICAN AMERICAN): 13 ML/MIN/1.73 M^2
FERRITIN SERPL-MCNC: 80 NG/ML
GLUCOSE SERPL-MCNC: 275 MG/DL
HCT VFR BLD AUTO: 29.9 %
HGB BLD-MCNC: 10 G/DL
LYMPHOCYTES # BLD AUTO: 0.3 K/UL
LYMPHOCYTES NFR BLD: 7 %
MCH RBC QN AUTO: 28.2 PG
MCHC RBC AUTO-ENTMCNC: 33.4 G/DL
MCV RBC AUTO: 84 FL
MONOCYTES # BLD AUTO: 0.5 K/UL
MONOCYTES NFR BLD: 10.5 %
NEUTROPHILS # BLD AUTO: 3.8 K/UL
NEUTROPHILS NFR BLD: 78.8 %
PLATELET # BLD AUTO: 231 K/UL
PMV BLD AUTO: 8.8 FL
POTASSIUM SERPL-SCNC: 3.7 MMOL/L
RBC # BLD AUTO: 3.55 M/UL
SODIUM SERPL-SCNC: 135 MMOL/L
WBC # BLD AUTO: 4.85 K/UL

## 2018-10-01 PROCEDURE — 82040 ASSAY OF SERUM ALBUMIN: CPT

## 2018-10-01 PROCEDURE — 82728 ASSAY OF FERRITIN: CPT

## 2018-10-01 PROCEDURE — 80048 BASIC METABOLIC PNL TOTAL CA: CPT

## 2018-10-01 PROCEDURE — 85025 COMPLETE CBC W/AUTO DIFF WBC: CPT

## 2018-10-01 PROCEDURE — 36415 COLL VENOUS BLD VENIPUNCTURE: CPT

## 2018-10-08 PROBLEM — J81.0 ACUTE PULMONARY EDEMA: Status: RESOLVED | Noted: 2018-07-05 | Resolved: 2018-10-08

## 2019-03-26 PROBLEM — N18.4 CHRONIC KIDNEY DISEASE (CKD), STAGE IV (SEVERE): Status: ACTIVE | Noted: 2019-03-26

## 2019-03-26 PROBLEM — D63.1 ANEMIA DUE TO STAGE 4 CHRONIC KIDNEY DISEASE: Status: ACTIVE | Noted: 2019-03-26

## 2019-03-26 PROBLEM — N18.5 ANEMIA IN STAGE 5 CHRONIC KIDNEY DISEASE, NOT ON CHRONIC DIALYSIS: Status: RESOLVED | Noted: 2018-08-22 | Resolved: 2019-03-26

## 2019-03-26 PROBLEM — N18.5 CHRONIC KIDNEY DISEASE (CKD), STAGE V: Status: RESOLVED | Noted: 2018-08-22 | Resolved: 2019-03-26

## 2019-03-26 PROBLEM — D63.1 ANEMIA IN STAGE 5 CHRONIC KIDNEY DISEASE, NOT ON CHRONIC DIALYSIS: Status: RESOLVED | Noted: 2018-08-22 | Resolved: 2019-03-26

## 2019-03-26 PROBLEM — E87.5 HYPERKALEMIA: Status: RESOLVED | Noted: 2017-01-03 | Resolved: 2019-03-26

## 2019-03-26 PROBLEM — N18.4 ANEMIA DUE TO STAGE 4 CHRONIC KIDNEY DISEASE: Status: ACTIVE | Noted: 2019-03-26

## 2019-04-10 ENCOUNTER — LAB VISIT (OUTPATIENT)
Dept: LAB | Facility: HOSPITAL | Age: 69
End: 2019-04-10
Attending: INTERNAL MEDICINE
Payer: MEDICARE

## 2019-04-10 DIAGNOSIS — E11.59 HYPERTENSION ASSOCIATED WITH DIABETES: ICD-10-CM

## 2019-04-10 DIAGNOSIS — N25.81 SECONDARY HYPERPARATHYROIDISM OF RENAL ORIGIN: ICD-10-CM

## 2019-04-10 DIAGNOSIS — Z79.4 TYPE 2 DIABETES MELLITUS WITH HYPERGLYCEMIA, WITH LONG-TERM CURRENT USE OF INSULIN: ICD-10-CM

## 2019-04-10 DIAGNOSIS — E87.5 HYPERKALEMIA: ICD-10-CM

## 2019-04-10 DIAGNOSIS — I15.2 HYPERTENSION ASSOCIATED WITH DIABETES: ICD-10-CM

## 2019-04-10 DIAGNOSIS — E11.65 TYPE 2 DIABETES MELLITUS WITH HYPERGLYCEMIA, WITH LONG-TERM CURRENT USE OF INSULIN: ICD-10-CM

## 2019-04-10 DIAGNOSIS — N18.4 ANEMIA DUE TO STAGE 4 CHRONIC KIDNEY DISEASE: ICD-10-CM

## 2019-04-10 DIAGNOSIS — D63.1 ANEMIA DUE TO STAGE 4 CHRONIC KIDNEY DISEASE: ICD-10-CM

## 2019-04-10 DIAGNOSIS — N18.4 CHRONIC KIDNEY DISEASE (CKD), STAGE IV (SEVERE): ICD-10-CM

## 2019-04-10 LAB
ALBUMIN SERPL BCP-MCNC: 3.5 G/DL (ref 3.5–5.2)
ANION GAP SERPL CALC-SCNC: 12 MMOL/L (ref 8–16)
BASOPHILS # BLD AUTO: 0.01 K/UL (ref 0–0.2)
BASOPHILS NFR BLD: 0.1 % (ref 0–1.9)
BUN SERPL-MCNC: 97 MG/DL (ref 8–23)
CALCIUM SERPL-MCNC: 9 MG/DL (ref 8.7–10.5)
CALCIUM SERPL-MCNC: 9 MG/DL (ref 8.7–10.5)
CHLORIDE SERPL-SCNC: 96 MMOL/L (ref 95–110)
CO2 SERPL-SCNC: 25 MMOL/L (ref 23–29)
CREAT SERPL-MCNC: 4.3 MG/DL (ref 0.5–1.4)
DIFFERENTIAL METHOD: ABNORMAL
EOSINOPHIL # BLD AUTO: 0.2 K/UL (ref 0–0.5)
EOSINOPHIL NFR BLD: 2.4 % (ref 0–8)
ERYTHROCYTE [DISTWIDTH] IN BLOOD BY AUTOMATED COUNT: 13.6 % (ref 11.5–14.5)
EST. GFR  (AFRICAN AMERICAN): 15 ML/MIN/1.73 M^2
EST. GFR  (NON AFRICAN AMERICAN): 13 ML/MIN/1.73 M^2
FERRITIN SERPL-MCNC: 138 NG/ML (ref 20–300)
GLUCOSE SERPL-MCNC: 374 MG/DL (ref 70–110)
HCT VFR BLD AUTO: 34.3 % (ref 40–54)
HGB BLD-MCNC: 11.1 G/DL (ref 14–18)
IRON SERPL-MCNC: 59 UG/DL (ref 45–160)
LYMPHOCYTES # BLD AUTO: 0.7 K/UL (ref 1–4.8)
LYMPHOCYTES NFR BLD: 9.3 % (ref 18–48)
MCH RBC QN AUTO: 28.2 PG (ref 27–31)
MCHC RBC AUTO-ENTMCNC: 32.4 G/DL (ref 32–36)
MCV RBC AUTO: 87 FL (ref 82–98)
MONOCYTES # BLD AUTO: 0.2 K/UL (ref 0.3–1)
MONOCYTES NFR BLD: 2.8 % (ref 4–15)
NEUTROPHILS # BLD AUTO: 6.4 K/UL (ref 1.8–7.7)
NEUTROPHILS NFR BLD: 85.3 % (ref 38–73)
PHOSPHATE SERPL-MCNC: 4.3 MG/DL (ref 2.7–4.5)
PLATELET # BLD AUTO: 279 K/UL (ref 150–350)
PMV BLD AUTO: 8.9 FL (ref 9.2–12.9)
POTASSIUM SERPL-SCNC: 4.4 MMOL/L (ref 3.5–5.1)
PTH-INTACT SERPL-MCNC: 431.6 PG/ML (ref 9–77)
RBC # BLD AUTO: 3.93 M/UL (ref 4.6–6.2)
SATURATED IRON: 20 % (ref 20–50)
SODIUM SERPL-SCNC: 133 MMOL/L (ref 136–145)
TOTAL IRON BINDING CAPACITY: 299 UG/DL (ref 250–450)
TRANSFERRIN SERPL-MCNC: 202 MG/DL (ref 200–375)
WBC # BLD AUTO: 7.51 K/UL (ref 3.9–12.7)

## 2019-04-10 PROCEDURE — 85025 COMPLETE CBC W/AUTO DIFF WBC: CPT

## 2019-04-10 PROCEDURE — 83540 ASSAY OF IRON: CPT

## 2019-04-10 PROCEDURE — 80048 BASIC METABOLIC PNL TOTAL CA: CPT

## 2019-04-10 PROCEDURE — 84100 ASSAY OF PHOSPHORUS: CPT

## 2019-04-10 PROCEDURE — 82728 ASSAY OF FERRITIN: CPT

## 2019-04-10 PROCEDURE — 36415 COLL VENOUS BLD VENIPUNCTURE: CPT

## 2019-04-10 PROCEDURE — 82040 ASSAY OF SERUM ALBUMIN: CPT

## 2019-04-10 PROCEDURE — 83970 ASSAY OF PARATHORMONE: CPT

## 2019-05-07 ENCOUNTER — LAB VISIT (OUTPATIENT)
Dept: LAB | Facility: HOSPITAL | Age: 69
End: 2019-05-07
Attending: INTERNAL MEDICINE
Payer: MEDICARE

## 2019-05-07 DIAGNOSIS — E87.5 HYPERKALEMIA: ICD-10-CM

## 2019-05-07 DIAGNOSIS — D63.1 ANEMIA DUE TO STAGE 4 CHRONIC KIDNEY DISEASE: ICD-10-CM

## 2019-05-07 DIAGNOSIS — Z79.4 TYPE 2 DIABETES MELLITUS WITH STAGE 4 CHRONIC KIDNEY DISEASE, WITH LONG-TERM CURRENT USE OF INSULIN: ICD-10-CM

## 2019-05-07 DIAGNOSIS — N18.4 TYPE 2 DIABETES MELLITUS WITH STAGE 4 CHRONIC KIDNEY DISEASE, WITH LONG-TERM CURRENT USE OF INSULIN: ICD-10-CM

## 2019-05-07 DIAGNOSIS — I10 ESSENTIAL HYPERTENSION: ICD-10-CM

## 2019-05-07 DIAGNOSIS — N25.81 SECONDARY HYPERPARATHYROIDISM OF RENAL ORIGIN: ICD-10-CM

## 2019-05-07 DIAGNOSIS — N18.4 ANEMIA DUE TO STAGE 4 CHRONIC KIDNEY DISEASE: ICD-10-CM

## 2019-05-07 DIAGNOSIS — N18.4 CHRONIC KIDNEY DISEASE (CKD), STAGE IV (SEVERE): ICD-10-CM

## 2019-05-07 DIAGNOSIS — E11.22 TYPE 2 DIABETES MELLITUS WITH STAGE 4 CHRONIC KIDNEY DISEASE, WITH LONG-TERM CURRENT USE OF INSULIN: ICD-10-CM

## 2019-05-07 DIAGNOSIS — Z78.9 ACE INHIBITOR INTOLERANCE: ICD-10-CM

## 2019-05-07 LAB
ALBUMIN SERPL BCP-MCNC: 3.2 G/DL (ref 3.5–5.2)
ANION GAP SERPL CALC-SCNC: 11 MMOL/L (ref 8–16)
BASOPHILS # BLD AUTO: 0.01 K/UL (ref 0–0.2)
BASOPHILS NFR BLD: 0.2 % (ref 0–1.9)
BUN SERPL-MCNC: 70 MG/DL (ref 8–23)
CALCIUM SERPL-MCNC: 8.9 MG/DL (ref 8.7–10.5)
CALCIUM SERPL-MCNC: 8.9 MG/DL (ref 8.7–10.5)
CHLORIDE SERPL-SCNC: 105 MMOL/L (ref 95–110)
CO2 SERPL-SCNC: 24 MMOL/L (ref 23–29)
CREAT SERPL-MCNC: 3.6 MG/DL (ref 0.5–1.4)
DIFFERENTIAL METHOD: ABNORMAL
EOSINOPHIL # BLD AUTO: 0.3 K/UL (ref 0–0.5)
EOSINOPHIL NFR BLD: 3.9 % (ref 0–8)
ERYTHROCYTE [DISTWIDTH] IN BLOOD BY AUTOMATED COUNT: 13.6 % (ref 11.5–14.5)
EST. GFR  (AFRICAN AMERICAN): 19 ML/MIN/1.73 M^2
EST. GFR  (NON AFRICAN AMERICAN): 17 ML/MIN/1.73 M^2
FERRITIN SERPL-MCNC: 109 NG/ML (ref 20–300)
GLUCOSE SERPL-MCNC: 134 MG/DL (ref 70–110)
HCT VFR BLD AUTO: 32.4 % (ref 40–54)
HGB BLD-MCNC: 10.5 G/DL (ref 14–18)
IRON SERPL-MCNC: 54 UG/DL (ref 45–160)
LYMPHOCYTES # BLD AUTO: 0.6 K/UL (ref 1–4.8)
LYMPHOCYTES NFR BLD: 8.8 % (ref 18–48)
MCH RBC QN AUTO: 28.4 PG (ref 27–31)
MCHC RBC AUTO-ENTMCNC: 32.4 G/DL (ref 32–36)
MCV RBC AUTO: 88 FL (ref 82–98)
MONOCYTES # BLD AUTO: 0.5 K/UL (ref 0.3–1)
MONOCYTES NFR BLD: 7.4 % (ref 4–15)
NEUTROPHILS # BLD AUTO: 5.2 K/UL (ref 1.8–7.7)
NEUTROPHILS NFR BLD: 79.5 % (ref 38–73)
PHOSPHATE SERPL-MCNC: 3.8 MG/DL (ref 2.7–4.5)
PLATELET # BLD AUTO: 223 K/UL (ref 150–350)
PMV BLD AUTO: 8.8 FL (ref 9.2–12.9)
POTASSIUM SERPL-SCNC: 3.8 MMOL/L (ref 3.5–5.1)
PTH-INTACT SERPL-MCNC: 406.4 PG/ML (ref 9–77)
RBC # BLD AUTO: 3.7 M/UL (ref 4.6–6.2)
SATURATED IRON: 20 % (ref 20–50)
SODIUM SERPL-SCNC: 140 MMOL/L (ref 136–145)
TOTAL IRON BINDING CAPACITY: 271 UG/DL (ref 250–450)
TRANSFERRIN SERPL-MCNC: 183 MG/DL (ref 200–375)
WBC # BLD AUTO: 6.48 K/UL (ref 3.9–12.7)

## 2019-05-07 PROCEDURE — 82728 ASSAY OF FERRITIN: CPT

## 2019-05-07 PROCEDURE — 83970 ASSAY OF PARATHORMONE: CPT

## 2019-05-07 PROCEDURE — 82040 ASSAY OF SERUM ALBUMIN: CPT

## 2019-05-07 PROCEDURE — 85025 COMPLETE CBC W/AUTO DIFF WBC: CPT

## 2019-05-07 PROCEDURE — 84100 ASSAY OF PHOSPHORUS: CPT

## 2019-05-07 PROCEDURE — 36415 COLL VENOUS BLD VENIPUNCTURE: CPT

## 2019-05-07 PROCEDURE — 80048 BASIC METABOLIC PNL TOTAL CA: CPT

## 2019-05-07 PROCEDURE — 83540 ASSAY OF IRON: CPT

## 2019-05-22 PROBLEM — Z78.9 ACE INHIBITOR INTOLERANCE: Status: ACTIVE | Noted: 2019-05-22

## 2019-08-01 ENCOUNTER — TELEPHONE (OUTPATIENT)
Dept: ELECTROPHYSIOLOGY | Facility: CLINIC | Age: 69
End: 2019-08-01

## 2019-08-01 DIAGNOSIS — I63.9 CRYPTOGENIC STROKE: Primary | ICD-10-CM
